# Patient Record
Sex: FEMALE | Race: WHITE | NOT HISPANIC OR LATINO | URBAN - METROPOLITAN AREA
[De-identification: names, ages, dates, MRNs, and addresses within clinical notes are randomized per-mention and may not be internally consistent; named-entity substitution may affect disease eponyms.]

---

## 2021-02-11 ENCOUNTER — APPOINTMENT (RX ONLY)
Dept: URBAN - METROPOLITAN AREA CLINIC 30 | Facility: CLINIC | Age: 86
Setting detail: DERMATOLOGY
End: 2021-02-11

## 2021-02-11 ENCOUNTER — APPOINTMENT (RX ONLY)
Dept: URBAN - METROPOLITAN AREA CLINIC 29 | Facility: CLINIC | Age: 86
Setting detail: DERMATOLOGY
End: 2021-02-11

## 2021-02-11 DIAGNOSIS — Z85.828 PERSONAL HISTORY OF OTHER MALIGNANT NEOPLASM OF SKIN: ICD-10-CM

## 2021-02-11 DIAGNOSIS — L82.1 OTHER SEBORRHEIC KERATOSIS: ICD-10-CM

## 2021-02-11 PROBLEM — D48.5 NEOPLASM OF UNCERTAIN BEHAVIOR OF SKIN: Status: ACTIVE | Noted: 2021-02-11

## 2021-02-11 PROCEDURE — ? ADDITIONAL NOTES

## 2021-02-11 PROCEDURE — 11102 TANGNTL BX SKIN SINGLE LES: CPT

## 2021-02-11 PROCEDURE — 99202 OFFICE O/P NEW SF 15 MIN: CPT | Mod: 25

## 2021-02-11 PROCEDURE — ? COUNSELING

## 2021-02-11 PROCEDURE — ? BIOPSY BY SHAVE METHOD

## 2021-02-11 ASSESSMENT — LOCATION DETAILED DESCRIPTION DERM
LOCATION DETAILED: RIGHT PROXIMAL DORSAL FOREARM
LOCATION DETAILED: LEFT PROXIMAL DORSAL FOREARM
LOCATION DETAILED: RIGHT PROXIMAL DORSAL FOREARM
LOCATION DETAILED: LEFT PROXIMAL DORSAL FOREARM

## 2021-02-11 ASSESSMENT — LOCATION SIMPLE DESCRIPTION DERM
LOCATION SIMPLE: RIGHT FOREARM
LOCATION SIMPLE: LEFT FOREARM
LOCATION SIMPLE: RIGHT FOREARM
LOCATION SIMPLE: LEFT FOREARM

## 2021-02-11 ASSESSMENT — LOCATION ZONE DERM
LOCATION ZONE: ARM
LOCATION ZONE: ARM

## 2021-02-11 NOTE — PROCEDURE: BIOPSY BY SHAVE METHOD
Hide Accession Number?: No
Hemostasis: Electrocautery
Consent: verbal consent was obtained and risks were reviewed including but not limited to scarring, infection, bleeding, scabbing, incomplete removal, nerve damage and allergy to anesthesia.
Anesthesia Type: 1% lidocaine with epinephrine
Curettage Text: The wound bed was treated with curettage after the biopsy was performed.
Dressing: no dressing applied
Type Of Destruction Used: Curettage
Depth Of Biopsy: dermis
Size Of Lesion In Cm: 0
Electrodesiccation And Curettage Text: The wound bed was treated with electrodesiccation and curettage after the biopsy was performed.
Silver Nitrate Text: The wound bed was treated with silver nitrate after the biopsy was performed.
Post-Care Instructions: I reviewed with the patient in detail post-care instructions. Patient is to keep the biopsy site dry overnight, and then apply vaseline daily until healed. Patient may use occusoft to clean the area if a crust forms
Information: Selecting Yes will display possible errors in your note based on the variables you have selected. This validation is only offered as a suggestion for you. PLEASE NOTE THAT THE VALIDATION TEXT WILL BE REMOVED WHEN YOU FINALIZE YOUR NOTE. IF YOU WANT TO FAX A PRELIMINARY NOTE YOU WILL NEED TO TOGGLE THIS TO 'NO' IF YOU DO NOT WANT IT IN YOUR FAXED NOTE.
Wound Care: Petrolatum
Anesthesia Volume In Cc: 0.3
Cryotherapy Text: The wound bed was treated with cryotherapy after the biopsy was performed.
Billing Type: Third-Party Bill
Notification Instructions: Patient will be notified of biopsy results. However, patient instructed to call the office if not contacted within 2 weeks.
Detail Level: Detailed
Electrodesiccation Text: The wound bed was treated with electrodesiccation after the biopsy was performed.
Was A Bandage Applied: Yes
Biopsy Type: H and E
Biopsy Method: Dermablade

## 2021-02-11 NOTE — PROCEDURE: ADDITIONAL NOTES
Render Risk Assessment In Note?: yes
Additional Notes: We discussed possibility that this could be a reactive or inflammatory process such as from retained suture material, however, I’m concerned about persistent or recurrent SCC. If biopsy shows cancer, then at this point Mohs surgery would be recommended.
Detail Level: Simple

## 2021-02-11 NOTE — PROCEDURE: BIOPSY BY SHAVE METHOD
Detail Level: Detailed
Depth Of Biopsy: dermis
Was A Bandage Applied: Yes
Size Of Lesion In Cm: 0
Biopsy Type: H and E
Biopsy Method: Dermablade
Anesthesia Type: 1% lidocaine with epinephrine
Anesthesia Volume In Cc: 0.3
Hemostasis: Electrocautery
Wound Care: Petrolatum
Dressing: no dressing applied
Destruction After The Procedure: No
Type Of Destruction Used: Curettage
Curettage Text: The wound bed was treated with curettage after the biopsy was performed.
Cryotherapy Text: The wound bed was treated with cryotherapy after the biopsy was performed.
Electrodesiccation Text: The wound bed was treated with electrodesiccation after the biopsy was performed.
Electrodesiccation And Curettage Text: The wound bed was treated with electrodesiccation and curettage after the biopsy was performed.
Silver Nitrate Text: The wound bed was treated with silver nitrate after the biopsy was performed.
Lab: [id number]
Lab Facility: [id number]
Consent: verbal consent was obtained and risks were reviewed including but not limited to scarring, infection, bleeding, scabbing, incomplete removal, nerve damage and allergy to anesthesia.
Post-Care Instructions: I reviewed with the patient in detail post-care instructions. Patient is to keep the biopsy site dry overnight, and then apply vaseline daily until healed. Patient may use occusoft to clean the area if a crust forms
Notification Instructions: Patient will be notified of biopsy results. However, patient instructed to call the office if not contacted within 2 weeks.
Billing Type: Third-Party Bill
Information: Selecting Yes will display possible errors in your note based on the variables you have selected. This validation is only offered as a suggestion for you. PLEASE NOTE THAT THE VALIDATION TEXT WILL BE REMOVED WHEN YOU FINALIZE YOUR NOTE. IF YOU WANT TO FAX A PRELIMINARY NOTE YOU WILL NEED TO TOGGLE THIS TO 'NO' IF YOU DO NOT WANT IT IN YOUR FAXED NOTE.

## 2021-02-11 NOTE — PROCEDURE: ADDITIONAL NOTES
Additional Notes: We discussed possibility that this could be a reactive or inflammatory process such as from retained suture material, however, I’m concerned about persistent or recurrent SCC. If biopsy shows cancer, then at this point Mohs surgery would be recommended.
Render Risk Assessment In Note?: yes
Detail Level: Simple

## 2021-02-24 ENCOUNTER — APPOINTMENT (RX ONLY)
Dept: URBAN - METROPOLITAN AREA CLINIC 30 | Facility: CLINIC | Age: 86
Setting detail: DERMATOLOGY
End: 2021-02-24

## 2021-02-24 ENCOUNTER — APPOINTMENT (RX ONLY)
Dept: URBAN - METROPOLITAN AREA CLINIC 29 | Facility: CLINIC | Age: 86
Setting detail: DERMATOLOGY
End: 2021-02-24

## 2021-02-24 PROBLEM — C44.722 SQUAMOUS CELL CARCINOMA OF SKIN OF RIGHT LOWER LIMB, INCLUDING HIP: Status: ACTIVE | Noted: 2021-02-24

## 2021-02-24 PROCEDURE — G6001 ECHO GUIDANCE RADIOTHERAPY: HCPCS

## 2021-02-24 PROCEDURE — ? PHE RELATED SUPPLIES PROVIDED/DISPENSED

## 2021-02-24 PROCEDURE — 77334 RADIATION TREATMENT AID(S): CPT

## 2021-02-24 PROCEDURE — 77280 THER RAD SIMULAJ FIELD SMPL: CPT

## 2021-02-24 PROCEDURE — 99213 OFFICE O/P EST LOW 20 MIN: CPT | Mod: 25

## 2021-02-24 PROCEDURE — 77300 RADIATION THERAPY DOSE PLAN: CPT

## 2021-02-24 PROCEDURE — 77261 THER RADIOLOGY TX PLNG SMPL: CPT

## 2021-02-24 PROCEDURE — ? SUPERFICIAL RADIATION TREATMENT

## 2021-02-24 PROCEDURE — ? TREATMENT REGIMEN

## 2021-02-24 NOTE — PROCEDURE: SUPERFICIAL RADIATION TREATMENT
Bill For Treatment Devices Only: No
Additional Prescription Justification Text: If there is any interruption in treatment exceeding 5 days please see Decay and Dose Adjustment Calculation and complete treatment under Prescription 2, 3 or 4 as appropriate.
Render Prescriptions In Note?: Yes
Port Dimensions-Y Axis In Cm: 3.5
Depth (Optional-Please Include Units): 2.07 mm
Energy (Optional-Please Include Units): 100 KV
Information: Selecting Yes will display possible errors in your note based on the variables you have selected. This validation is only offered as a suggestion for you. PLEASE NOTE THAT THE VALIDATION TEXT WILL BE REMOVED WHEN YOU FINALIZE YOUR NOTE. IF YOU WANT TO FAX A PRELIMINARY NOTE YOU WILL NEED TO TOGGLE THIS TO 'NO' IF YOU DO NOT WANT IT IN YOUR FAXED NOTE.
Daily Fractionated Dose (Optional- Include Units): 237.6 cGy
Functional Status: 0 (fully active)
Custom Shielding Preamble Text Will Not Be Included With Simple Simulations (.......... X X Y Cm): A lead shield of 0.762 mm thickness is utilized to form a molded, custom shield with a
Simple Simulation Preamble Text Will Be Included With Simple Simulations (.......... Indications): Simple simulation was performed today for the following reasons:
Total Number Of Fractions Rx 4: 15
Field Size (Applicator): 4.0 cm
Fractionation Number (Evaluation): 5
Treatment Device Design After Initial Simulation Justification (Will Render If Bill For Treatment Devices = Yes): The patient is status post radiation simulation and is evaluated as to the use of additional devices for shielding and placement for radiation therapy.
Field Number: 1
Dose Per Fractionation In Cgy (Optional): 237.6
Custom Shielding Afterword Text Will Not Be Included With Simple Simulations (X X Y Cm............): port to correlate with the lesion size, including treatment margin. The custom lead shield is adequate to accommodate the appropriate applicator and provide adequate shielding around the treatment site. Additional shielding (as noted below) is used to protect sensitive, normal tissues.
Treatment Time / Fractionation (Optional- Include Units): 0.36 min
Total Dose (Optional-Please Include Units): 1760
Detail Level: Zone
Fractionation Number: 0
Dimensions-X Axis In Cm: 1.9
Assessment: Appropriate reaction
Total Number Of Fractions: 25
Computed Treatment Time In Min (Will Render The Same As Calculated Treatment Time If Left Blank): 0.36
Treatment Margins In Cm: 0.5
Time Dose Fractionation (Optional- Include Units If Applicable): 98
Patient Positioning: Sitting
Initial Radiation Treatment Planning (Will Render If Bill Simulation = Yes): The patient had a complete consultation regarding all applicable modalities for the treatment of their skin cancer and based on a variety of factors including the type of tumor, size, and location, the relevant medical history as well as local tissue factors, the functional status of the individual, the ability to perform necessary postoperative wound instructions and the need for simultaneous treatments as well as overall wound healing status, it was determined that the patient would begin radiation therapy treatment for skin cancer.  A full simulation and treatment device design was performed including the determination and formulation of appropriate simple and complex devices including lead shield of 0.762 mm thickness to form molded customized shielding to specifically correlate with the lesion size including treatment margin.  The custom lead shield is adequate to accommodate the appropriate applicator and provide adequate shielding around the treatment site.  The specific field applicator, shields, and devices both simple and complex as well as the specific patient setup is outlined below.  The patient was given a full consent for superficial radiation to both verbally and in writing and the full determination of patient's eligibility for treatment and selection is outlined on the patient eligibility and treatment selection form.  The specific superficial radiotherapy prescription was determined and was documented on the superficial radiotherapy prescription form.  A treatment calculation was also performed and documented on the treatment calculation form.  Based on the prescription, the patient was scheduled for a series of fractional treatments.
Dimensions-Y Axis In Cm: 1.4
Shielding Size (Optional- Include Units): 3.5 cm x 3.5 cm
Fractions / Week: 3
Limb Positioning Or Elevation: elevated

## 2021-02-24 NOTE — PROCEDURE: SUPERFICIAL RADIATION TREATMENT
Detail Level: Zone
Field Number: 1
Dimensions-X Axis In Cm: 1.9
Dimensions-Y Axis In Cm: 1.4
Treatment Margins In Cm: 0.5
Render Additional Prescriptions In Note?: Yes
Joshua For Simulation Without Treatment Device Design (Simple Simulation): No
Functional Status: 0 (fully active)
Port Dimensions-X Axis In Cm: 3.5
Field Size (Applicator): 4.0 cm
Patient Positioning: Sitting
Limb Positioning Or Elevation: elevated
Custom Shielding Preamble Text Will Not Be Included With Simple Simulations (.......... X X Y Cm): A lead shield of 0.762 mm thickness is utilized to form a molded, custom shield with a
Custom Shielding Afterword Text Will Not Be Included With Simple Simulations (X X Y Cm............): port to correlate with the lesion size, including treatment margin. The custom lead shield is adequate to accommodate the appropriate applicator and provide adequate shielding around the treatment site. Additional shielding (as noted below) is used to protect sensitive, normal tissues.
Initial Radiation Treatment Planning (Will Render If Bill Simulation = Yes): The patient had a complete consultation regarding all applicable modalities for the treatment of their skin cancer and based on a variety of factors including the type of tumor, size, and location, the relevant medical history as well as local tissue factors, the functional status of the individual, the ability to perform necessary postoperative wound instructions and the need for simultaneous treatments as well as overall wound healing status, it was determined that the patient would begin radiation therapy treatment for skin cancer.  A full simulation and treatment device design was performed including the determination and formulation of appropriate simple and complex devices including lead shield of 0.762 mm thickness to form molded customized shielding to specifically correlate with the lesion size including treatment margin.  The custom lead shield is adequate to accommodate the appropriate applicator and provide adequate shielding around the treatment site.  The specific field applicator, shields, and devices both simple and complex as well as the specific patient setup is outlined below.  The patient was given a full consent for superficial radiation to both verbally and in writing and the full determination of patient's eligibility for treatment and selection is outlined on the patient eligibility and treatment selection form.  The specific superficial radiotherapy prescription was determined and was documented on the superficial radiotherapy prescription form.  A treatment calculation was also performed and documented on the treatment calculation form.  Based on the prescription, the patient was scheduled for a series of fractional treatments.
Simple Simulation Preamble Text Will Be Included With Simple Simulations (.......... Indications): Simple simulation was performed today for the following reasons:
Treatment Device Design After Initial Simulation Justification (Will Render If Bill For Treatment Devices = Yes): The patient is status post radiation simulation and is evaluated as to the use of additional devices for shielding and placement for radiation therapy.
Additional Prescription Justification Text: If there is any interruption in treatment exceeding 5 days please see Decay and Dose Adjustment Calculation and complete treatment under Prescription 2, 3 or 4 as appropriate.
Shielding Size (Optional- Include Units): 3.5 cm x 3.5 cm
Energy (Optional-Please Include Units): 100 KV
Depth (Optional-Please Include Units): 2.07 mm
Fractions / Week: 3
Time Dose Fractionation (Optional- Include Units If Applicable): 98
Daily Fractionated Dose (Optional- Include Units): 237.6 cGy
Treatment Time / Fractionation (Optional- Include Units): 0.36 min
Total Number Of Fractions: 25
Total Dose (Optional-Please Include Units): 6180
Fractions / Week Rx 2: 5
Total Number Of Fractions Rx 2: 15
Dose Per Fractionation In Cgy (Optional): 237.6
Computed Treatment Time In Min (Will Render The Same As Calculated Treatment Time If Left Blank): 0.36
Fractionation Number: 0
Assessment: Appropriate reaction
Information: Selecting Yes will display possible errors in your note based on the variables you have selected. This validation is only offered as a suggestion for you. PLEASE NOTE THAT THE VALIDATION TEXT WILL BE REMOVED WHEN YOU FINALIZE YOUR NOTE. IF YOU WANT TO FAX A PRELIMINARY NOTE YOU WILL NEED TO TOGGLE THIS TO 'NO' IF YOU DO NOT WANT IT IN YOUR FAXED NOTE.

## 2021-02-24 NOTE — PROCEDURE: TREATMENT REGIMEN
Plan: Per the request of Dr. Gao, patient was seen today for Superficial Radiation Therapy requiring simulation in preparation for treatment of specific diseased site(s). Simulation is necessary to determine correct patient and treatment portal positioning, deliver safe and effective radiation therapy. \\n\\nA high frequency ultrasound image was acquired today for three-dimensional evaluation of tumor volume and response to treatment, in addition to geometric accuracy of field placement (CPT® ). Physician evaluation of the ultrasound tumor depth will be ongoing through the course of treatment and is deemed medically necessary ensuring efficacy of treatment. Ultrasound was used to confirm treatment location and determine depth of treatment. \\n\\Lakesha appropriate custom blocking and treatment parameters were verified by the Radiation Therapist. Today’s visit is for Simulation and planning for radiation therapy.  Questions were answered and concerns were addressed.  Patient was evaluated based on listed criteria and is a suitable candidate to begin SRT.   \\n\\nUS Depth: 2.07 mm\\n
Detail Level: Zone

## 2021-02-24 NOTE — PROCEDURE: MIPS QUALITY
Additional Notes: Patient has not received flu vaccine due to personal reasons
Detail Level: Detailed
Quality 110: Preventive Care And Screening: Influenza Immunization: Influenza Immunization not Administered for Documented Reasons.

## 2021-02-24 NOTE — PROCEDURE: TREATMENT REGIMEN
Detail Level: Zone
Plan: Per the request of Dr. Mitchell, patient was seen today for Superficial Radiation Therapy requiring simulation in preparation for treatment of specific diseased site(s). Simulation is necessary to determine correct patient and treatment portal positioning, deliver safe and effective radiation therapy. \\n\\nA high frequency ultrasound image was acquired today for three-dimensional evaluation of tumor volume and response to treatment, in addition to geometric accuracy of field placement (CPT® ). Physician evaluation of the ultrasound tumor depth will be ongoing through the course of treatment and is deemed medically necessary ensuring efficacy of treatment. Ultrasound was used to confirm treatment location and determine depth of treatment. \\n\\Gurjit appropriate custom blocking and treatment parameters were verified by the Radiation Therapist. Today’s visit is for Simulation and planning for radiation therapy.  Questions were answered and concerns were addressed.  Patient was evaluated based on listed criteria and is a suitable candidate to begin SRT.   \\n\\nUS Depth: 2.07 mm\\n

## 2021-03-02 ENCOUNTER — APPOINTMENT (RX ONLY)
Dept: URBAN - METROPOLITAN AREA CLINIC 29 | Facility: CLINIC | Age: 86
Setting detail: DERMATOLOGY
End: 2021-03-02

## 2021-03-02 ENCOUNTER — APPOINTMENT (RX ONLY)
Dept: URBAN - METROPOLITAN AREA CLINIC 30 | Facility: CLINIC | Age: 86
Setting detail: DERMATOLOGY
End: 2021-03-02

## 2021-03-02 PROBLEM — C44.722 SQUAMOUS CELL CARCINOMA OF SKIN OF RIGHT LOWER LIMB, INCLUDING HIP: Status: ACTIVE | Noted: 2021-03-02

## 2021-03-02 PROCEDURE — ? SUPERFICIAL RADIATION TREATMENT

## 2021-03-02 PROCEDURE — ? TREATMENT REGIMEN

## 2021-03-02 PROCEDURE — 77401: CPT

## 2021-03-02 PROCEDURE — G6001 ECHO GUIDANCE RADIOTHERAPY: HCPCS

## 2021-03-02 PROCEDURE — ? PHE RELATED SUPPLIES PROVIDED/DISPENSED

## 2021-03-02 PROCEDURE — 77280 THER RAD SIMULAJ FIELD SMPL: CPT

## 2021-03-02 NOTE — PROCEDURE: SUPERFICIAL RADIATION TREATMENT
Fractions / Week Rx 2: 5
Time Dose Fractionation (Optional- Include Units If Applicable): 98
Simple Simulation Preamble Text Will Be Included With Simple Simulations (.......... Indications): Simple simulation was performed today for the following reasons:
Cumulative Dose In Cgy (Optional): 237.6
Treatment Margins In Cm: 0.5
Include Rx 2 When Rendering Additional Prescriptions: No
Fractionation Number: 1
Field Size (Applicator): 4.0 cm
Render Patient Eligibility And Selection In Note?: Yes
Fractions / Week: 3
Initial Radiation Treatment Planning (Will Render If Bill Simulation = Yes): The patient had a complete consultation regarding all applicable modalities for the treatment of their skin cancer and based on a variety of factors including the type of tumor, size, and location, the relevant medical history as well as local tissue factors, the functional status of the individual, the ability to perform necessary postoperative wound instructions and the need for simultaneous treatments as well as overall wound healing status, it was determined that the patient would begin radiation therapy treatment for skin cancer.  A full simulation and treatment device design was performed including the determination and formulation of appropriate simple and complex devices including lead shield of 0.762 mm thickness to form molded customized shielding to specifically correlate with the lesion size including treatment margin.  The custom lead shield is adequate to accommodate the appropriate applicator and provide adequate shielding around the treatment site.  The specific field applicator, shields, and devices both simple and complex as well as the specific patient setup is outlined below.  The patient was given a full consent for superficial radiation to both verbally and in writing and the full determination of patient's eligibility for treatment and selection is outlined on the patient eligibility and treatment selection form.  The specific superficial radiotherapy prescription was determined and was documented on the superficial radiotherapy prescription form.  A treatment calculation was also performed and documented on the treatment calculation form.  Based on the prescription, the patient was scheduled for a series of fractional treatments.
Total Dose (Optional-Please Include Units): 9966
Dimensions-Y Axis In Cm: 1.4
Shielding Size (Optional- Include Units): 3.5 cm x 3.5 cm
Total Number Of Fractions Rx 4: 15
Daily Fractionated Dose (Optional- Include Units): 237.6 cGy
Assessment: Appropriate reaction
Treatment Time In Min (Optional): 0.36
Patient Positioning: Sitting
Detail Level: Zone
Energy (Optional-Please Include Units): 100 KV
Port Dimensions-X Axis In Cm: 3.5
Custom Shielding Preamble Text Will Not Be Included With Simple Simulations (.......... X X Y Cm): A lead shield of 0.762 mm thickness is utilized to form a molded, custom shield with a
Functional Status: 0 (fully active)
Treatment Time / Fractionation (Optional- Include Units): 0.36 min
Treatment Device Design After Initial Simulation Justification (Will Render If Bill For Treatment Devices = Yes): The patient is status post radiation simulation and is evaluated as to the use of additional devices for shielding and placement for radiation therapy.
Custom Shielding Afterword Text Will Not Be Included With Simple Simulations (X X Y Cm............): port to correlate with the lesion size, including treatment margin. The custom lead shield is adequate to accommodate the appropriate applicator and provide adequate shielding around the treatment site. Additional shielding (as noted below) is used to protect sensitive, normal tissues.
Depth (Optional-Please Include Units): 2.07 mm
Total Number Of Fractions: 25
Information: Selecting Yes will display possible errors in your note based on the variables you have selected. This validation is only offered as a suggestion for you. PLEASE NOTE THAT THE VALIDATION TEXT WILL BE REMOVED WHEN YOU FINALIZE YOUR NOTE. IF YOU WANT TO FAX A PRELIMINARY NOTE YOU WILL NEED TO TOGGLE THIS TO 'NO' IF YOU DO NOT WANT IT IN YOUR FAXED NOTE.
Additional Prescription Justification Text: If there is any interruption in treatment exceeding 5 days please see Decay and Dose Adjustment Calculation and complete treatment under Prescription 2, 3 or 4 as appropriate.
Dimensions-X Axis In Cm: 1.9
Limb Positioning Or Elevation: elevated
Day Of The Week Treatment Administered: Tuesday

## 2021-03-02 NOTE — PROCEDURE: TREATMENT REGIMEN
Plan: This patient has been treated today with image guided superficial radiation therapy for non-melanoma\\nskin cancer. Written informed consent has been previously obtained from this patient for this treatment. This\\nconsent is documented in the patient’s chart. The patient gave verbal consent to continue treatment\\ntoday. The patient was treated with a specific radiation dose and setup as prescribed by the provider listed on\\nthis visit note. A Radiation Therapist performed administration of radiation under supervision of\\nprovider. The treatment parameters and cumulative dose are indicated above. Prior to administering the radiation, the patient underwent a verification therapeutic radiology simulation-aided field setting defining relevant normal and abnormal target anatomy and acquiring images with high frequency ultrasound in addition to data necessary developing optimal radiation treatment process for the patient. This process includes verification of the treatment port(s) and proper treatment positioning. All treatment ports were photographed within electronic medical record. The\\npatient’s customized lead blocking along with gross tumor volume and margin was confirmed. Considering superficial radiotherapy is clinical in setup, this requires physician and radiation therapist to clarify location interest being treated against initial images, pathology and patient anatomy. Care was taken ensuring fields treated were geometrically accurate and properly positioned using therapeutic radiology simulation-aided field setting verification per fraction. This\\nprocess is also utilized to determine if any prescription or setup changes are necessary. These steps are\\ntherefore medically necessary ensuring safe and effective administration of radiation. Ongoing therapeutic radiology simulation-aided field setting verification is ordered throughout course of therapy. A high frequency ultrasound image was acquired today for two-dimensional evaluation of the tumor volume and response to treatment, in addition to geometric accuracy of field placement. US depth Is 1.71 mm, which is 0.36 mm in difference from previous imaging. The field placement and ultrasound imaging, per fraction, is separate and distinct from the initial simulation, and is an important task in providing safe administration of superficial radiation therapy. Physician evaluation of\\nthe ultrasound tumor depth will be ongoing throughout the course of treatment and is deemed medically necessary in order to ensure the efficacy of treatment and any necessary changes. Today’s image was evaluated for determination of continuation of treatment with the current plan or with necessary changes as appropriate. According to provider review of verification therapeutic radiology simulation-aided field setting and imaging, no change is required. Additionally, the use of ultrasound visualization and targeted assessment allows the patient to be able to see their cancer(s) progress, encouraging patient to complete and maintain compliance through full course of radiotherapy. Per Dr. Gao, continued ultrasound guidance and therapeutic radiology simulation-aided field setting verification per fraction is required for field placement, measurement of tumor depth, progress and acute effect monitoring.\\n\\n
Detail Level: Zone

## 2021-03-02 NOTE — PROCEDURE: SUPERFICIAL RADIATION TREATMENT
Detail Level: Zone
Field Number: 1
Dimensions-X Axis In Cm: 1.9
Dimensions-Y Axis In Cm: 1.4
Treatment Margins In Cm: 0.5
Render Additional Prescriptions In Note?: No
Render Patient Eligibility And Selection In Note?: Yes
Functional Status: 0 (fully active)
Port Dimensions-X Axis In Cm: 3.5
Field Size (Applicator): 4.0 cm
Patient Positioning: Sitting
Limb Positioning Or Elevation: elevated
Custom Shielding Preamble Text Will Not Be Included With Simple Simulations (.......... X X Y Cm): A lead shield of 0.762 mm thickness is utilized to form a molded, custom shield with a
Custom Shielding Afterword Text Will Not Be Included With Simple Simulations (X X Y Cm............): port to correlate with the lesion size, including treatment margin. The custom lead shield is adequate to accommodate the appropriate applicator and provide adequate shielding around the treatment site. Additional shielding (as noted below) is used to protect sensitive, normal tissues.
Initial Radiation Treatment Planning (Will Render If Bill Simulation = Yes): The patient had a complete consultation regarding all applicable modalities for the treatment of their skin cancer and based on a variety of factors including the type of tumor, size, and location, the relevant medical history as well as local tissue factors, the functional status of the individual, the ability to perform necessary postoperative wound instructions and the need for simultaneous treatments as well as overall wound healing status, it was determined that the patient would begin radiation therapy treatment for skin cancer.  A full simulation and treatment device design was performed including the determination and formulation of appropriate simple and complex devices including lead shield of 0.762 mm thickness to form molded customized shielding to specifically correlate with the lesion size including treatment margin.  The custom lead shield is adequate to accommodate the appropriate applicator and provide adequate shielding around the treatment site.  The specific field applicator, shields, and devices both simple and complex as well as the specific patient setup is outlined below.  The patient was given a full consent for superficial radiation to both verbally and in writing and the full determination of patient's eligibility for treatment and selection is outlined on the patient eligibility and treatment selection form.  The specific superficial radiotherapy prescription was determined and was documented on the superficial radiotherapy prescription form.  A treatment calculation was also performed and documented on the treatment calculation form.  Based on the prescription, the patient was scheduled for a series of fractional treatments.
Simple Simulation Preamble Text Will Be Included With Simple Simulations (.......... Indications): Simple simulation was performed today for the following reasons:
Treatment Device Design After Initial Simulation Justification (Will Render If Bill For Treatment Devices = Yes): The patient is status post radiation simulation and is evaluated as to the use of additional devices for shielding and placement for radiation therapy.
Additional Prescription Justification Text: If there is any interruption in treatment exceeding 5 days please see Decay and Dose Adjustment Calculation and complete treatment under Prescription 2, 3 or 4 as appropriate.
Shielding Size (Optional- Include Units): 3.5 cm x 3.5 cm
Energy (Optional-Please Include Units): 100 KV
Depth (Optional-Please Include Units): 2.07 mm
Fractions / Week: 3
Time Dose Fractionation (Optional- Include Units If Applicable): 98
Daily Fractionated Dose (Optional- Include Units): 237.6 cGy
Treatment Time / Fractionation (Optional- Include Units): 0.36 min
Total Number Of Fractions: 25
Total Dose (Optional-Please Include Units): 6881
Fractions / Week Rx 2: 5
Total Number Of Fractions Rx 2: 15
Dose Per Fractionation In Cgy (Optional): 237.6
Computed Treatment Time In Min (Will Render The Same As Calculated Treatment Time If Left Blank): 0.36
Day Of The Week Treatment Administered: Tuesday
Assessment: Appropriate reaction
Information: Selecting Yes will display possible errors in your note based on the variables you have selected. This validation is only offered as a suggestion for you. PLEASE NOTE THAT THE VALIDATION TEXT WILL BE REMOVED WHEN YOU FINALIZE YOUR NOTE. IF YOU WANT TO FAX A PRELIMINARY NOTE YOU WILL NEED TO TOGGLE THIS TO 'NO' IF YOU DO NOT WANT IT IN YOUR FAXED NOTE.

## 2021-03-03 ENCOUNTER — APPOINTMENT (RX ONLY)
Dept: URBAN - METROPOLITAN AREA CLINIC 29 | Facility: CLINIC | Age: 86
Setting detail: DERMATOLOGY
End: 2021-03-03

## 2021-03-03 ENCOUNTER — APPOINTMENT (RX ONLY)
Dept: URBAN - METROPOLITAN AREA CLINIC 30 | Facility: CLINIC | Age: 86
Setting detail: DERMATOLOGY
End: 2021-03-03

## 2021-03-03 PROBLEM — C44.722 SQUAMOUS CELL CARCINOMA OF SKIN OF RIGHT LOWER LIMB, INCLUDING HIP: Status: ACTIVE | Noted: 2021-03-03

## 2021-03-03 PROCEDURE — ? TREATMENT REGIMEN

## 2021-03-03 PROCEDURE — 77280 THER RAD SIMULAJ FIELD SMPL: CPT

## 2021-03-03 PROCEDURE — ? PHE RELATED SUPPLIES PROVIDED/DISPENSED

## 2021-03-03 PROCEDURE — ? SUPERFICIAL RADIATION TREATMENT

## 2021-03-03 PROCEDURE — 77401: CPT

## 2021-03-03 PROCEDURE — G6001 ECHO GUIDANCE RADIOTHERAPY: HCPCS

## 2021-03-03 NOTE — PROCEDURE: SUPERFICIAL RADIATION TREATMENT
Detail Level: Zone
Field Number: 1
Dimensions-X Axis In Cm: 1.9
Dimensions-Y Axis In Cm: 1.4
Treatment Margins In Cm: 0.5
Render Additional Prescriptions In Note?: No
Render Patient Eligibility And Selection In Note?: Yes
Functional Status: 0 (fully active)
Port Dimensions-X Axis In Cm: 3.5
Field Size (Applicator): 4.0 cm
Patient Positioning: Sitting
Limb Positioning Or Elevation: elevated
Custom Shielding Preamble Text Will Not Be Included With Simple Simulations (.......... X X Y Cm): A lead shield of 0.762 mm thickness is utilized to form a molded, custom shield with a
Custom Shielding Afterword Text Will Not Be Included With Simple Simulations (X X Y Cm............): port to correlate with the lesion size, including treatment margin. The custom lead shield is adequate to accommodate the appropriate applicator and provide adequate shielding around the treatment site. Additional shielding (as noted below) is used to protect sensitive, normal tissues.
Initial Radiation Treatment Planning (Will Render If Bill Simulation = Yes): The patient had a complete consultation regarding all applicable modalities for the treatment of their skin cancer and based on a variety of factors including the type of tumor, size, and location, the relevant medical history as well as local tissue factors, the functional status of the individual, the ability to perform necessary postoperative wound instructions and the need for simultaneous treatments as well as overall wound healing status, it was determined that the patient would begin radiation therapy treatment for skin cancer.  A full simulation and treatment device design was performed including the determination and formulation of appropriate simple and complex devices including lead shield of 0.762 mm thickness to form molded customized shielding to specifically correlate with the lesion size including treatment margin.  The custom lead shield is adequate to accommodate the appropriate applicator and provide adequate shielding around the treatment site.  The specific field applicator, shields, and devices both simple and complex as well as the specific patient setup is outlined below.  The patient was given a full consent for superficial radiation to both verbally and in writing and the full determination of patient's eligibility for treatment and selection is outlined on the patient eligibility and treatment selection form.  The specific superficial radiotherapy prescription was determined and was documented on the superficial radiotherapy prescription form.  A treatment calculation was also performed and documented on the treatment calculation form.  Based on the prescription, the patient was scheduled for a series of fractional treatments.
Simple Simulation Preamble Text Will Be Included With Simple Simulations (.......... Indications): Simple simulation was performed today for the following reasons:
Treatment Device Design After Initial Simulation Justification (Will Render If Bill For Treatment Devices = Yes): The patient is status post radiation simulation and is evaluated as to the use of additional devices for shielding and placement for radiation therapy.
Additional Prescription Justification Text: If there is any interruption in treatment exceeding 5 days please see Decay and Dose Adjustment Calculation and complete treatment under Prescription 2, 3 or 4 as appropriate.
Shielding Size (Optional- Include Units): 3.5 cm x 3.5 cm
Energy (Optional-Please Include Units): 100 KV
Depth (Optional-Please Include Units): 2.07 mm
Fractions / Week: 3
Time Dose Fractionation (Optional- Include Units If Applicable): 98
Daily Fractionated Dose (Optional- Include Units): 237.6 cGy
Treatment Time / Fractionation (Optional- Include Units): 0.36 min
Total Number Of Fractions: 25
Total Dose (Optional-Please Include Units): 6850
Fractions / Week Rx 2: 5
Total Number Of Fractions Rx 2: 15
Dose Per Fractionation In Cgy (Optional): 237.6
Computed Treatment Time In Min (Will Render The Same As Calculated Treatment Time If Left Blank): 0.36
Day Of The Week Treatment Administered: Wednesday
Fractionation Number: 2
Cumulative Dose In Cgy (Optional): 475.2
Assessment: Appropriate reaction
Information: Selecting Yes will display possible errors in your note based on the variables you have selected. This validation is only offered as a suggestion for you. PLEASE NOTE THAT THE VALIDATION TEXT WILL BE REMOVED WHEN YOU FINALIZE YOUR NOTE. IF YOU WANT TO FAX A PRELIMINARY NOTE YOU WILL NEED TO TOGGLE THIS TO 'NO' IF YOU DO NOT WANT IT IN YOUR FAXED NOTE.

## 2021-03-03 NOTE — PROCEDURE: SUPERFICIAL RADIATION TREATMENT
Day Of The Week Treatment Administered: Wednesday
Dose Per Fractionation In Cgy (Optional): 237.6
Information: Selecting Yes will display possible errors in your note based on the variables you have selected. This validation is only offered as a suggestion for you. PLEASE NOTE THAT THE VALIDATION TEXT WILL BE REMOVED WHEN YOU FINALIZE YOUR NOTE. IF YOU WANT TO FAX A PRELIMINARY NOTE YOU WILL NEED TO TOGGLE THIS TO 'NO' IF YOU DO NOT WANT IT IN YOUR FAXED NOTE.
Fractionation Number (Evaluation): 5
Field Size (Applicator): 4.0 cm
Validate Note Data (See Information Below): Yes
Time Dose Fractionation (Optional- Include Units If Applicable): 98
Bill For Simulation And Treatment Device Design: No
Treatment Time / Fractionation (Optional- Include Units): 0.36 min
Treatment Device Design After Initial Simulation Justification (Will Render If Bill For Treatment Devices = Yes): The patient is status post radiation simulation and is evaluated as to the use of additional devices for shielding and placement for radiation therapy.
Total Number Of Fractions Rx 4: 15
Initial Radiation Treatment Planning (Will Render If Bill Simulation = Yes): The patient had a complete consultation regarding all applicable modalities for the treatment of their skin cancer and based on a variety of factors including the type of tumor, size, and location, the relevant medical history as well as local tissue factors, the functional status of the individual, the ability to perform necessary postoperative wound instructions and the need for simultaneous treatments as well as overall wound healing status, it was determined that the patient would begin radiation therapy treatment for skin cancer.  A full simulation and treatment device design was performed including the determination and formulation of appropriate simple and complex devices including lead shield of 0.762 mm thickness to form molded customized shielding to specifically correlate with the lesion size including treatment margin.  The custom lead shield is adequate to accommodate the appropriate applicator and provide adequate shielding around the treatment site.  The specific field applicator, shields, and devices both simple and complex as well as the specific patient setup is outlined below.  The patient was given a full consent for superficial radiation to both verbally and in writing and the full determination of patient's eligibility for treatment and selection is outlined on the patient eligibility and treatment selection form.  The specific superficial radiotherapy prescription was determined and was documented on the superficial radiotherapy prescription form.  A treatment calculation was also performed and documented on the treatment calculation form.  Based on the prescription, the patient was scheduled for a series of fractional treatments.
Field Number: 1
Patient Positioning: Sitting
Limb Positioning Or Elevation: elevated
Total Number Of Fractions: 25
Depth (Optional-Please Include Units): 2.07 mm
Port Dimensions-X Axis In Cm: 3.5
Simple Simulation Preamble Text Will Be Included With Simple Simulations (.......... Indications): Simple simulation was performed today for the following reasons:
Dimensions-X Axis In Cm: 1.9
Additional Prescription Justification Text: If there is any interruption in treatment exceeding 5 days please see Decay and Dose Adjustment Calculation and complete treatment under Prescription 2, 3 or 4 as appropriate.
Custom Shielding Preamble Text Will Not Be Included With Simple Simulations (.......... X X Y Cm): A lead shield of 0.762 mm thickness is utilized to form a molded, custom shield with a
Daily Fractionated Dose (Optional- Include Units): 237.6 cGy
Energy (Include Units): 100 KV
Cumulative Dose In Cgy (Optional): 475.2
Custom Shielding Afterword Text Will Not Be Included With Simple Simulations (X X Y Cm............): port to correlate with the lesion size, including treatment margin. The custom lead shield is adequate to accommodate the appropriate applicator and provide adequate shielding around the treatment site. Additional shielding (as noted below) is used to protect sensitive, normal tissues.
Shielding Size (Optional- Include Units): 3.5 cm x 3.5 cm
Dimensions-Y Axis In Cm: 1.4
Treatment Time In Min (Optional): 0.36
Fractionation Number: 2
Functional Status: 0 (fully active)
Total Dose (Optional-Please Include Units): 8502
Detail Level: Zone
Assessment: Appropriate reaction
Fractions / Week: 3
Treatment Margins In Cm: 0.5

## 2021-03-03 NOTE — PROCEDURE: TREATMENT REGIMEN
Plan: This patient has been treated today with image guided superficial radiation therapy for non-melanoma\\nskin cancer. Written informed consent has been previously obtained from this patient for this treatment. This\\nconsent is documented in the patient’s chart. The patient gave verbal consent to continue treatment\\ntoday. The patient was treated with a specific radiation dose and setup as prescribed by the provider listed on\\nthis visit note. A Radiation Therapist performed administration of radiation under supervision of\\nprovider. The treatment parameters and cumulative dose are indicated above. Prior to administering the radiation, the patient underwent a verification therapeutic radiology simulation-aided field setting defining relevant normal and abnormal target anatomy and acquiring images with high frequency ultrasound in addition to data necessary developing optimal radiation treatment process for the patient. This process includes verification of the treatment port(s) and proper treatment positioning. All treatment ports were photographed within electronic medical record. The\\npatient’s customized lead blocking along with gross tumor volume and margin was confirmed. Considering superficial radiotherapy is clinical in setup, this requires physician and radiation therapist to clarify location interest being treated against initial images, pathology and patient anatomy. Care was taken ensuring fields treated were geometrically accurate and properly positioned using therapeutic radiology simulation-aided field setting verification per fraction. This\\nprocess is also utilized to determine if any prescription or setup changes are necessary. These steps are\\ntherefore medically necessary ensuring safe and effective administration of radiation. Ongoing therapeutic radiology simulation-aided field setting verification is ordered throughout course of therapy. A high frequency ultrasound image was acquired today for two-dimensional evaluation of the tumor volume and response to treatment, in addition to geometric accuracy of field placement. US depth Is 1.65 mm, which is 0.06 mm in difference from previous imaging. The field placement and ultrasound imaging, per fraction, is separate and distinct from the initial simulation, and is an important task in providing safe administration of superficial radiation therapy. Physician evaluation of\\nthe ultrasound tumor depth will be ongoing throughout the course of treatment and is deemed medically necessary in order to ensure the efficacy of treatment and any necessary changes. Today’s image was evaluated for determination of continuation of treatment with the current plan or with necessary changes as appropriate. According to provider review of verification therapeutic radiology simulation-aided field setting and imaging, no change is required. Additionally, the use of ultrasound visualization and targeted assessment allows the patient to be able to see their cancer(s) progress, encouraging patient to complete and maintain compliance through full course of radiotherapy. Per Dr. Gao, continued ultrasound guidance and therapeutic radiology simulation-aided field setting verification per fraction is required for field placement, measurement of tumor depth, progress and acute effect monitoring.\\n\\n
Detail Level: Zone

## 2021-03-03 NOTE — PROCEDURE: TREATMENT REGIMEN
Plan: This patient has been treated today with image guided superficial radiation therapy for non-melanoma\\nskin cancer. Written informed consent has been previously obtained from this patient for this treatment. This\\nconsent is documented in the patient’s chart. The patient gave verbal consent to continue treatment\\ntoday. The patient was treated with a specific radiation dose and setup as prescribed by the provider listed on\\nthis visit note. A Radiation Therapist performed administration of radiation under supervision of\\nprovider. The treatment parameters and cumulative dose are indicated above. Prior to administering the radiation, the patient underwent a verification therapeutic radiology simulation-aided field setting defining relevant normal and abnormal target anatomy and acquiring images with high frequency ultrasound in addition to data necessary developing optimal radiation treatment process for the patient. This process includes verification of the treatment port(s) and proper treatment positioning. All treatment ports were photographed within electronic medical record. The\\npatient’s customized lead blocking along with gross tumor volume and margin was confirmed. Considering superficial radiotherapy is clinical in setup, this requires physician and radiation therapist to clarify location interest being treated against initial images, pathology and patient anatomy. Care was taken ensuring fields treated were geometrically accurate and properly positioned using therapeutic radiology simulation-aided field setting verification per fraction. This\\nprocess is also utilized to determine if any prescription or setup changes are necessary. These steps are\\ntherefore medically necessary ensuring safe and effective administration of radiation. Ongoing therapeutic radiology simulation-aided field setting verification is ordered throughout course of therapy. A high frequency ultrasound image was acquired today for two-dimensional evaluation of the tumor volume and response to treatment, in addition to geometric accuracy of field placement. US depth Is 1.65 mm, which is 0.06 mm in difference from previous imaging. The field placement and ultrasound imaging, per fraction, is separate and distinct from the initial simulation, and is an important task in providing safe administration of superficial radiation therapy. Physician evaluation of\\nthe ultrasound tumor depth will be ongoing throughout the course of treatment and is deemed medically necessary in order to ensure the efficacy of treatment and any necessary changes. Today’s image was evaluated for determination of continuation of treatment with the current plan or with necessary changes as appropriate. According to provider review of verification therapeutic radiology simulation-aided field setting and imaging, no change is required. Additionally, the use of ultrasound visualization and targeted assessment allows the patient to be able to see their cancer(s) progress, encouraging patient to complete and maintain compliance through full course of radiotherapy. Per Dr. Mitchell, continued ultrasound guidance and therapeutic radiology simulation-aided field setting verification per fraction is required for field placement, measurement of tumor depth, progress and acute effect monitoring.\\n\\n
Detail Level: Zone

## 2021-03-04 ENCOUNTER — APPOINTMENT (RX ONLY)
Dept: URBAN - METROPOLITAN AREA CLINIC 29 | Facility: CLINIC | Age: 86
Setting detail: DERMATOLOGY
End: 2021-03-04

## 2021-03-04 ENCOUNTER — APPOINTMENT (RX ONLY)
Dept: URBAN - METROPOLITAN AREA CLINIC 30 | Facility: CLINIC | Age: 86
Setting detail: DERMATOLOGY
End: 2021-03-04

## 2021-03-04 PROBLEM — C44.722 SQUAMOUS CELL CARCINOMA OF SKIN OF RIGHT LOWER LIMB, INCLUDING HIP: Status: ACTIVE | Noted: 2021-03-04

## 2021-03-04 PROCEDURE — ? FOLLOW UP FOR NEXT VISIT

## 2021-03-04 PROCEDURE — ? PHE RELATED SUPPLIES PROVIDED/DISPENSED

## 2021-03-04 PROCEDURE — 77280 THER RAD SIMULAJ FIELD SMPL: CPT

## 2021-03-04 PROCEDURE — G6001 ECHO GUIDANCE RADIOTHERAPY: HCPCS

## 2021-03-04 PROCEDURE — ? TREATMENT REGIMEN

## 2021-03-04 PROCEDURE — 77401: CPT

## 2021-03-04 PROCEDURE — ? SUPERFICIAL RADIATION TREATMENT

## 2021-03-04 NOTE — PROCEDURE: SUPERFICIAL RADIATION TREATMENT
Validate Note Data (See Information Below): Yes
Limb Positioning Or Elevation: elevated
Additional Prescription Justification Text: If there is any interruption in treatment exceeding 5 days please see Decay and Dose Adjustment Calculation and complete treatment under Prescription 2, 3 or 4 as appropriate.
Prescription Used: 1
Treatment Time In Min (Optional): 0.36
Fractions / Week Rx 3: 5
Daily Fractionated Dose (Optional- Include Units): 237.6 cGy
Include Rx 2 When Rendering Additional Prescriptions: No
Custom Shielding Afterword Text Will Not Be Included With Simple Simulations (X X Y Cm............): port to correlate with the lesion size, including treatment margin. The custom lead shield is adequate to accommodate the appropriate applicator and provide adequate shielding around the treatment site. Additional shielding (as noted below) is used to protect sensitive, normal tissues.
Treatment Device Design After Initial Simulation Justification (Will Render If Bill For Treatment Devices = Yes): The patient is status post radiation simulation and is evaluated as to the use of additional devices for shielding and placement for radiation therapy.
Depth (Optional-Please Include Units): 2.07 mm
Energy (Optional-Please Include Units): 100 KV
Port Dimensions-X Axis In Cm: 3.5
Total Number Of Fractions Rx 4: 15
Treatment Margins In Cm: 0.5
Treatment Time / Fractionation (Optional- Include Units): 0.36 min
Field Size (Applicator): 4.0 cm
Day Of The Week Treatment Administered: Thursday
Dose / Tx In Cgy (Optional): 237.6
Initial Radiation Treatment Planning (Will Render If Bill Simulation = Yes): The patient had a complete consultation regarding all applicable modalities for the treatment of their skin cancer and based on a variety of factors including the type of tumor, size, and location, the relevant medical history as well as local tissue factors, the functional status of the individual, the ability to perform necessary postoperative wound instructions and the need for simultaneous treatments as well as overall wound healing status, it was determined that the patient would begin radiation therapy treatment for skin cancer.  A full simulation and treatment device design was performed including the determination and formulation of appropriate simple and complex devices including lead shield of 0.762 mm thickness to form molded customized shielding to specifically correlate with the lesion size including treatment margin.  The custom lead shield is adequate to accommodate the appropriate applicator and provide adequate shielding around the treatment site.  The specific field applicator, shields, and devices both simple and complex as well as the specific patient setup is outlined below.  The patient was given a full consent for superficial radiation to both verbally and in writing and the full determination of patient's eligibility for treatment and selection is outlined on the patient eligibility and treatment selection form.  The specific superficial radiotherapy prescription was determined and was documented on the superficial radiotherapy prescription form.  A treatment calculation was also performed and documented on the treatment calculation form.  Based on the prescription, the patient was scheduled for a series of fractional treatments.
Information: Selecting Yes will display possible errors in your note based on the variables you have selected. This validation is only offered as a suggestion for you. PLEASE NOTE THAT THE VALIDATION TEXT WILL BE REMOVED WHEN YOU FINALIZE YOUR NOTE. IF YOU WANT TO FAX A PRELIMINARY NOTE YOU WILL NEED TO TOGGLE THIS TO 'NO' IF YOU DO NOT WANT IT IN YOUR FAXED NOTE.
Dimensions-Y Axis In Cm: 1.4
Detail Level: Zone
Patient Positioning: Sitting
Fractions / Week: 3
Total Number Of Fractions: 25
Simple Simulation Preamble Text Will Be Included With Simple Simulations (.......... Indications): Simple simulation was performed today for the following reasons:
Dimensions-X Axis In Cm: 1.9
Cumulative Dose In Cgy (Optional): 712.8
Shielding Size (Optional- Include Units): 3.5 cm x 3.5 cm
Time Dose Fractionation (Optional- Include Units If Applicable): 98
Total Dose (Optional-Please Include Units): 9938
Functional Status: 0 (fully active)
Custom Shielding Preamble Text Will Not Be Included With Simple Simulations (.......... X X Y Cm): A lead shield of 0.762 mm thickness is utilized to form a molded, custom shield with a
Assessment: Appropriate reaction

## 2021-03-04 NOTE — PROCEDURE: TREATMENT REGIMEN
Detail Level: Zone
Plan: This patient has been treated today with image guided superficial radiation therapy for non-melanoma\\nskin cancer. Written informed consent has been previously obtained from this patient for this treatment. This\\nconsent is documented in the patient’s chart. The patient gave verbal consent to continue treatment\\ntoday. The patient was treated with a specific radiation dose and setup as prescribed by the provider listed on\\nthis visit note. A Radiation Therapist performed administration of radiation under supervision of\\nprovider. The treatment parameters and cumulative dose are indicated above. Prior to administering the radiation, the patient underwent a verification therapeutic radiology simulation-aided field setting defining relevant normal and abnormal target anatomy and acquiring images with high frequency ultrasound in addition to data necessary developing optimal radiation treatment process for the patient. This process includes verification of the treatment port(s) and proper treatment positioning. All treatment ports were photographed within electronic medical record. The\\npatient’s customized lead blocking along with gross tumor volume and margin was confirmed. Considering superficial radiotherapy is clinical in setup, this requires physician and radiation therapist to clarify location interest being treated against initial images, pathology and patient anatomy. Care was taken ensuring fields treated were geometrically accurate and properly positioned using therapeutic radiology simulation-aided field setting verification per fraction. This\\nprocess is also utilized to determine if any prescription or setup changes are necessary. These steps are\\ntherefore medically necessary ensuring safe and effective administration of radiation. Ongoing therapeutic radiology simulation-aided field setting verification is ordered throughout course of therapy. A high frequency ultrasound image was acquired today for two-dimensional evaluation of the tumor volume and response to treatment, in addition to geometric accuracy of field placement. US depth Is 1.38 mm, which is 0.27 mm in difference from previous imaging. The field placement and ultrasound imaging, per fraction, is separate and distinct from the initial simulation, and is an important task in providing safe administration of superficial radiation therapy. Physician evaluation of\\nthe ultrasound tumor depth will be ongoing throughout the course of treatment and is deemed medically necessary in order to ensure the efficacy of treatment and any necessary changes. Today’s image was evaluated for determination of continuation of treatment with the current plan or with necessary changes as appropriate. According to provider review of verification therapeutic radiology simulation-aided field setting and imaging, no change is required. Additionally, the use of ultrasound visualization and targeted assessment allows the patient to be able to see their cancer(s) progress, encouraging patient to complete and maintain compliance through full course of radiotherapy. Per Dr. Mitchell, continued ultrasound guidance and therapeutic radiology simulation-aided field setting verification per fraction is required for field placement, measurement of tumor depth, progress and acute effect monitoring.\\n\\n

## 2021-03-04 NOTE — PROCEDURE: SUPERFICIAL RADIATION TREATMENT
Detail Level: Zone
Field Number: 1
Dimensions-X Axis In Cm: 1.9
Dimensions-Y Axis In Cm: 1.4
Treatment Margins In Cm: 0.5
Render Additional Prescriptions In Note?: No
Render Patient Eligibility And Selection In Note?: Yes
Functional Status: 0 (fully active)
Port Dimensions-X Axis In Cm: 3.5
Field Size (Applicator): 4.0 cm
Patient Positioning: Sitting
Limb Positioning Or Elevation: elevated
Custom Shielding Preamble Text Will Not Be Included With Simple Simulations (.......... X X Y Cm): A lead shield of 0.762 mm thickness is utilized to form a molded, custom shield with a
Custom Shielding Afterword Text Will Not Be Included With Simple Simulations (X X Y Cm............): port to correlate with the lesion size, including treatment margin. The custom lead shield is adequate to accommodate the appropriate applicator and provide adequate shielding around the treatment site. Additional shielding (as noted below) is used to protect sensitive, normal tissues.
Initial Radiation Treatment Planning (Will Render If Bill Simulation = Yes): The patient had a complete consultation regarding all applicable modalities for the treatment of their skin cancer and based on a variety of factors including the type of tumor, size, and location, the relevant medical history as well as local tissue factors, the functional status of the individual, the ability to perform necessary postoperative wound instructions and the need for simultaneous treatments as well as overall wound healing status, it was determined that the patient would begin radiation therapy treatment for skin cancer.  A full simulation and treatment device design was performed including the determination and formulation of appropriate simple and complex devices including lead shield of 0.762 mm thickness to form molded customized shielding to specifically correlate with the lesion size including treatment margin.  The custom lead shield is adequate to accommodate the appropriate applicator and provide adequate shielding around the treatment site.  The specific field applicator, shields, and devices both simple and complex as well as the specific patient setup is outlined below.  The patient was given a full consent for superficial radiation to both verbally and in writing and the full determination of patient's eligibility for treatment and selection is outlined on the patient eligibility and treatment selection form.  The specific superficial radiotherapy prescription was determined and was documented on the superficial radiotherapy prescription form.  A treatment calculation was also performed and documented on the treatment calculation form.  Based on the prescription, the patient was scheduled for a series of fractional treatments.
Simple Simulation Preamble Text Will Be Included With Simple Simulations (.......... Indications): Simple simulation was performed today for the following reasons:
Treatment Device Design After Initial Simulation Justification (Will Render If Bill For Treatment Devices = Yes): The patient is status post radiation simulation and is evaluated as to the use of additional devices for shielding and placement for radiation therapy.
Additional Prescription Justification Text: If there is any interruption in treatment exceeding 5 days please see Decay and Dose Adjustment Calculation and complete treatment under Prescription 2, 3 or 4 as appropriate.
Shielding Size (Optional- Include Units): 3.5 cm x 3.5 cm
Energy (Optional-Please Include Units): 100 KV
Depth (Optional-Please Include Units): 2.07 mm
Fractions / Week: 3
Time Dose Fractionation (Optional- Include Units If Applicable): 98
Daily Fractionated Dose (Optional- Include Units): 237.6 cGy
Treatment Time / Fractionation (Optional- Include Units): 0.36 min
Total Number Of Fractions: 25
Total Dose (Optional-Please Include Units): 0362
Fractions / Week Rx 2: 5
Total Number Of Fractions Rx 2: 15
Dose Per Fractionation In Cgy (Optional): 237.6
Computed Treatment Time In Min (Will Render The Same As Calculated Treatment Time If Left Blank): 0.36
Day Of The Week Treatment Administered: Thursday
Cumulative Dose In Cgy (Optional): 712.8
Assessment: Appropriate reaction
Information: Selecting Yes will display possible errors in your note based on the variables you have selected. This validation is only offered as a suggestion for you. PLEASE NOTE THAT THE VALIDATION TEXT WILL BE REMOVED WHEN YOU FINALIZE YOUR NOTE. IF YOU WANT TO FAX A PRELIMINARY NOTE YOU WILL NEED TO TOGGLE THIS TO 'NO' IF YOU DO NOT WANT IT IN YOUR FAXED NOTE.

## 2021-03-04 NOTE — PROCEDURE: TREATMENT REGIMEN
Plan: This patient has been treated today with image guided superficial radiation therapy for non-melanoma\\nskin cancer. Written informed consent has been previously obtained from this patient for this treatment. This\\nconsent is documented in the patient’s chart. The patient gave verbal consent to continue treatment\\ntoday. The patient was treated with a specific radiation dose and setup as prescribed by the provider listed on\\nthis visit note. A Radiation Therapist performed administration of radiation under supervision of\\nprovider. The treatment parameters and cumulative dose are indicated above. Prior to administering the radiation, the patient underwent a verification therapeutic radiology simulation-aided field setting defining relevant normal and abnormal target anatomy and acquiring images with high frequency ultrasound in addition to data necessary developing optimal radiation treatment process for the patient. This process includes verification of the treatment port(s) and proper treatment positioning. All treatment ports were photographed within electronic medical record. The\\npatient’s customized lead blocking along with gross tumor volume and margin was confirmed. Considering superficial radiotherapy is clinical in setup, this requires physician and radiation therapist to clarify location interest being treated against initial images, pathology and patient anatomy. Care was taken ensuring fields treated were geometrically accurate and properly positioned using therapeutic radiology simulation-aided field setting verification per fraction. This\\nprocess is also utilized to determine if any prescription or setup changes are necessary. These steps are\\ntherefore medically necessary ensuring safe and effective administration of radiation. Ongoing therapeutic radiology simulation-aided field setting verification is ordered throughout course of therapy. A high frequency ultrasound image was acquired today for two-dimensional evaluation of the tumor volume and response to treatment, in addition to geometric accuracy of field placement. US depth Is 1.38 mm, which is 0.27 mm in difference from previous imaging. The field placement and ultrasound imaging, per fraction, is separate and distinct from the initial simulation, and is an important task in providing safe administration of superficial radiation therapy. Physician evaluation of\\nthe ultrasound tumor depth will be ongoing throughout the course of treatment and is deemed medically necessary in order to ensure the efficacy of treatment and any necessary changes. Today’s image was evaluated for determination of continuation of treatment with the current plan or with necessary changes as appropriate. According to provider review of verification therapeutic radiology simulation-aided field setting and imaging, no change is required. Additionally, the use of ultrasound visualization and targeted assessment allows the patient to be able to see their cancer(s) progress, encouraging patient to complete and maintain compliance through full course of radiotherapy. Per Dr. Gao, continued ultrasound guidance and therapeutic radiology simulation-aided field setting verification per fraction is required for field placement, measurement of tumor depth, progress and acute effect monitoring.\\n\\n
Detail Level: Zone

## 2021-03-10 ENCOUNTER — APPOINTMENT (RX ONLY)
Dept: URBAN - METROPOLITAN AREA CLINIC 30 | Facility: CLINIC | Age: 86
Setting detail: DERMATOLOGY
End: 2021-03-10

## 2021-03-10 ENCOUNTER — APPOINTMENT (RX ONLY)
Dept: URBAN - METROPOLITAN AREA CLINIC 29 | Facility: CLINIC | Age: 86
Setting detail: DERMATOLOGY
End: 2021-03-10

## 2021-03-10 PROBLEM — C44.722 SQUAMOUS CELL CARCINOMA OF SKIN OF RIGHT LOWER LIMB, INCLUDING HIP: Status: ACTIVE | Noted: 2021-03-10

## 2021-03-10 PROCEDURE — ? TREATMENT REGIMEN

## 2021-03-10 PROCEDURE — ? FOLLOW UP FOR NEXT VISIT

## 2021-03-10 PROCEDURE — 77401: CPT

## 2021-03-10 PROCEDURE — 77280 THER RAD SIMULAJ FIELD SMPL: CPT

## 2021-03-10 PROCEDURE — ? SUPERFICIAL RADIATION TREATMENT

## 2021-03-10 PROCEDURE — G6001 ECHO GUIDANCE RADIOTHERAPY: HCPCS

## 2021-03-10 PROCEDURE — ? PHE RELATED SUPPLIES PROVIDED/DISPENSED

## 2021-03-10 NOTE — PROCEDURE: SUPERFICIAL RADIATION TREATMENT
Detail Level: Zone
Field Number: 1
Dimensions-X Axis In Cm: 1.9
Dimensions-Y Axis In Cm: 1.4
Treatment Margins In Cm: 0.5
Render Additional Prescriptions In Note?: No
Render Patient Eligibility And Selection In Note?: Yes
Functional Status: 0 (fully active)
Port Dimensions-X Axis In Cm: 3.5
Field Size (Applicator): 4.0 cm
Patient Positioning: Sitting
Limb Positioning Or Elevation: elevated
Custom Shielding Preamble Text Will Not Be Included With Simple Simulations (.......... X X Y Cm): A lead shield of 0.762 mm thickness is utilized to form a molded, custom shield with a
Custom Shielding Afterword Text Will Not Be Included With Simple Simulations (X X Y Cm............): port to correlate with the lesion size, including treatment margin. The custom lead shield is adequate to accommodate the appropriate applicator and provide adequate shielding around the treatment site. Additional shielding (as noted below) is used to protect sensitive, normal tissues.
Initial Radiation Treatment Planning (Will Render If Bill Simulation = Yes): The patient had a complete consultation regarding all applicable modalities for the treatment of their skin cancer and based on a variety of factors including the type of tumor, size, and location, the relevant medical history as well as local tissue factors, the functional status of the individual, the ability to perform necessary postoperative wound instructions and the need for simultaneous treatments as well as overall wound healing status, it was determined that the patient would begin radiation therapy treatment for skin cancer.  A full simulation and treatment device design was performed including the determination and formulation of appropriate simple and complex devices including lead shield of 0.762 mm thickness to form molded customized shielding to specifically correlate with the lesion size including treatment margin.  The custom lead shield is adequate to accommodate the appropriate applicator and provide adequate shielding around the treatment site.  The specific field applicator, shields, and devices both simple and complex as well as the specific patient setup is outlined below.  The patient was given a full consent for superficial radiation to both verbally and in writing and the full determination of patient's eligibility for treatment and selection is outlined on the patient eligibility and treatment selection form.  The specific superficial radiotherapy prescription was determined and was documented on the superficial radiotherapy prescription form.  A treatment calculation was also performed and documented on the treatment calculation form.  Based on the prescription, the patient was scheduled for a series of fractional treatments.
Simple Simulation Preamble Text Will Be Included With Simple Simulations (.......... Indications): Simple simulation was performed today for the following reasons:
Treatment Device Design After Initial Simulation Justification (Will Render If Bill For Treatment Devices = Yes): The patient is status post radiation simulation and is evaluated as to the use of additional devices for shielding and placement for radiation therapy.
Additional Prescription Justification Text: If there is any interruption in treatment exceeding 5 days please see Decay and Dose Adjustment Calculation and complete treatment under Prescription 2, 3 or 4 as appropriate.
Shielding Size (Optional- Include Units): 3.5 cm x 3.5 cm
Energy (Optional-Please Include Units): 100 KV
Depth (Optional-Please Include Units): 2.07 mm
Fractions / Week: 3
Time Dose Fractionation (Optional- Include Units If Applicable): 98
Daily Fractionated Dose (Optional- Include Units): 237.6 cGy
Treatment Time / Fractionation (Optional- Include Units): 0.36 min
Total Number Of Fractions: 25
Total Dose (Optional-Please Include Units): 5725
Fractions / Week Rx 2: 5
Total Number Of Fractions Rx 2: 15
Dose Per Fractionation In Cgy (Optional): 237.6
Computed Treatment Time In Min (Will Render The Same As Calculated Treatment Time If Left Blank): 0.36
Day Of The Week Treatment Administered: Wednesday
Fractionation Number: 4
Cumulative Dose In Cgy (Optional): 950.4
Assessment: Appropriate reaction
Information: Selecting Yes will display possible errors in your note based on the variables you have selected. This validation is only offered as a suggestion for you. PLEASE NOTE THAT THE VALIDATION TEXT WILL BE REMOVED WHEN YOU FINALIZE YOUR NOTE. IF YOU WANT TO FAX A PRELIMINARY NOTE YOU WILL NEED TO TOGGLE THIS TO 'NO' IF YOU DO NOT WANT IT IN YOUR FAXED NOTE.

## 2021-03-10 NOTE — PROCEDURE: TREATMENT REGIMEN
Plan: This patient has been treated today with image guided superficial radiation therapy for non-melanoma\\nskin cancer. Written informed consent has been previously obtained from this patient for this treatment. This\\nconsent is documented in the patient’s chart. The patient gave verbal consent to continue treatment\\ntoday. The patient was treated with a specific radiation dose and setup as prescribed by the provider listed on\\nthis visit note. A Radiation Therapist performed administration of radiation under supervision of\\nprovider. The treatment parameters and cumulative dose are indicated above. Prior to administering the radiation, the patient underwent a verification therapeutic radiology simulation-aided field setting defining relevant normal and abnormal target anatomy and acquiring images with high frequency ultrasound in addition to data necessary developing optimal radiation treatment process for the patient. This process includes verification of the treatment port(s) and proper treatment positioning. All treatment ports were photographed within electronic medical record. The\\npatient’s customized lead blocking along with gross tumor volume and margin was confirmed. Considering superficial radiotherapy is clinical in setup, this requires physician and radiation therapist to clarify location interest being treated against initial images, pathology and patient anatomy. Care was taken ensuring fields treated were geometrically accurate and properly positioned using therapeutic radiology simulation-aided field setting verification per fraction. This\\nprocess is also utilized to determine if any prescription or setup changes are necessary. These steps are\\ntherefore medically necessary ensuring safe and effective administration of radiation. Ongoing therapeutic radiology simulation-aided field setting verification is ordered throughout course of therapy. A high frequency ultrasound image was acquired today for two-dimensional evaluation of the tumor volume and response to treatment, in addition to geometric accuracy of field placement. US depth Is 1.24 mm, which is 0.14 mm in difference from previous imaging. The field placement and ultrasound imaging, per fraction, is separate and distinct from the initial simulation, and is an important task in providing safe administration of superficial radiation therapy. Physician evaluation of\\nthe ultrasound tumor depth will be ongoing throughout the course of treatment and is deemed medically necessary in order to ensure the efficacy of treatment and any necessary changes. Today’s image was evaluated for determination of continuation of treatment with the current plan or with necessary changes as appropriate. According to provider review of verification therapeutic radiology simulation-aided field setting and imaging, no change is required. Additionally, the use of ultrasound visualization and targeted assessment allows the patient to be able to see their cancer(s) progress, encouraging patient to complete and maintain compliance through full course of radiotherapy. Per Dr. Mitchell, continued ultrasound guidance and therapeutic radiology simulation-aided field setting verification per fraction is required for field placement, measurement of tumor depth, progress and acute effect monitoring.\\n\\n
Detail Level: Zone

## 2021-03-10 NOTE — PROCEDURE: TREATMENT REGIMEN
Plan: This patient has been treated today with image guided superficial radiation therapy for non-melanoma\\nskin cancer. Written informed consent has been previously obtained from this patient for this treatment. This\\nconsent is documented in the patient’s chart. The patient gave verbal consent to continue treatment\\ntoday. The patient was treated with a specific radiation dose and setup as prescribed by the provider listed on\\nthis visit note. A Radiation Therapist performed administration of radiation under supervision of\\nprovider. The treatment parameters and cumulative dose are indicated above. Prior to administering the radiation, the patient underwent a verification therapeutic radiology simulation-aided field setting defining relevant normal and abnormal target anatomy and acquiring images with high frequency ultrasound in addition to data necessary developing optimal radiation treatment process for the patient. This process includes verification of the treatment port(s) and proper treatment positioning. All treatment ports were photographed within electronic medical record. The\\npatient’s customized lead blocking along with gross tumor volume and margin was confirmed. Considering superficial radiotherapy is clinical in setup, this requires physician and radiation therapist to clarify location interest being treated against initial images, pathology and patient anatomy. Care was taken ensuring fields treated were geometrically accurate and properly positioned using therapeutic radiology simulation-aided field setting verification per fraction. This\\nprocess is also utilized to determine if any prescription or setup changes are necessary. These steps are\\ntherefore medically necessary ensuring safe and effective administration of radiation. Ongoing therapeutic radiology simulation-aided field setting verification is ordered throughout course of therapy. A high frequency ultrasound image was acquired today for two-dimensional evaluation of the tumor volume and response to treatment, in addition to geometric accuracy of field placement. US depth Is 1.24 mm, which is 0.14 mm in difference from previous imaging. The field placement and ultrasound imaging, per fraction, is separate and distinct from the initial simulation, and is an important task in providing safe administration of superficial radiation therapy. Physician evaluation of\\nthe ultrasound tumor depth will be ongoing throughout the course of treatment and is deemed medically necessary in order to ensure the efficacy of treatment and any necessary changes. Today’s image was evaluated for determination of continuation of treatment with the current plan or with necessary changes as appropriate. According to provider review of verification therapeutic radiology simulation-aided field setting and imaging, no change is required. Additionally, the use of ultrasound visualization and targeted assessment allows the patient to be able to see their cancer(s) progress, encouraging patient to complete and maintain compliance through full course of radiotherapy. Per Dr. Gao, continued ultrasound guidance and therapeutic radiology simulation-aided field setting verification per fraction is required for field placement, measurement of tumor depth, progress and acute effect monitoring.\\n\\n
Detail Level: Zone

## 2021-03-10 NOTE — PROCEDURE: SUPERFICIAL RADIATION TREATMENT
Fractionation Number: 4
Time Dose Fractionation (Optional- Include Units If Applicable): 98
Simple Simulation Preamble Text Will Be Included With Simple Simulations (.......... Indications): Simple simulation was performed today for the following reasons:
Limb Positioning Or Elevation: elevated
Include Rx 2 When Rendering Additional Prescriptions: No
Number Of Days Off Treatment: 1
Assessment: Appropriate reaction
Fractions / Week Rx 4: 5
Treatment Margins In Cm: 0.5
Port Dimensions-Y Axis In Cm: 3.5
Field Size (Applicator): 4.0 cm
Fractions / Week: 3
Initial Radiation Treatment Planning (Will Render If Bill Simulation = Yes): The patient had a complete consultation regarding all applicable modalities for the treatment of their skin cancer and based on a variety of factors including the type of tumor, size, and location, the relevant medical history as well as local tissue factors, the functional status of the individual, the ability to perform necessary postoperative wound instructions and the need for simultaneous treatments as well as overall wound healing status, it was determined that the patient would begin radiation therapy treatment for skin cancer.  A full simulation and treatment device design was performed including the determination and formulation of appropriate simple and complex devices including lead shield of 0.762 mm thickness to form molded customized shielding to specifically correlate with the lesion size including treatment margin.  The custom lead shield is adequate to accommodate the appropriate applicator and provide adequate shielding around the treatment site.  The specific field applicator, shields, and devices both simple and complex as well as the specific patient setup is outlined below.  The patient was given a full consent for superficial radiation to both verbally and in writing and the full determination of patient's eligibility for treatment and selection is outlined on the patient eligibility and treatment selection form.  The specific superficial radiotherapy prescription was determined and was documented on the superficial radiotherapy prescription form.  A treatment calculation was also performed and documented on the treatment calculation form.  Based on the prescription, the patient was scheduled for a series of fractional treatments.
Information: Selecting Yes will display possible errors in your note based on the variables you have selected. This validation is only offered as a suggestion for you. PLEASE NOTE THAT THE VALIDATION TEXT WILL BE REMOVED WHEN YOU FINALIZE YOUR NOTE. IF YOU WANT TO FAX A PRELIMINARY NOTE YOU WILL NEED TO TOGGLE THIS TO 'NO' IF YOU DO NOT WANT IT IN YOUR FAXED NOTE.
Total Dose (Optional-Please Include Units): 6402
Shielding Size (Optional- Include Units): 3.5 cm x 3.5 cm
Daily Fractionated Dose (Optional- Include Units): 237.6 cGy
Computed Treatment Time In Min (Will Render The Same As Calculated Treatment Time If Left Blank): 0.36
Detail Level: Zone
Energy (Optional-Please Include Units): 100 KV
Day Of The Week Treatment Administered: Wednesday
Total Number Of Fractions Rx 2: 15
Treatment Time / Fractionation (Optional- Include Units): 0.36 min
Treatment Device Design After Initial Simulation Justification (Will Render If Bill For Treatment Devices = Yes): The patient is status post radiation simulation and is evaluated as to the use of additional devices for shielding and placement for radiation therapy.
Custom Shielding Preamble Text Will Not Be Included With Simple Simulations (.......... X X Y Cm): A lead shield of 0.762 mm thickness is utilized to form a molded, custom shield with a
Validate Note Data (See Information Below): Yes
Dimensions-Y Axis In Cm: 1.4
Dose Per Fractionation In Cgy (Optional): 237.6
Cumulative Dose In Cgy (Optional): 950.4
Functional Status: 0 (fully active)
Custom Shielding Afterword Text Will Not Be Included With Simple Simulations (X X Y Cm............): port to correlate with the lesion size, including treatment margin. The custom lead shield is adequate to accommodate the appropriate applicator and provide adequate shielding around the treatment site. Additional shielding (as noted below) is used to protect sensitive, normal tissues.
Depth (Optional-Please Include Units): 2.07 mm
Total Number Of Fractions: 25
Dimensions-X Axis In Cm: 1.9
Additional Prescription Justification Text: If there is any interruption in treatment exceeding 5 days please see Decay and Dose Adjustment Calculation and complete treatment under Prescription 2, 3 or 4 as appropriate.
Patient Positioning: Sitting

## 2021-03-11 ENCOUNTER — APPOINTMENT (RX ONLY)
Dept: URBAN - METROPOLITAN AREA CLINIC 29 | Facility: CLINIC | Age: 86
Setting detail: DERMATOLOGY
End: 2021-03-11

## 2021-03-11 ENCOUNTER — APPOINTMENT (RX ONLY)
Dept: URBAN - METROPOLITAN AREA CLINIC 30 | Facility: CLINIC | Age: 86
Setting detail: DERMATOLOGY
End: 2021-03-11

## 2021-03-11 PROBLEM — C44.722 SQUAMOUS CELL CARCINOMA OF SKIN OF RIGHT LOWER LIMB, INCLUDING HIP: Status: ACTIVE | Noted: 2021-03-11

## 2021-03-11 PROCEDURE — G6001 ECHO GUIDANCE RADIOTHERAPY: HCPCS

## 2021-03-11 PROCEDURE — 77401: CPT

## 2021-03-11 PROCEDURE — 77280 THER RAD SIMULAJ FIELD SMPL: CPT

## 2021-03-11 PROCEDURE — ? PHE RELATED SUPPLIES PROVIDED/DISPENSED

## 2021-03-11 PROCEDURE — ? SUPERFICIAL RADIATION TREATMENT

## 2021-03-11 PROCEDURE — ? TREATMENT REGIMEN

## 2021-03-11 PROCEDURE — ? FOLLOW UP FOR NEXT VISIT

## 2021-03-11 NOTE — PROCEDURE: SUPERFICIAL RADIATION TREATMENT
Treatment Time / Fractionation (Optional- Include Units): 0.36 min
Field Size (Applicator): 4.0 cm
Include Rx 4 When Rendering Additional Prescriptions: No
Patient Positioning: Sitting
Port Dimensions-Y Axis In Cm: 3.5
Dose Per Fractionation In Cgy (Optional): 237.6
Bill For Dosimetry/Render Treatment Time Calculation In Note: Yes
Custom Shielding Afterword Text Will Not Be Included With Simple Simulations (X X Y Cm............): port to correlate with the lesion size, including treatment margin. The custom lead shield is adequate to accommodate the appropriate applicator and provide adequate shielding around the treatment site. Additional shielding (as noted below) is used to protect sensitive, normal tissues.
Treatment Device Design After Initial Simulation Justification (Will Render If Bill For Treatment Devices = Yes): The patient is status post radiation simulation and is evaluated as to the use of additional devices for shielding and placement for radiation therapy.
Treatment Margins In Cm: 0.5
Daily Fractionated Dose (Optional- Include Units): 237.6 cGy
Initial Radiation Treatment Planning (Will Render If Bill Simulation = Yes): The patient had a complete consultation regarding all applicable modalities for the treatment of their skin cancer and based on a variety of factors including the type of tumor, size, and location, the relevant medical history as well as local tissue factors, the functional status of the individual, the ability to perform necessary postoperative wound instructions and the need for simultaneous treatments as well as overall wound healing status, it was determined that the patient would begin radiation therapy treatment for skin cancer.  A full simulation and treatment device design was performed including the determination and formulation of appropriate simple and complex devices including lead shield of 0.762 mm thickness to form molded customized shielding to specifically correlate with the lesion size including treatment margin.  The custom lead shield is adequate to accommodate the appropriate applicator and provide adequate shielding around the treatment site.  The specific field applicator, shields, and devices both simple and complex as well as the specific patient setup is outlined below.  The patient was given a full consent for superficial radiation to both verbally and in writing and the full determination of patient's eligibility for treatment and selection is outlined on the patient eligibility and treatment selection form.  The specific superficial radiotherapy prescription was determined and was documented on the superficial radiotherapy prescription form.  A treatment calculation was also performed and documented on the treatment calculation form.  Based on the prescription, the patient was scheduled for a series of fractional treatments.
Field Number: 1
Energy (Include Units): 100 KV
Cumulative Dose In Cgy (Optional): 1188
Depth (Optional-Please Include Units): 2.07 mm
Fractions / Week Rx 4: 5
Assessment: Appropriate reaction
Total Dose (Optional-Please Include Units): 3805
Information: Selecting Yes will display possible errors in your note based on the variables you have selected. This validation is only offered as a suggestion for you. PLEASE NOTE THAT THE VALIDATION TEXT WILL BE REMOVED WHEN YOU FINALIZE YOUR NOTE. IF YOU WANT TO FAX A PRELIMINARY NOTE YOU WILL NEED TO TOGGLE THIS TO 'NO' IF YOU DO NOT WANT IT IN YOUR FAXED NOTE.
Detail Level: Zone
Simple Simulation Preamble Text Will Be Included With Simple Simulations (.......... Indications): Simple simulation was performed today for the following reasons:
Total Number Of Fractions Rx 4: 15
Dimensions-X Axis In Cm: 1.9
Computed Treatment Time In Min (Will Render The Same As Calculated Treatment Time If Left Blank): 0.36
Fractions / Week: 3
Total Number Of Fractions: 25
Additional Prescription Justification Text: If there is any interruption in treatment exceeding 5 days please see Decay and Dose Adjustment Calculation and complete treatment under Prescription 2, 3 or 4 as appropriate.
Functional Status: 0 (fully active)
Custom Shielding Preamble Text Will Not Be Included With Simple Simulations (.......... X X Y Cm): A lead shield of 0.762 mm thickness is utilized to form a molded, custom shield with a
Dimensions-Y Axis In Cm: 1.4
Time Dose Fractionation (Optional- Include Units If Applicable): 98
Day Of The Week Treatment Administered: Thursday
Shielding Size (Optional- Include Units): 3.5 cm x 3.5 cm
Limb Positioning Or Elevation: elevated

## 2021-03-11 NOTE — PROCEDURE: TREATMENT REGIMEN
Plan: This patient has been treated today with image guided superficial radiation therapy for non-melanoma\\nskin cancer. Written informed consent has been previously obtained from this patient for this treatment. This\\nconsent is documented in the patient’s chart. The patient gave verbal consent to continue treatment\\ntoday. The patient was treated with a specific radiation dose and setup as prescribed by the provider listed on\\nthis visit note. A Radiation Therapist performed administration of radiation under supervision of\\nprovider. The treatment parameters and cumulative dose are indicated above. Prior to administering the radiation, the patient underwent a verification therapeutic radiology simulation-aided field setting defining relevant normal and abnormal target anatomy and acquiring images with high frequency ultrasound in addition to data necessary developing optimal radiation treatment process for the patient. This process includes verification of the treatment port(s) and proper treatment positioning. All treatment ports were photographed within electronic medical record. The\\npatient’s customized lead blocking along with gross tumor volume and margin was confirmed. Considering superficial radiotherapy is clinical in setup, this requires physician and radiation therapist to clarify location interest being treated against initial images, pathology and patient anatomy. Care was taken ensuring fields treated were geometrically accurate and properly positioned using therapeutic radiology simulation-aided field setting verification per fraction. This\\nprocess is also utilized to determine if any prescription or setup changes are necessary. These steps are\\ntherefore medically necessary ensuring safe and effective administration of radiation. Ongoing therapeutic radiology simulation-aided field setting verification is ordered throughout course of therapy. A high frequency ultrasound image was acquired today for two-dimensional evaluation of the tumor volume and response to treatment, in addition to geometric accuracy of field placement. US depth Is 1.19 mm, which is 0.05 mm in difference from previous imaging. The field placement and ultrasound imaging, per fraction, is separate and distinct from the initial simulation, and is an important task in providing safe administration of superficial radiation therapy. Physician evaluation of\\nthe ultrasound tumor depth will be ongoing throughout the course of treatment and is deemed medically necessary in order to ensure the efficacy of treatment and any necessary changes. Today’s image was evaluated for determination of continuation of treatment with the current plan or with necessary changes as appropriate. According to provider review of verification therapeutic radiology simulation-aided field setting and imaging, no change is required. Additionally, the use of ultrasound visualization and targeted assessment allows the patient to be able to see their cancer(s) progress, encouraging patient to complete and maintain compliance through full course of radiotherapy. Per Dr. Gao, continued ultrasound guidance and therapeutic radiology simulation-aided field setting verification per fraction is required for field placement, measurement of tumor depth, progress and acute effect monitoring.\\n\\n
Detail Level: Zone

## 2021-03-11 NOTE — PROCEDURE: SUPERFICIAL RADIATION TREATMENT
Detail Level: Zone
Field Number: 1
Dimensions-X Axis In Cm: 1.9
Dimensions-Y Axis In Cm: 1.4
Treatment Margins In Cm: 0.5
Render Additional Prescriptions In Note?: No
Render Patient Eligibility And Selection In Note?: Yes
Functional Status: 0 (fully active)
Port Dimensions-X Axis In Cm: 3.5
Field Size (Applicator): 4.0 cm
Patient Positioning: Sitting
Limb Positioning Or Elevation: elevated
Custom Shielding Preamble Text Will Not Be Included With Simple Simulations (.......... X X Y Cm): A lead shield of 0.762 mm thickness is utilized to form a molded, custom shield with a
Custom Shielding Afterword Text Will Not Be Included With Simple Simulations (X X Y Cm............): port to correlate with the lesion size, including treatment margin. The custom lead shield is adequate to accommodate the appropriate applicator and provide adequate shielding around the treatment site. Additional shielding (as noted below) is used to protect sensitive, normal tissues.
Initial Radiation Treatment Planning (Will Render If Bill Simulation = Yes): The patient had a complete consultation regarding all applicable modalities for the treatment of their skin cancer and based on a variety of factors including the type of tumor, size, and location, the relevant medical history as well as local tissue factors, the functional status of the individual, the ability to perform necessary postoperative wound instructions and the need for simultaneous treatments as well as overall wound healing status, it was determined that the patient would begin radiation therapy treatment for skin cancer.  A full simulation and treatment device design was performed including the determination and formulation of appropriate simple and complex devices including lead shield of 0.762 mm thickness to form molded customized shielding to specifically correlate with the lesion size including treatment margin.  The custom lead shield is adequate to accommodate the appropriate applicator and provide adequate shielding around the treatment site.  The specific field applicator, shields, and devices both simple and complex as well as the specific patient setup is outlined below.  The patient was given a full consent for superficial radiation to both verbally and in writing and the full determination of patient's eligibility for treatment and selection is outlined on the patient eligibility and treatment selection form.  The specific superficial radiotherapy prescription was determined and was documented on the superficial radiotherapy prescription form.  A treatment calculation was also performed and documented on the treatment calculation form.  Based on the prescription, the patient was scheduled for a series of fractional treatments.
Simple Simulation Preamble Text Will Be Included With Simple Simulations (.......... Indications): Simple simulation was performed today for the following reasons:
Treatment Device Design After Initial Simulation Justification (Will Render If Bill For Treatment Devices = Yes): The patient is status post radiation simulation and is evaluated as to the use of additional devices for shielding and placement for radiation therapy.
Additional Prescription Justification Text: If there is any interruption in treatment exceeding 5 days please see Decay and Dose Adjustment Calculation and complete treatment under Prescription 2, 3 or 4 as appropriate.
Shielding Size (Optional- Include Units): 3.5 cm x 3.5 cm
Energy (Optional-Please Include Units): 100 KV
Depth (Optional-Please Include Units): 2.07 mm
Fractions / Week: 3
Time Dose Fractionation (Optional- Include Units If Applicable): 98
Daily Fractionated Dose (Optional- Include Units): 237.6 cGy
Treatment Time / Fractionation (Optional- Include Units): 0.36 min
Total Number Of Fractions: 25
Total Dose (Optional-Please Include Units): 0500
Fractions / Week Rx 2: 5
Total Number Of Fractions Rx 2: 15
Dose Per Fractionation In Cgy (Optional): 237.6
Computed Treatment Time In Min (Will Render The Same As Calculated Treatment Time If Left Blank): 0.36
Day Of The Week Treatment Administered: Thursday
Cumulative Dose In Cgy (Optional): 1188
Assessment: Appropriate reaction
Information: Selecting Yes will display possible errors in your note based on the variables you have selected. This validation is only offered as a suggestion for you. PLEASE NOTE THAT THE VALIDATION TEXT WILL BE REMOVED WHEN YOU FINALIZE YOUR NOTE. IF YOU WANT TO FAX A PRELIMINARY NOTE YOU WILL NEED TO TOGGLE THIS TO 'NO' IF YOU DO NOT WANT IT IN YOUR FAXED NOTE.

## 2021-03-11 NOTE — PROCEDURE: TREATMENT REGIMEN
Plan: This patient has been treated today with image guided superficial radiation therapy for non-melanoma\\nskin cancer. Written informed consent has been previously obtained from this patient for this treatment. This\\nconsent is documented in the patient’s chart. The patient gave verbal consent to continue treatment\\ntoday. The patient was treated with a specific radiation dose and setup as prescribed by the provider listed on\\nthis visit note. A Radiation Therapist performed administration of radiation under supervision of\\nprovider. The treatment parameters and cumulative dose are indicated above. Prior to administering the radiation, the patient underwent a verification therapeutic radiology simulation-aided field setting defining relevant normal and abnormal target anatomy and acquiring images with high frequency ultrasound in addition to data necessary developing optimal radiation treatment process for the patient. This process includes verification of the treatment port(s) and proper treatment positioning. All treatment ports were photographed within electronic medical record. The\\npatient’s customized lead blocking along with gross tumor volume and margin was confirmed. Considering superficial radiotherapy is clinical in setup, this requires physician and radiation therapist to clarify location interest being treated against initial images, pathology and patient anatomy. Care was taken ensuring fields treated were geometrically accurate and properly positioned using therapeutic radiology simulation-aided field setting verification per fraction. This\\nprocess is also utilized to determine if any prescription or setup changes are necessary. These steps are\\ntherefore medically necessary ensuring safe and effective administration of radiation. Ongoing therapeutic radiology simulation-aided field setting verification is ordered throughout course of therapy. A high frequency ultrasound image was acquired today for two-dimensional evaluation of the tumor volume and response to treatment, in addition to geometric accuracy of field placement. US depth Is 1.19 mm, which is 0.05 mm in difference from previous imaging. The field placement and ultrasound imaging, per fraction, is separate and distinct from the initial simulation, and is an important task in providing safe administration of superficial radiation therapy. Physician evaluation of\\nthe ultrasound tumor depth will be ongoing throughout the course of treatment and is deemed medically necessary in order to ensure the efficacy of treatment and any necessary changes. Today’s image was evaluated for determination of continuation of treatment with the current plan or with necessary changes as appropriate. According to provider review of verification therapeutic radiology simulation-aided field setting and imaging, no change is required. Additionally, the use of ultrasound visualization and targeted assessment allows the patient to be able to see their cancer(s) progress, encouraging patient to complete and maintain compliance through full course of radiotherapy. Per Dr. Mitchell, continued ultrasound guidance and therapeutic radiology simulation-aided field setting verification per fraction is required for field placement, measurement of tumor depth, progress and acute effect monitoring.\\n\\n
Detail Level: Zone

## 2021-03-12 ENCOUNTER — APPOINTMENT (RX ONLY)
Dept: URBAN - METROPOLITAN AREA CLINIC 29 | Facility: CLINIC | Age: 86
Setting detail: DERMATOLOGY
End: 2021-03-12

## 2021-03-12 ENCOUNTER — APPOINTMENT (RX ONLY)
Dept: URBAN - METROPOLITAN AREA CLINIC 30 | Facility: CLINIC | Age: 86
Setting detail: DERMATOLOGY
End: 2021-03-12

## 2021-03-12 PROBLEM — C44.722 SQUAMOUS CELL CARCINOMA OF SKIN OF RIGHT LOWER LIMB, INCLUDING HIP: Status: ACTIVE | Noted: 2021-03-12

## 2021-03-12 PROCEDURE — 77427 RADIATION TX MANAGEMENT X5: CPT

## 2021-03-12 PROCEDURE — ? SUPERFICIAL RADIATION TREATMENT

## 2021-03-12 PROCEDURE — 77280 THER RAD SIMULAJ FIELD SMPL: CPT

## 2021-03-12 PROCEDURE — G6001 ECHO GUIDANCE RADIOTHERAPY: HCPCS

## 2021-03-12 PROCEDURE — ? PHE RELATED SUPPLIES PROVIDED/DISPENSED

## 2021-03-12 PROCEDURE — 77401: CPT

## 2021-03-12 PROCEDURE — ? FOLLOW UP FOR NEXT VISIT

## 2021-03-12 PROCEDURE — ? TREATMENT REGIMEN

## 2021-03-12 NOTE — PROCEDURE: TREATMENT REGIMEN
Plan: This patient has been treated today with image guided superficial radiation therapy for non-melanoma\\nskin cancer. Written informed consent has been previously obtained from this patient for this treatment. This\\nconsent is documented in the patient’s chart. The patient gave verbal consent to continue treatment\\ntoday. The patient was treated with a specific radiation dose and setup as prescribed by the provider listed on\\nthis visit note. A Radiation Therapist performed administration of radiation under supervision of\\nprovider. The treatment parameters and cumulative dose are indicated above. Prior to administering the radiation, the patient underwent a verification therapeutic radiology simulation-aided field setting defining relevant normal and abnormal target anatomy and acquiring images with high frequency ultrasound in addition to data necessary developing optimal radiation treatment process for the patient. This process includes verification of the treatment port(s) and proper treatment positioning. All treatment ports were photographed within electronic medical record. The\\npatient’s customized lead blocking along with gross tumor volume and margin was confirmed. Considering superficial radiotherapy is clinical in setup, this requires physician and radiation therapist to clarify location interest being treated against initial images, pathology and patient anatomy. Care was taken ensuring fields treated were geometrically accurate and properly positioned using therapeutic radiology simulation-aided field setting verification per fraction. This\\nprocess is also utilized to determine if any prescription or setup changes are necessary. These steps are\\ntherefore medically necessary ensuring safe and effective administration of radiation. Ongoing therapeutic radiology simulation-aided field setting verification is ordered throughout course of therapy. A high frequency ultrasound image was acquired today for two-dimensional evaluation of the tumor volume and response to treatment, in addition to geometric accuracy of field placement. US depth Is 1.13 mm, which is 0.06 mm in difference from previous imaging. The field placement and ultrasound imaging, per fraction, is separate and distinct from the initial simulation, and is an important task in providing safe administration of superficial radiation therapy. Physician evaluation of\\nthe ultrasound tumor depth will be ongoing throughout the course of treatment and is deemed medically necessary in order to ensure the efficacy of treatment and any necessary changes. Today’s image was evaluated for determination of continuation of treatment with the current plan or with necessary changes as appropriate. According to provider review of verification therapeutic radiology simulation-aided field setting and imaging, no change is required. Additionally, the use of ultrasound visualization and targeted assessment allows the patient to be able to see their cancer(s) progress, encouraging patient to complete and maintain compliance through full course of radiotherapy. Per Dr. Gao, continued ultrasound guidance and therapeutic radiology simulation-aided field setting verification per fraction is required for field placement, measurement of tumor depth, progress and acute effect monitoring.\\n\\n
Detail Level: Zone

## 2021-03-12 NOTE — PROCEDURE: SUPERFICIAL RADIATION TREATMENT
Detail Level: Zone
Field Number: 1
Dimensions-X Axis In Cm: 1.9
Dimensions-Y Axis In Cm: 1.4
Treatment Margins In Cm: 0.5
Render Additional Prescriptions In Note?: No
Render Patient Eligibility And Selection In Note?: Yes
Functional Status: 0 (fully active)
Port Dimensions-X Axis In Cm: 3.5
Field Size (Applicator): 4.0 cm
Patient Positioning: Sitting
Limb Positioning Or Elevation: elevated
Custom Shielding Preamble Text Will Not Be Included With Simple Simulations (.......... X X Y Cm): A lead shield of 0.762 mm thickness is utilized to form a molded, custom shield with a
Custom Shielding Afterword Text Will Not Be Included With Simple Simulations (X X Y Cm............): port to correlate with the lesion size, including treatment margin. The custom lead shield is adequate to accommodate the appropriate applicator and provide adequate shielding around the treatment site. Additional shielding (as noted below) is used to protect sensitive, normal tissues.
Initial Radiation Treatment Planning (Will Render If Bill Simulation = Yes): The patient had a complete consultation regarding all applicable modalities for the treatment of their skin cancer and based on a variety of factors including the type of tumor, size, and location, the relevant medical history as well as local tissue factors, the functional status of the individual, the ability to perform necessary postoperative wound instructions and the need for simultaneous treatments as well as overall wound healing status, it was determined that the patient would begin radiation therapy treatment for skin cancer.  A full simulation and treatment device design was performed including the determination and formulation of appropriate simple and complex devices including lead shield of 0.762 mm thickness to form molded customized shielding to specifically correlate with the lesion size including treatment margin.  The custom lead shield is adequate to accommodate the appropriate applicator and provide adequate shielding around the treatment site.  The specific field applicator, shields, and devices both simple and complex as well as the specific patient setup is outlined below.  The patient was given a full consent for superficial radiation to both verbally and in writing and the full determination of patient's eligibility for treatment and selection is outlined on the patient eligibility and treatment selection form.  The specific superficial radiotherapy prescription was determined and was documented on the superficial radiotherapy prescription form.  A treatment calculation was also performed and documented on the treatment calculation form.  Based on the prescription, the patient was scheduled for a series of fractional treatments.
Simple Simulation Preamble Text Will Be Included With Simple Simulations (.......... Indications): Simple simulation was performed today for the following reasons:
Treatment Device Design After Initial Simulation Justification (Will Render If Bill For Treatment Devices = Yes): The patient is status post radiation simulation and is evaluated as to the use of additional devices for shielding and placement for radiation therapy.
Additional Prescription Justification Text: If there is any interruption in treatment exceeding 5 days please see Decay and Dose Adjustment Calculation and complete treatment under Prescription 2, 3 or 4 as appropriate.
Shielding Size (Optional- Include Units): 3.5 cm x 3.5 cm
Energy (Optional-Please Include Units): 100 KV
Depth (Optional-Please Include Units): 2.07 mm
Fractions / Week: 3
Time Dose Fractionation (Optional- Include Units If Applicable): 98
Daily Fractionated Dose (Optional- Include Units): 237.6 cGy
Treatment Time / Fractionation (Optional- Include Units): 0.36 min
Total Number Of Fractions: 25
Total Dose (Optional-Please Include Units): 6438
Fractions / Week Rx 2: 5
Total Number Of Fractions Rx 2: 15
Dose Per Fractionation In Cgy (Optional): 237.6
Computed Treatment Time In Min (Will Render The Same As Calculated Treatment Time If Left Blank): 0.36
Day Of The Week Treatment Administered: Friday
Fractionation Number: 6
Cumulative Dose In Cgy (Optional): 1425.6
Assessment: Appropriate reaction
Comments: RTOG 0
Information: Selecting Yes will display possible errors in your note based on the variables you have selected. This validation is only offered as a suggestion for you. PLEASE NOTE THAT THE VALIDATION TEXT WILL BE REMOVED WHEN YOU FINALIZE YOUR NOTE. IF YOU WANT TO FAX A PRELIMINARY NOTE YOU WILL NEED TO TOGGLE THIS TO 'NO' IF YOU DO NOT WANT IT IN YOUR FAXED NOTE.

## 2021-03-12 NOTE — PROCEDURE: SUPERFICIAL RADIATION TREATMENT
Bill For Radiation Treatment: Yes
Assessment: Appropriate reaction
Energy (Include Units): 100 KV
Render Text From Evaluation And Management Tab (Will Not Bill 43073): No
Field Number: 1
Shielding Size (Optional- Include Units): 3.5 cm x 3.5 cm
Port Dimensions-X Axis In Cm: 3.5
Daily Fractionated Dose (Optional- Include Units): 237.6 cGy
Day Of The Week Treatment Administered: Friday
Functional Status: 0 (fully active)
Dose Per Fractionation In Cgy (Optional): 237.6
Detail Level: Zone
Fractions / Week Rx 2: 5
Additional Prescription Justification Text: If there is any interruption in treatment exceeding 5 days please see Decay and Dose Adjustment Calculation and complete treatment under Prescription 2, 3 or 4 as appropriate.
Limb Positioning Or Elevation: elevated
Total Number Of Fractions Rx 3: 15
Custom Shielding Preamble Text Will Not Be Included With Simple Simulations (.......... X X Y Cm): A lead shield of 0.762 mm thickness is utilized to form a molded, custom shield with a
Dimensions-X Axis In Cm: 1.9
Treatment Time / Fractionation (Optional- Include Units): 0.36 min
Treatment Device Design After Initial Simulation Justification (Will Render If Bill For Treatment Devices = Yes): The patient is status post radiation simulation and is evaluated as to the use of additional devices for shielding and placement for radiation therapy.
Information: Selecting Yes will display possible errors in your note based on the variables you have selected. This validation is only offered as a suggestion for you. PLEASE NOTE THAT THE VALIDATION TEXT WILL BE REMOVED WHEN YOU FINALIZE YOUR NOTE. IF YOU WANT TO FAX A PRELIMINARY NOTE YOU WILL NEED TO TOGGLE THIS TO 'NO' IF YOU DO NOT WANT IT IN YOUR FAXED NOTE.
Fractions / Week: 3
Initial Radiation Treatment Planning (Will Render If Bill Simulation = Yes): The patient had a complete consultation regarding all applicable modalities for the treatment of their skin cancer and based on a variety of factors including the type of tumor, size, and location, the relevant medical history as well as local tissue factors, the functional status of the individual, the ability to perform necessary postoperative wound instructions and the need for simultaneous treatments as well as overall wound healing status, it was determined that the patient would begin radiation therapy treatment for skin cancer.  A full simulation and treatment device design was performed including the determination and formulation of appropriate simple and complex devices including lead shield of 0.762 mm thickness to form molded customized shielding to specifically correlate with the lesion size including treatment margin.  The custom lead shield is adequate to accommodate the appropriate applicator and provide adequate shielding around the treatment site.  The specific field applicator, shields, and devices both simple and complex as well as the specific patient setup is outlined below.  The patient was given a full consent for superficial radiation to both verbally and in writing and the full determination of patient's eligibility for treatment and selection is outlined on the patient eligibility and treatment selection form.  The specific superficial radiotherapy prescription was determined and was documented on the superficial radiotherapy prescription form.  A treatment calculation was also performed and documented on the treatment calculation form.  Based on the prescription, the patient was scheduled for a series of fractional treatments.
Treatment Margins In Cm: 0.5
Field Size (Applicator): 4.0 cm
Treatment Time In Min (Optional): 0.36
Total Number Of Fractions: 25
Depth (Optional-Please Include Units): 2.07 mm
Comments: RTOG 0
Custom Shielding Afterword Text Will Not Be Included With Simple Simulations (X X Y Cm............): port to correlate with the lesion size, including treatment margin. The custom lead shield is adequate to accommodate the appropriate applicator and provide adequate shielding around the treatment site. Additional shielding (as noted below) is used to protect sensitive, normal tissues.
Fractionation Number: 6
Dimensions-Y Axis In Cm: 1.4
Patient Positioning: Sitting
Cumulative Dose In Cgy (Optional): 1425.6
Simple Simulation Preamble Text Will Be Included With Simple Simulations (.......... Indications): Simple simulation was performed today for the following reasons:
Time Dose Fractionation (Optional- Include Units If Applicable): 98
Total Dose (Optional-Please Include Units): 0299

## 2021-03-12 NOTE — PROCEDURE: TREATMENT REGIMEN
Detail Level: Zone
Plan: This patient has been treated today with image guided superficial radiation therapy for non-melanoma\\nskin cancer. Written informed consent has been previously obtained from this patient for this treatment. This\\nconsent is documented in the patient’s chart. The patient gave verbal consent to continue treatment\\ntoday. The patient was treated with a specific radiation dose and setup as prescribed by the provider listed on\\nthis visit note. A Radiation Therapist performed administration of radiation under supervision of\\nprovider. The treatment parameters and cumulative dose are indicated above. Prior to administering the radiation, the patient underwent a verification therapeutic radiology simulation-aided field setting defining relevant normal and abnormal target anatomy and acquiring images with high frequency ultrasound in addition to data necessary developing optimal radiation treatment process for the patient. This process includes verification of the treatment port(s) and proper treatment positioning. All treatment ports were photographed within electronic medical record. The\\npatient’s customized lead blocking along with gross tumor volume and margin was confirmed. Considering superficial radiotherapy is clinical in setup, this requires physician and radiation therapist to clarify location interest being treated against initial images, pathology and patient anatomy. Care was taken ensuring fields treated were geometrically accurate and properly positioned using therapeutic radiology simulation-aided field setting verification per fraction. This\\nprocess is also utilized to determine if any prescription or setup changes are necessary. These steps are\\ntherefore medically necessary ensuring safe and effective administration of radiation. Ongoing therapeutic radiology simulation-aided field setting verification is ordered throughout course of therapy. A high frequency ultrasound image was acquired today for two-dimensional evaluation of the tumor volume and response to treatment, in addition to geometric accuracy of field placement. US depth Is 1.13 mm, which is 0.06 mm in difference from previous imaging. The field placement and ultrasound imaging, per fraction, is separate and distinct from the initial simulation, and is an important task in providing safe administration of superficial radiation therapy. Physician evaluation of\\nthe ultrasound tumor depth will be ongoing throughout the course of treatment and is deemed medically necessary in order to ensure the efficacy of treatment and any necessary changes. Today’s image was evaluated for determination of continuation of treatment with the current plan or with necessary changes as appropriate. According to provider review of verification therapeutic radiology simulation-aided field setting and imaging, no change is required. Additionally, the use of ultrasound visualization and targeted assessment allows the patient to be able to see their cancer(s) progress, encouraging patient to complete and maintain compliance through full course of radiotherapy. Per Dr. Mitchell, continued ultrasound guidance and therapeutic radiology simulation-aided field setting verification per fraction is required for field placement, measurement of tumor depth, progress and acute effect monitoring.\\n\\n

## 2021-03-17 ENCOUNTER — APPOINTMENT (RX ONLY)
Dept: URBAN - METROPOLITAN AREA CLINIC 29 | Facility: CLINIC | Age: 86
Setting detail: DERMATOLOGY
End: 2021-03-17

## 2021-03-17 ENCOUNTER — APPOINTMENT (RX ONLY)
Dept: URBAN - METROPOLITAN AREA CLINIC 30 | Facility: CLINIC | Age: 86
Setting detail: DERMATOLOGY
End: 2021-03-17

## 2021-03-17 PROBLEM — C44.722 SQUAMOUS CELL CARCINOMA OF SKIN OF RIGHT LOWER LIMB, INCLUDING HIP: Status: ACTIVE | Noted: 2021-03-17

## 2021-03-17 PROCEDURE — 77401: CPT

## 2021-03-17 PROCEDURE — ? PHE RELATED SUPPLIES PROVIDED/DISPENSED

## 2021-03-17 PROCEDURE — 77280 THER RAD SIMULAJ FIELD SMPL: CPT

## 2021-03-17 PROCEDURE — G6001 ECHO GUIDANCE RADIOTHERAPY: HCPCS

## 2021-03-17 PROCEDURE — ? FOLLOW UP FOR NEXT VISIT

## 2021-03-17 PROCEDURE — ? TREATMENT REGIMEN

## 2021-03-17 PROCEDURE — ? SUPERFICIAL RADIATION TREATMENT

## 2021-03-17 NOTE — PROCEDURE: TREATMENT REGIMEN
Detail Level: Zone
Plan: This patient has been treated today with image guided superficial radiation therapy for non-melanoma\\nskin cancer. Written informed consent has been previously obtained from this patient for this treatment. This\\nconsent is documented in the patient’s chart. The patient gave verbal consent to continue treatment\\ntoday. The patient was treated with a specific radiation dose and setup as prescribed by the provider listed on\\nthis visit note. A Radiation Therapist performed administration of radiation under supervision of\\nprovider. The treatment parameters and cumulative dose are indicated above. Prior to administering the radiation, the patient underwent a verification therapeutic radiology simulation-aided field setting defining relevant normal and abnormal target anatomy and acquiring images with high frequency ultrasound in addition to data necessary developing optimal radiation treatment process for the patient. This process includes verification of the treatment port(s) and proper treatment positioning. All treatment ports were photographed within electronic medical record. The\\npatient’s customized lead blocking along with gross tumor volume and margin was confirmed. Considering superficial radiotherapy is clinical in setup, this requires physician and radiation therapist to clarify location interest being treated against initial images, pathology and patient anatomy. Care was taken ensuring fields treated were geometrically accurate and properly positioned using therapeutic radiology simulation-aided field setting verification per fraction. This\\nprocess is also utilized to determine if any prescription or setup changes are necessary. These steps are\\ntherefore medically necessary ensuring safe and effective administration of radiation. Ongoing therapeutic radiology simulation-aided field setting verification is ordered throughout course of therapy. A high frequency ultrasound image was acquired today for two-dimensional evaluation of the tumor volume and response to treatment, in addition to geometric accuracy of field placement. US depth Is 1.08 mm, which is 0.05 mm in difference from previous imaging. The field placement and ultrasound imaging, per fraction, is separate and distinct from the initial simulation, and is an important task in providing safe administration of superficial radiation therapy. Physician evaluation of\\nthe ultrasound tumor depth will be ongoing throughout the course of treatment and is deemed medically necessary in order to ensure the efficacy of treatment and any necessary changes. Today’s image was evaluated for determination of continuation of treatment with the current plan or with necessary changes as appropriate. According to provider review of verification therapeutic radiology simulation-aided field setting and imaging, no change is required. Additionally, the use of ultrasound visualization and targeted assessment allows the patient to be able to see their cancer(s) progress, encouraging patient to complete and maintain compliance through full course of radiotherapy. Per Dr. Mitchell, continued ultrasound guidance and therapeutic radiology simulation-aided field setting verification per fraction is required for field placement, measurement of tumor depth, progress and acute effect monitoring.\\n\\n

## 2021-03-17 NOTE — PROCEDURE: SUPERFICIAL RADIATION TREATMENT
Detail Level: Zone
Field Number: 1
Dimensions-X Axis In Cm: 1.9
Dimensions-Y Axis In Cm: 1.4
Treatment Margins In Cm: 0.5
Render Additional Prescriptions In Note?: No
Render Patient Eligibility And Selection In Note?: Yes
Functional Status: 0 (fully active)
Port Dimensions-X Axis In Cm: 3.5
Field Size (Applicator): 4.0 cm
Patient Positioning: Sitting
Limb Positioning Or Elevation: elevated
Custom Shielding Preamble Text Will Not Be Included With Simple Simulations (.......... X X Y Cm): A lead shield of 0.762 mm thickness is utilized to form a molded, custom shield with a
Custom Shielding Afterword Text Will Not Be Included With Simple Simulations (X X Y Cm............): port to correlate with the lesion size, including treatment margin. The custom lead shield is adequate to accommodate the appropriate applicator and provide adequate shielding around the treatment site. Additional shielding (as noted below) is used to protect sensitive, normal tissues.
Initial Radiation Treatment Planning (Will Render If Bill Simulation = Yes): The patient had a complete consultation regarding all applicable modalities for the treatment of their skin cancer and based on a variety of factors including the type of tumor, size, and location, the relevant medical history as well as local tissue factors, the functional status of the individual, the ability to perform necessary postoperative wound instructions and the need for simultaneous treatments as well as overall wound healing status, it was determined that the patient would begin radiation therapy treatment for skin cancer.  A full simulation and treatment device design was performed including the determination and formulation of appropriate simple and complex devices including lead shield of 0.762 mm thickness to form molded customized shielding to specifically correlate with the lesion size including treatment margin.  The custom lead shield is adequate to accommodate the appropriate applicator and provide adequate shielding around the treatment site.  The specific field applicator, shields, and devices both simple and complex as well as the specific patient setup is outlined below.  The patient was given a full consent for superficial radiation to both verbally and in writing and the full determination of patient's eligibility for treatment and selection is outlined on the patient eligibility and treatment selection form.  The specific superficial radiotherapy prescription was determined and was documented on the superficial radiotherapy prescription form.  A treatment calculation was also performed and documented on the treatment calculation form.  Based on the prescription, the patient was scheduled for a series of fractional treatments.
Simple Simulation Preamble Text Will Be Included With Simple Simulations (.......... Indications): Simple simulation was performed today for the following reasons:
Treatment Device Design After Initial Simulation Justification (Will Render If Bill For Treatment Devices = Yes): The patient is status post radiation simulation and is evaluated as to the use of additional devices for shielding and placement for radiation therapy.
Additional Prescription Justification Text: If there is any interruption in treatment exceeding 5 days please see Decay and Dose Adjustment Calculation and complete treatment under Prescription 2, 3 or 4 as appropriate.
Shielding Size (Optional- Include Units): 3.5 cm x 3.5 cm
Energy (Optional-Please Include Units): 100 KV
Depth (Optional-Please Include Units): 2.07 mm
Fractions / Week: 3
Time Dose Fractionation (Optional- Include Units If Applicable): 98
Daily Fractionated Dose (Optional- Include Units): 237.6 cGy
Treatment Time / Fractionation (Optional- Include Units): 0.36 min
Total Number Of Fractions: 25
Total Dose (Optional-Please Include Units): 2692
Fractions / Week Rx 2: 5
Total Number Of Fractions Rx 2: 15
Dose Per Fractionation In Cgy (Optional): 237.6
Computed Treatment Time In Min (Will Render The Same As Calculated Treatment Time If Left Blank): 0.36
Day Of The Week Treatment Administered: Wednesday
Fractionation Number: 7
Cumulative Dose In Cgy (Optional): 1663.2
Assessment: Appropriate reaction
Comments: RTOG 0
Information: Selecting Yes will display possible errors in your note based on the variables you have selected. This validation is only offered as a suggestion for you. PLEASE NOTE THAT THE VALIDATION TEXT WILL BE REMOVED WHEN YOU FINALIZE YOUR NOTE. IF YOU WANT TO FAX A PRELIMINARY NOTE YOU WILL NEED TO TOGGLE THIS TO 'NO' IF YOU DO NOT WANT IT IN YOUR FAXED NOTE.

## 2021-03-17 NOTE — PROCEDURE: TREATMENT REGIMEN
Plan: This patient has been treated today with image guided superficial radiation therapy for non-melanoma\\nskin cancer. Written informed consent has been previously obtained from this patient for this treatment. This\\nconsent is documented in the patient’s chart. The patient gave verbal consent to continue treatment\\ntoday. The patient was treated with a specific radiation dose and setup as prescribed by the provider listed on\\nthis visit note. A Radiation Therapist performed administration of radiation under supervision of\\nprovider. The treatment parameters and cumulative dose are indicated above. Prior to administering the radiation, the patient underwent a verification therapeutic radiology simulation-aided field setting defining relevant normal and abnormal target anatomy and acquiring images with high frequency ultrasound in addition to data necessary developing optimal radiation treatment process for the patient. This process includes verification of the treatment port(s) and proper treatment positioning. All treatment ports were photographed within electronic medical record. The\\npatient’s customized lead blocking along with gross tumor volume and margin was confirmed. Considering superficial radiotherapy is clinical in setup, this requires physician and radiation therapist to clarify location interest being treated against initial images, pathology and patient anatomy. Care was taken ensuring fields treated were geometrically accurate and properly positioned using therapeutic radiology simulation-aided field setting verification per fraction. This\\nprocess is also utilized to determine if any prescription or setup changes are necessary. These steps are\\ntherefore medically necessary ensuring safe and effective administration of radiation. Ongoing therapeutic radiology simulation-aided field setting verification is ordered throughout course of therapy. A high frequency ultrasound image was acquired today for two-dimensional evaluation of the tumor volume and response to treatment, in addition to geometric accuracy of field placement. US depth Is 1.08 mm, which is 0.05 mm in difference from previous imaging. The field placement and ultrasound imaging, per fraction, is separate and distinct from the initial simulation, and is an important task in providing safe administration of superficial radiation therapy. Physician evaluation of\\nthe ultrasound tumor depth will be ongoing throughout the course of treatment and is deemed medically necessary in order to ensure the efficacy of treatment and any necessary changes. Today’s image was evaluated for determination of continuation of treatment with the current plan or with necessary changes as appropriate. According to provider review of verification therapeutic radiology simulation-aided field setting and imaging, no change is required. Additionally, the use of ultrasound visualization and targeted assessment allows the patient to be able to see their cancer(s) progress, encouraging patient to complete and maintain compliance through full course of radiotherapy. Per Dr. Gao, continued ultrasound guidance and therapeutic radiology simulation-aided field setting verification per fraction is required for field placement, measurement of tumor depth, progress and acute effect monitoring.\\n\\n
Detail Level: Zone

## 2021-03-17 NOTE — PROCEDURE: SUPERFICIAL RADIATION TREATMENT
Day Of The Week Treatment Administered: Wednesday
Patient Positioning: Sitting
Time Dose Fractionation (Optional- Include Units If Applicable): 98
Simple Simulation Preamble Text Will Be Included With Simple Simulations (.......... Indications): Simple simulation was performed today for the following reasons:
Number Of Treatment Days: 1
Anand For Simulation Without Treatment Device Design (Simple Simulation): No
Bill For Radiation Treatment: Yes
Field Size (Applicator): 4.0 cm
Cumulative Dose In Cgy (Optional): 1663.2
Total Dose (Optional-Please Include Units): 5866
Functional Status: 0 (fully active)
Computed Treatment Time In Min (Will Render The Same As Calculated Treatment Time If Left Blank): 0.36
Initial Radiation Treatment Planning (Will Render If Bill Simulation = Yes): The patient had a complete consultation regarding all applicable modalities for the treatment of their skin cancer and based on a variety of factors including the type of tumor, size, and location, the relevant medical history as well as local tissue factors, the functional status of the individual, the ability to perform necessary postoperative wound instructions and the need for simultaneous treatments as well as overall wound healing status, it was determined that the patient would begin radiation therapy treatment for skin cancer.  A full simulation and treatment device design was performed including the determination and formulation of appropriate simple and complex devices including lead shield of 0.762 mm thickness to form molded customized shielding to specifically correlate with the lesion size including treatment margin.  The custom lead shield is adequate to accommodate the appropriate applicator and provide adequate shielding around the treatment site.  The specific field applicator, shields, and devices both simple and complex as well as the specific patient setup is outlined below.  The patient was given a full consent for superficial radiation to both verbally and in writing and the full determination of patient's eligibility for treatment and selection is outlined on the patient eligibility and treatment selection form.  The specific superficial radiotherapy prescription was determined and was documented on the superficial radiotherapy prescription form.  A treatment calculation was also performed and documented on the treatment calculation form.  Based on the prescription, the patient was scheduled for a series of fractional treatments.
Fractions / Week: 3
Port Dimensions-X Axis In Cm: 3.5
Energy (Optional-Please Include Units): 100 KV
Fractions / Week Rx 4: 5
Total Number Of Fractions Rx 3: 15
Shielding Size (Optional- Include Units): 3.5 cm x 3.5 cm
Daily Fractionated Dose (Optional- Include Units): 237.6 cGy
Dimensions-Y Axis In Cm: 1.4
Detail Level: Zone
Fractionation Number: 7
Dose Per Fractionation In Cgy (Optional): 237.6
Comments: RTOG 0
Custom Shielding Preamble Text Will Not Be Included With Simple Simulations (.......... X X Y Cm): A lead shield of 0.762 mm thickness is utilized to form a molded, custom shield with a
Treatment Device Design After Initial Simulation Justification (Will Render If Bill For Treatment Devices = Yes): The patient is status post radiation simulation and is evaluated as to the use of additional devices for shielding and placement for radiation therapy.
Treatment Time / Fractionation (Optional- Include Units): 0.36 min
Dimensions-X Axis In Cm: 1.9
Information: Selecting Yes will display possible errors in your note based on the variables you have selected. This validation is only offered as a suggestion for you. PLEASE NOTE THAT THE VALIDATION TEXT WILL BE REMOVED WHEN YOU FINALIZE YOUR NOTE. IF YOU WANT TO FAX A PRELIMINARY NOTE YOU WILL NEED TO TOGGLE THIS TO 'NO' IF YOU DO NOT WANT IT IN YOUR FAXED NOTE.
Treatment Margins In Cm: 0.5
Assessment: Appropriate reaction
Total Number Of Fractions: 25
Depth (Optional-Please Include Units): 2.07 mm
Custom Shielding Afterword Text Will Not Be Included With Simple Simulations (X X Y Cm............): port to correlate with the lesion size, including treatment margin. The custom lead shield is adequate to accommodate the appropriate applicator and provide adequate shielding around the treatment site. Additional shielding (as noted below) is used to protect sensitive, normal tissues.
Limb Positioning Or Elevation: elevated
Additional Prescription Justification Text: If there is any interruption in treatment exceeding 5 days please see Decay and Dose Adjustment Calculation and complete treatment under Prescription 2, 3 or 4 as appropriate.

## 2021-03-18 ENCOUNTER — APPOINTMENT (RX ONLY)
Dept: URBAN - METROPOLITAN AREA CLINIC 30 | Facility: CLINIC | Age: 86
Setting detail: DERMATOLOGY
End: 2021-03-18

## 2021-03-18 ENCOUNTER — APPOINTMENT (RX ONLY)
Dept: URBAN - METROPOLITAN AREA CLINIC 29 | Facility: CLINIC | Age: 86
Setting detail: DERMATOLOGY
End: 2021-03-18

## 2021-03-18 PROBLEM — C44.722 SQUAMOUS CELL CARCINOMA OF SKIN OF RIGHT LOWER LIMB, INCLUDING HIP: Status: ACTIVE | Noted: 2021-03-18

## 2021-03-18 PROCEDURE — 77280 THER RAD SIMULAJ FIELD SMPL: CPT

## 2021-03-18 PROCEDURE — ? SUPERFICIAL RADIATION TREATMENT

## 2021-03-18 PROCEDURE — G6001 ECHO GUIDANCE RADIOTHERAPY: HCPCS

## 2021-03-18 PROCEDURE — 77401: CPT

## 2021-03-18 PROCEDURE — ? FOLLOW UP FOR NEXT VISIT

## 2021-03-18 PROCEDURE — ? PHE RELATED SUPPLIES PROVIDED/DISPENSED

## 2021-03-18 PROCEDURE — ? TREATMENT REGIMEN

## 2021-03-18 NOTE — PROCEDURE: SUPERFICIAL RADIATION TREATMENT
Detail Level: Zone
Field Number: 1
Dimensions-X Axis In Cm: 1.9
Dimensions-Y Axis In Cm: 1.4
Treatment Margins In Cm: 0.5
Render Additional Prescriptions In Note?: No
Render Patient Eligibility And Selection In Note?: Yes
Functional Status: 0 (fully active)
Port Dimensions-X Axis In Cm: 3.5
Field Size (Applicator): 4.0 cm
Patient Positioning: Sitting
Limb Positioning Or Elevation: elevated
Custom Shielding Preamble Text Will Not Be Included With Simple Simulations (.......... X X Y Cm): A lead shield of 0.762 mm thickness is utilized to form a molded, custom shield with a
Custom Shielding Afterword Text Will Not Be Included With Simple Simulations (X X Y Cm............): port to correlate with the lesion size, including treatment margin. The custom lead shield is adequate to accommodate the appropriate applicator and provide adequate shielding around the treatment site. Additional shielding (as noted below) is used to protect sensitive, normal tissues.
Initial Radiation Treatment Planning (Will Render If Bill Simulation = Yes): The patient had a complete consultation regarding all applicable modalities for the treatment of their skin cancer and based on a variety of factors including the type of tumor, size, and location, the relevant medical history as well as local tissue factors, the functional status of the individual, the ability to perform necessary postoperative wound instructions and the need for simultaneous treatments as well as overall wound healing status, it was determined that the patient would begin radiation therapy treatment for skin cancer.  A full simulation and treatment device design was performed including the determination and formulation of appropriate simple and complex devices including lead shield of 0.762 mm thickness to form molded customized shielding to specifically correlate with the lesion size including treatment margin.  The custom lead shield is adequate to accommodate the appropriate applicator and provide adequate shielding around the treatment site.  The specific field applicator, shields, and devices both simple and complex as well as the specific patient setup is outlined below.  The patient was given a full consent for superficial radiation to both verbally and in writing and the full determination of patient's eligibility for treatment and selection is outlined on the patient eligibility and treatment selection form.  The specific superficial radiotherapy prescription was determined and was documented on the superficial radiotherapy prescription form.  A treatment calculation was also performed and documented on the treatment calculation form.  Based on the prescription, the patient was scheduled for a series of fractional treatments.
Simple Simulation Preamble Text Will Be Included With Simple Simulations (.......... Indications): Simple simulation was performed today for the following reasons:
Treatment Device Design After Initial Simulation Justification (Will Render If Bill For Treatment Devices = Yes): The patient is status post radiation simulation and is evaluated as to the use of additional devices for shielding and placement for radiation therapy.
Additional Prescription Justification Text: If there is any interruption in treatment exceeding 5 days please see Decay and Dose Adjustment Calculation and complete treatment under Prescription 2, 3 or 4 as appropriate.
Shielding Size (Optional- Include Units): 3.5 cm x 3.5 cm
Energy (Optional-Please Include Units): 100 KV
Depth (Optional-Please Include Units): 2.07 mm
Fractions / Week: 3
Time Dose Fractionation (Optional- Include Units If Applicable): 98
Daily Fractionated Dose (Optional- Include Units): 237.6 cGy
Treatment Time / Fractionation (Optional- Include Units): 0.36 min
Total Number Of Fractions: 25
Total Dose (Optional-Please Include Units): 1014
Fractions / Week Rx 2: 5
Total Number Of Fractions Rx 2: 15
Dose Per Fractionation In Cgy (Optional): 237.6
Computed Treatment Time In Min (Will Render The Same As Calculated Treatment Time If Left Blank): 0.36
Day Of The Week Treatment Administered: Thursday
Fractionation Number: 8
Cumulative Dose In Cgy (Optional): 1900.8
Assessment: Appropriate reaction
Comments: RTOG 0
Information: Selecting Yes will display possible errors in your note based on the variables you have selected. This validation is only offered as a suggestion for you. PLEASE NOTE THAT THE VALIDATION TEXT WILL BE REMOVED WHEN YOU FINALIZE YOUR NOTE. IF YOU WANT TO FAX A PRELIMINARY NOTE YOU WILL NEED TO TOGGLE THIS TO 'NO' IF YOU DO NOT WANT IT IN YOUR FAXED NOTE.

## 2021-03-18 NOTE — PROCEDURE: TREATMENT REGIMEN
Plan: This patient has been treated today with image guided superficial radiation therapy for non-melanoma\\nskin cancer. Written informed consent has been previously obtained from this patient for this treatment. This\\nconsent is documented in the patient’s chart. The patient gave verbal consent to continue treatment\\ntoday. The patient was treated with a specific radiation dose and setup as prescribed by the provider listed on\\nthis visit note. A Radiation Therapist performed administration of radiation under supervision of\\nprovider. The treatment parameters and cumulative dose are indicated above. Prior to administering the radiation, the patient underwent a verification therapeutic radiology simulation-aided field setting defining relevant normal and abnormal target anatomy and acquiring images with high frequency ultrasound in addition to data necessary developing optimal radiation treatment process for the patient. This process includes verification of the treatment port(s) and proper treatment positioning. All treatment ports were photographed within electronic medical record. The\\npatient’s customized lead blocking along with gross tumor volume and margin was confirmed. Considering superficial radiotherapy is clinical in setup, this requires physician and radiation therapist to clarify location interest being treated against initial images, pathology and patient anatomy. Care was taken ensuring fields treated were geometrically accurate and properly positioned using therapeutic radiology simulation-aided field setting verification per fraction. This\\nprocess is also utilized to determine if any prescription or setup changes are necessary. These steps are\\ntherefore medically necessary ensuring safe and effective administration of radiation. Ongoing therapeutic radiology simulation-aided field setting verification is ordered throughout course of therapy. A high frequency ultrasound image was acquired today for two-dimensional evaluation of the tumor volume and response to treatment, in addition to geometric accuracy of field placement. US depth Is 1.04 mm, which is 0.04 mm in difference from previous imaging. The field placement and ultrasound imaging, per fraction, is separate and distinct from the initial simulation, and is an important task in providing safe administration of superficial radiation therapy. Physician evaluation of\\nthe ultrasound tumor depth will be ongoing throughout the course of treatment and is deemed medically necessary in order to ensure the efficacy of treatment and any necessary changes. Today’s image was evaluated for determination of continuation of treatment with the current plan or with necessary changes as appropriate. According to provider review of verification therapeutic radiology simulation-aided field setting and imaging, no change is required. Additionally, the use of ultrasound visualization and targeted assessment allows the patient to be able to see their cancer(s) progress, encouraging patient to complete and maintain compliance through full course of radiotherapy. Per Dr. Gao, continued ultrasound guidance and therapeutic radiology simulation-aided field setting verification per fraction is required for field placement, measurement of tumor depth, progress and acute effect monitoring.\\n\\n
Detail Level: Zone

## 2021-03-18 NOTE — PROCEDURE: TREATMENT REGIMEN
Plan: This patient has been treated today with image guided superficial radiation therapy for non-melanoma\\nskin cancer. Written informed consent has been previously obtained from this patient for this treatment. This\\nconsent is documented in the patient’s chart. The patient gave verbal consent to continue treatment\\ntoday. The patient was treated with a specific radiation dose and setup as prescribed by the provider listed on\\nthis visit note. A Radiation Therapist performed administration of radiation under supervision of\\nprovider. The treatment parameters and cumulative dose are indicated above. Prior to administering the radiation, the patient underwent a verification therapeutic radiology simulation-aided field setting defining relevant normal and abnormal target anatomy and acquiring images with high frequency ultrasound in addition to data necessary developing optimal radiation treatment process for the patient. This process includes verification of the treatment port(s) and proper treatment positioning. All treatment ports were photographed within electronic medical record. The\\npatient’s customized lead blocking along with gross tumor volume and margin was confirmed. Considering superficial radiotherapy is clinical in setup, this requires physician and radiation therapist to clarify location interest being treated against initial images, pathology and patient anatomy. Care was taken ensuring fields treated were geometrically accurate and properly positioned using therapeutic radiology simulation-aided field setting verification per fraction. This\\nprocess is also utilized to determine if any prescription or setup changes are necessary. These steps are\\ntherefore medically necessary ensuring safe and effective administration of radiation. Ongoing therapeutic radiology simulation-aided field setting verification is ordered throughout course of therapy. A high frequency ultrasound image was acquired today for two-dimensional evaluation of the tumor volume and response to treatment, in addition to geometric accuracy of field placement. US depth Is 1.04 mm, which is 0.04 mm in difference from previous imaging. The field placement and ultrasound imaging, per fraction, is separate and distinct from the initial simulation, and is an important task in providing safe administration of superficial radiation therapy. Physician evaluation of\\nthe ultrasound tumor depth will be ongoing throughout the course of treatment and is deemed medically necessary in order to ensure the efficacy of treatment and any necessary changes. Today’s image was evaluated for determination of continuation of treatment with the current plan or with necessary changes as appropriate. According to provider review of verification therapeutic radiology simulation-aided field setting and imaging, no change is required. Additionally, the use of ultrasound visualization and targeted assessment allows the patient to be able to see their cancer(s) progress, encouraging patient to complete and maintain compliance through full course of radiotherapy. Per Dr. Mitchell, continued ultrasound guidance and therapeutic radiology simulation-aided field setting verification per fraction is required for field placement, measurement of tumor depth, progress and acute effect monitoring.\\n\\n
Detail Level: Zone

## 2021-03-18 NOTE — PROCEDURE: SUPERFICIAL RADIATION TREATMENT
Validate Note Data (See Information Below): Yes
Treatment Device Design After Initial Simulation Justification (Will Render If Bill For Treatment Devices = Yes): The patient is status post radiation simulation and is evaluated as to the use of additional devices for shielding and placement for radiation therapy.
Treatment Time In Min (Optional): 0.36
Detail Level: Zone
Initial Radiation Treatment Planning (Will Render If Bill Simulation = Yes): The patient had a complete consultation regarding all applicable modalities for the treatment of their skin cancer and based on a variety of factors including the type of tumor, size, and location, the relevant medical history as well as local tissue factors, the functional status of the individual, the ability to perform necessary postoperative wound instructions and the need for simultaneous treatments as well as overall wound healing status, it was determined that the patient would begin radiation therapy treatment for skin cancer.  A full simulation and treatment device design was performed including the determination and formulation of appropriate simple and complex devices including lead shield of 0.762 mm thickness to form molded customized shielding to specifically correlate with the lesion size including treatment margin.  The custom lead shield is adequate to accommodate the appropriate applicator and provide adequate shielding around the treatment site.  The specific field applicator, shields, and devices both simple and complex as well as the specific patient setup is outlined below.  The patient was given a full consent for superficial radiation to both verbally and in writing and the full determination of patient's eligibility for treatment and selection is outlined on the patient eligibility and treatment selection form.  The specific superficial radiotherapy prescription was determined and was documented on the superficial radiotherapy prescription form.  A treatment calculation was also performed and documented on the treatment calculation form.  Based on the prescription, the patient was scheduled for a series of fractional treatments.
Fractionation Number: 8
Anand For Simulation Without Treatment Device Design (Simple Simulation): No
Dose Per Fractionation In Cgy (Optional): 237.6
Time Dose Fractionation (Optional- Include Units If Applicable): 98
Total Dose (Optional-Please Include Units): 2116
Number Of Days Off Treatment: 1
Custom Shielding Afterword Text Will Not Be Included With Simple Simulations (X X Y Cm............): port to correlate with the lesion size, including treatment margin. The custom lead shield is adequate to accommodate the appropriate applicator and provide adequate shielding around the treatment site. Additional shielding (as noted below) is used to protect sensitive, normal tissues.
Energy (Optional-Please Include Units): 100 KV
Patient Positioning: Sitting
Comments: RTOG 0
Dimensions-X Axis In Cm: 1.9
Additional Prescription Justification Text: If there is any interruption in treatment exceeding 5 days please see Decay and Dose Adjustment Calculation and complete treatment under Prescription 2, 3 or 4 as appropriate.
Daily Fractionated Dose (Optional- Include Units): 237.6 cGy
Simple Simulation Preamble Text Will Be Included With Simple Simulations (.......... Indications): Simple simulation was performed today for the following reasons:
Functional Status: 0 (fully active)
Shielding Size (Optional- Include Units): 3.5 cm x 3.5 cm
Information: Selecting Yes will display possible errors in your note based on the variables you have selected. This validation is only offered as a suggestion for you. PLEASE NOTE THAT THE VALIDATION TEXT WILL BE REMOVED WHEN YOU FINALIZE YOUR NOTE. IF YOU WANT TO FAX A PRELIMINARY NOTE YOU WILL NEED TO TOGGLE THIS TO 'NO' IF YOU DO NOT WANT IT IN YOUR FAXED NOTE.
Treatment Time / Fractionation (Optional- Include Units): 0.36 min
Field Size (Applicator): 4.0 cm
Day Of The Week Treatment Administered: Thursday
Depth (Optional-Please Include Units): 2.07 mm
Total Number Of Fractions Rx 3: 15
Dimensions-Y Axis In Cm: 1.4
Fractions / Week Rx 4: 5
Cumulative Dose In Cgy (Optional): 1900.8
Port Dimensions-X Axis In Cm: 3.5
Fractions / Week: 3
Limb Positioning Or Elevation: elevated
Total Number Of Fractions: 25
Treatment Margins In Cm: 0.5
Custom Shielding Preamble Text Will Not Be Included With Simple Simulations (.......... X X Y Cm): A lead shield of 0.762 mm thickness is utilized to form a molded, custom shield with a
Assessment: Appropriate reaction

## 2021-03-24 ENCOUNTER — APPOINTMENT (RX ONLY)
Dept: URBAN - METROPOLITAN AREA CLINIC 29 | Facility: CLINIC | Age: 86
Setting detail: DERMATOLOGY
End: 2021-03-24

## 2021-03-24 ENCOUNTER — APPOINTMENT (RX ONLY)
Dept: URBAN - METROPOLITAN AREA CLINIC 30 | Facility: CLINIC | Age: 86
Setting detail: DERMATOLOGY
End: 2021-03-24

## 2021-03-24 PROBLEM — C44.722 SQUAMOUS CELL CARCINOMA OF SKIN OF RIGHT LOWER LIMB, INCLUDING HIP: Status: ACTIVE | Noted: 2021-03-24

## 2021-03-24 PROCEDURE — ? TREATMENT REGIMEN

## 2021-03-24 PROCEDURE — ? PHE RELATED SUPPLIES PROVIDED/DISPENSED

## 2021-03-24 PROCEDURE — ? SUPERFICIAL RADIATION TREATMENT

## 2021-03-24 PROCEDURE — G6001 ECHO GUIDANCE RADIOTHERAPY: HCPCS

## 2021-03-24 PROCEDURE — ? FOLLOW UP FOR NEXT VISIT

## 2021-03-24 PROCEDURE — 77280 THER RAD SIMULAJ FIELD SMPL: CPT

## 2021-03-24 PROCEDURE — 77401: CPT

## 2021-03-24 NOTE — PROCEDURE: TREATMENT REGIMEN
Detail Level: Zone
Plan: This patient has been treated today with image guided superficial radiation therapy for non-melanoma\\nskin cancer. Written informed consent has been previously obtained from this patient for this treatment. This\\nconsent is documented in the patient’s chart. The patient gave verbal consent to continue treatment\\ntoday. The patient was treated with a specific radiation dose and setup as prescribed by the provider listed on\\nthis visit note. A Radiation Therapist performed administration of radiation under supervision of\\nprovider. The treatment parameters and cumulative dose are indicated above. Prior to administering the radiation, the patient underwent a verification therapeutic radiology simulation-aided field setting defining relevant normal and abnormal target anatomy and acquiring images with high frequency ultrasound in addition to data necessary developing optimal radiation treatment process for the patient. This process includes verification of the treatment port(s) and proper treatment positioning. All treatment ports were photographed within electronic medical record. The\\npatient’s customized lead blocking along with gross tumor volume and margin was confirmed. Considering superficial radiotherapy is clinical in setup, this requires physician and radiation therapist to clarify location interest being treated against initial images, pathology and patient anatomy. Care was taken ensuring fields treated were geometrically accurate and properly positioned using therapeutic radiology simulation-aided field setting verification per fraction. This\\nprocess is also utilized to determine if any prescription or setup changes are necessary. These steps are\\ntherefore medically necessary ensuring safe and effective administration of radiation. Ongoing therapeutic radiology simulation-aided field setting verification is ordered throughout course of therapy. A high frequency ultrasound image was acquired today for two-dimensional evaluation of the tumor volume and response to treatment, in addition to geometric accuracy of field placement. US depth Is .94 mm, which is 0.10 mm in difference from previous imaging. The field placement and ultrasound imaging, per fraction, is separate and distinct from the initial simulation, and is an important task in providing safe administration of superficial radiation therapy. Physician evaluation of\\nthe ultrasound tumor depth will be ongoing throughout the course of treatment and is deemed medically necessary in order to ensure the efficacy of treatment and any necessary changes. Today’s image was evaluated for determination of continuation of treatment with the current plan or with necessary changes as appropriate. According to provider review of verification therapeutic radiology simulation-aided field setting and imaging, no change is required. Additionally, the use of ultrasound visualization and targeted assessment allows the patient to be able to see their cancer(s) progress, encouraging patient to complete and maintain compliance through full course of radiotherapy. Per Dr. Mitchell, continued ultrasound guidance and therapeutic radiology simulation-aided field setting verification per fraction is required for field placement, measurement of tumor depth, progress and acute effect monitoring.\\n\\n

## 2021-03-24 NOTE — PROCEDURE: TREATMENT REGIMEN
Plan: This patient has been treated today with image guided superficial radiation therapy for non-melanoma\\nskin cancer. Written informed consent has been previously obtained from this patient for this treatment. This\\nconsent is documented in the patient’s chart. The patient gave verbal consent to continue treatment\\ntoday. The patient was treated with a specific radiation dose and setup as prescribed by the provider listed on\\nthis visit note. A Radiation Therapist performed administration of radiation under supervision of\\nprovider. The treatment parameters and cumulative dose are indicated above. Prior to administering the radiation, the patient underwent a verification therapeutic radiology simulation-aided field setting defining relevant normal and abnormal target anatomy and acquiring images with high frequency ultrasound in addition to data necessary developing optimal radiation treatment process for the patient. This process includes verification of the treatment port(s) and proper treatment positioning. All treatment ports were photographed within electronic medical record. The\\npatient’s customized lead blocking along with gross tumor volume and margin was confirmed. Considering superficial radiotherapy is clinical in setup, this requires physician and radiation therapist to clarify location interest being treated against initial images, pathology and patient anatomy. Care was taken ensuring fields treated were geometrically accurate and properly positioned using therapeutic radiology simulation-aided field setting verification per fraction. This\\nprocess is also utilized to determine if any prescription or setup changes are necessary. These steps are\\ntherefore medically necessary ensuring safe and effective administration of radiation. Ongoing therapeutic radiology simulation-aided field setting verification is ordered throughout course of therapy. A high frequency ultrasound image was acquired today for two-dimensional evaluation of the tumor volume and response to treatment, in addition to geometric accuracy of field placement. US depth Is .94 mm, which is 0.10 mm in difference from previous imaging. The field placement and ultrasound imaging, per fraction, is separate and distinct from the initial simulation, and is an important task in providing safe administration of superficial radiation therapy. Physician evaluation of\\nthe ultrasound tumor depth will be ongoing throughout the course of treatment and is deemed medically necessary in order to ensure the efficacy of treatment and any necessary changes. Today’s image was evaluated for determination of continuation of treatment with the current plan or with necessary changes as appropriate. According to provider review of verification therapeutic radiology simulation-aided field setting and imaging, no change is required. Additionally, the use of ultrasound visualization and targeted assessment allows the patient to be able to see their cancer(s) progress, encouraging patient to complete and maintain compliance through full course of radiotherapy. Per Dr. Gao, continued ultrasound guidance and therapeutic radiology simulation-aided field setting verification per fraction is required for field placement, measurement of tumor depth, progress and acute effect monitoring.\\n\\n
Detail Level: Zone

## 2021-03-24 NOTE — PROCEDURE: SUPERFICIAL RADIATION TREATMENT
Detail Level: Zone
Field Number: 1
Dimensions-X Axis In Cm: 1.9
Dimensions-Y Axis In Cm: 1.4
Treatment Margins In Cm: 0.5
Render Additional Prescriptions In Note?: No
Render Patient Eligibility And Selection In Note?: Yes
Functional Status: 0 (fully active)
Port Dimensions-X Axis In Cm: 3.5
Field Size (Applicator): 4.0 cm
Patient Positioning: Sitting
Limb Positioning Or Elevation: elevated
Custom Shielding Preamble Text Will Not Be Included With Simple Simulations (.......... X X Y Cm): A lead shield of 0.762 mm thickness is utilized to form a molded, custom shield with a
Custom Shielding Afterword Text Will Not Be Included With Simple Simulations (X X Y Cm............): port to correlate with the lesion size, including treatment margin. The custom lead shield is adequate to accommodate the appropriate applicator and provide adequate shielding around the treatment site. Additional shielding (as noted below) is used to protect sensitive, normal tissues.
Initial Radiation Treatment Planning (Will Render If Bill Simulation = Yes): The patient had a complete consultation regarding all applicable modalities for the treatment of their skin cancer and based on a variety of factors including the type of tumor, size, and location, the relevant medical history as well as local tissue factors, the functional status of the individual, the ability to perform necessary postoperative wound instructions and the need for simultaneous treatments as well as overall wound healing status, it was determined that the patient would begin radiation therapy treatment for skin cancer.  A full simulation and treatment device design was performed including the determination and formulation of appropriate simple and complex devices including lead shield of 0.762 mm thickness to form molded customized shielding to specifically correlate with the lesion size including treatment margin.  The custom lead shield is adequate to accommodate the appropriate applicator and provide adequate shielding around the treatment site.  The specific field applicator, shields, and devices both simple and complex as well as the specific patient setup is outlined below.  The patient was given a full consent for superficial radiation to both verbally and in writing and the full determination of patient's eligibility for treatment and selection is outlined on the patient eligibility and treatment selection form.  The specific superficial radiotherapy prescription was determined and was documented on the superficial radiotherapy prescription form.  A treatment calculation was also performed and documented on the treatment calculation form.  Based on the prescription, the patient was scheduled for a series of fractional treatments.
Simple Simulation Preamble Text Will Be Included With Simple Simulations (.......... Indications): Simple simulation was performed today for the following reasons:
Treatment Device Design After Initial Simulation Justification (Will Render If Bill For Treatment Devices = Yes): The patient is status post radiation simulation and is evaluated as to the use of additional devices for shielding and placement for radiation therapy.
Additional Prescription Justification Text: If there is any interruption in treatment exceeding 5 days please see Decay and Dose Adjustment Calculation and complete treatment under Prescription 2, 3 or 4 as appropriate.
Shielding Size (Optional- Include Units): 3.5 cm x 3.5 cm
Energy (Optional-Please Include Units): 100 KV
Depth (Optional-Please Include Units): 2.07 mm
Fractions / Week: 3
Time Dose Fractionation (Optional- Include Units If Applicable): 98
Daily Fractionated Dose (Optional- Include Units): 237.6 cGy
Treatment Time / Fractionation (Optional- Include Units): 0.36 min
Total Number Of Fractions: 25
Total Dose (Optional-Please Include Units): 7317
Fractions / Week Rx 2: 5
Total Number Of Fractions Rx 2: 15
Dose Per Fractionation In Cgy (Optional): 237.6
Computed Treatment Time In Min (Will Render The Same As Calculated Treatment Time If Left Blank): 0.36
Day Of The Week Treatment Administered: Wednesday
Fractionation Number: 9
Cumulative Dose In Cgy (Optional): 2138.4
Assessment: Appropriate reaction
Comments: RTOG 0
Information: Selecting Yes will display possible errors in your note based on the variables you have selected. This validation is only offered as a suggestion for you. PLEASE NOTE THAT THE VALIDATION TEXT WILL BE REMOVED WHEN YOU FINALIZE YOUR NOTE. IF YOU WANT TO FAX A PRELIMINARY NOTE YOU WILL NEED TO TOGGLE THIS TO 'NO' IF YOU DO NOT WANT IT IN YOUR FAXED NOTE.

## 2021-03-24 NOTE — PROCEDURE: SUPERFICIAL RADIATION TREATMENT
Field Number: 1
Render Text From Evaluation And Management Tab (Will Not Bill 16315): Yes
Total Number Of Fractions Rx 4: 15
Render Prescriptions In Note?: No
Port Dimensions-X Axis In Cm: 3.5
Functional Status: 0 (fully active)
Energy (Include Units): 100 KV
Comments: RTOG 0
Total Number Of Fractions: 25
Fractionation Number (Evaluation): 5
Shielding Size (Optional- Include Units): 3.5 cm x 3.5 cm
Daily Fractionated Dose (Optional- Include Units): 237.6 cGy
Custom Shielding Afterword Text Will Not Be Included With Simple Simulations (X X Y Cm............): port to correlate with the lesion size, including treatment margin. The custom lead shield is adequate to accommodate the appropriate applicator and provide adequate shielding around the treatment site. Additional shielding (as noted below) is used to protect sensitive, normal tissues.
Depth (Optional-Please Include Units): 2.07 mm
Assessment: Appropriate reaction
Additional Prescription Justification Text: If there is any interruption in treatment exceeding 5 days please see Decay and Dose Adjustment Calculation and complete treatment under Prescription 2, 3 or 4 as appropriate.
Limb Positioning Or Elevation: elevated
Fractionation Number: 9
Dimensions-X Axis In Cm: 1.9
Dose Per Fractionation In Cgy (Optional): 237.6
Treatment Time / Fractionation (Optional- Include Units): 0.36 min
Total Dose (Optional-Please Include Units): 9942
Custom Shielding Preamble Text Will Not Be Included With Simple Simulations (.......... X X Y Cm): A lead shield of 0.762 mm thickness is utilized to form a molded, custom shield with a
Treatment Device Design After Initial Simulation Justification (Will Render If Bill For Treatment Devices = Yes): The patient is status post radiation simulation and is evaluated as to the use of additional devices for shielding and placement for radiation therapy.
Field Size (Applicator): 4.0 cm
Fractions / Week: 3
Initial Radiation Treatment Planning (Will Render If Bill Simulation = Yes): The patient had a complete consultation regarding all applicable modalities for the treatment of their skin cancer and based on a variety of factors including the type of tumor, size, and location, the relevant medical history as well as local tissue factors, the functional status of the individual, the ability to perform necessary postoperative wound instructions and the need for simultaneous treatments as well as overall wound healing status, it was determined that the patient would begin radiation therapy treatment for skin cancer.  A full simulation and treatment device design was performed including the determination and formulation of appropriate simple and complex devices including lead shield of 0.762 mm thickness to form molded customized shielding to specifically correlate with the lesion size including treatment margin.  The custom lead shield is adequate to accommodate the appropriate applicator and provide adequate shielding around the treatment site.  The specific field applicator, shields, and devices both simple and complex as well as the specific patient setup is outlined below.  The patient was given a full consent for superficial radiation to both verbally and in writing and the full determination of patient's eligibility for treatment and selection is outlined on the patient eligibility and treatment selection form.  The specific superficial radiotherapy prescription was determined and was documented on the superficial radiotherapy prescription form.  A treatment calculation was also performed and documented on the treatment calculation form.  Based on the prescription, the patient was scheduled for a series of fractional treatments.
Dimensions-Y Axis In Cm: 1.4
Detail Level: Zone
Treatment Time In Min (Optional): 0.36
Information: Selecting Yes will display possible errors in your note based on the variables you have selected. This validation is only offered as a suggestion for you. PLEASE NOTE THAT THE VALIDATION TEXT WILL BE REMOVED WHEN YOU FINALIZE YOUR NOTE. IF YOU WANT TO FAX A PRELIMINARY NOTE YOU WILL NEED TO TOGGLE THIS TO 'NO' IF YOU DO NOT WANT IT IN YOUR FAXED NOTE.
Patient Positioning: Sitting
Cumulative Dose In Cgy (Optional): 2138.4
Day Of The Week Treatment Administered: Wednesday
Time Dose Fractionation (Optional- Include Units If Applicable): 98
Simple Simulation Preamble Text Will Be Included With Simple Simulations (.......... Indications): Simple simulation was performed today for the following reasons:
Treatment Margins In Cm: 0.5

## 2021-03-25 ENCOUNTER — APPOINTMENT (RX ONLY)
Dept: URBAN - METROPOLITAN AREA CLINIC 29 | Facility: CLINIC | Age: 86
Setting detail: DERMATOLOGY
End: 2021-03-25

## 2021-03-25 ENCOUNTER — APPOINTMENT (RX ONLY)
Dept: URBAN - METROPOLITAN AREA CLINIC 30 | Facility: CLINIC | Age: 86
Setting detail: DERMATOLOGY
End: 2021-03-25

## 2021-03-25 PROBLEM — C44.722 SQUAMOUS CELL CARCINOMA OF SKIN OF RIGHT LOWER LIMB, INCLUDING HIP: Status: ACTIVE | Noted: 2021-03-25

## 2021-03-25 PROCEDURE — G6001 ECHO GUIDANCE RADIOTHERAPY: HCPCS

## 2021-03-25 PROCEDURE — ? SUPERFICIAL RADIATION TREATMENT

## 2021-03-25 PROCEDURE — ? TREATMENT REGIMEN

## 2021-03-25 PROCEDURE — ? PHE RELATED SUPPLIES PROVIDED/DISPENSED

## 2021-03-25 PROCEDURE — ? FOLLOW UP FOR NEXT VISIT

## 2021-03-25 PROCEDURE — 77280 THER RAD SIMULAJ FIELD SMPL: CPT

## 2021-03-25 PROCEDURE — 77401: CPT

## 2021-03-25 NOTE — PROCEDURE: TREATMENT REGIMEN
Plan: This patient has been treated today with image guided superficial radiation therapy for non-melanoma\\nskin cancer. Written informed consent has been previously obtained from this patient for this treatment. This\\nconsent is documented in the patient’s chart. The patient gave verbal consent to continue treatment\\ntoday. The patient was treated with a specific radiation dose and setup as prescribed by the provider listed on\\nthis visit note. A Radiation Therapist performed administration of radiation under supervision of\\nprovider. The treatment parameters and cumulative dose are indicated above. Prior to administering the radiation, the patient underwent a verification therapeutic radiology simulation-aided field setting defining relevant normal and abnormal target anatomy and acquiring images with high frequency ultrasound in addition to data necessary developing optimal radiation treatment process for the patient. This process includes verification of the treatment port(s) and proper treatment positioning. All treatment ports were photographed within electronic medical record. The\\npatient’s customized lead blocking along with gross tumor volume and margin was confirmed. Considering superficial radiotherapy is clinical in setup, this requires physician and radiation therapist to clarify location interest being treated against initial images, pathology and patient anatomy. Care was taken ensuring fields treated were geometrically accurate and properly positioned using therapeutic radiology simulation-aided field setting verification per fraction. This\\nprocess is also utilized to determine if any prescription or setup changes are necessary. These steps are\\ntherefore medically necessary ensuring safe and effective administration of radiation. Ongoing therapeutic radiology simulation-aided field setting verification is ordered throughout course of therapy. A high frequency ultrasound image was acquired today for two-dimensional evaluation of the tumor volume and response to treatment, in addition to geometric accuracy of field placement. US depth Is .87 mm, which is 0.07 mm in difference from previous imaging. The field placement and ultrasound imaging, per fraction, is separate and distinct from the initial simulation, and is an important task in providing safe administration of superficial radiation therapy. Physician evaluation of\\nthe ultrasound tumor depth will be ongoing throughout the course of treatment and is deemed medically necessary in order to ensure the efficacy of treatment and any necessary changes. Today’s image was evaluated for determination of continuation of treatment with the current plan or with necessary changes as appropriate. According to provider review of verification therapeutic radiology simulation-aided field setting and imaging, no change is required. Additionally, the use of ultrasound visualization and targeted assessment allows the patient to be able to see their cancer(s) progress, encouraging patient to complete and maintain compliance through full course of radiotherapy. Per Dr. Gao, continued ultrasound guidance and therapeutic radiology simulation-aided field setting verification per fraction is required for field placement, measurement of tumor depth, progress and acute effect monitoring.\\n\\n
Detail Level: Zone

## 2021-03-25 NOTE — PROCEDURE: TREATMENT REGIMEN
Plan: This patient has been treated today with image guided superficial radiation therapy for non-melanoma\\nskin cancer. Written informed consent has been previously obtained from this patient for this treatment. This\\nconsent is documented in the patient’s chart. The patient gave verbal consent to continue treatment\\ntoday. The patient was treated with a specific radiation dose and setup as prescribed by the provider listed on\\nthis visit note. A Radiation Therapist performed administration of radiation under supervision of\\nprovider. The treatment parameters and cumulative dose are indicated above. Prior to administering the radiation, the patient underwent a verification therapeutic radiology simulation-aided field setting defining relevant normal and abnormal target anatomy and acquiring images with high frequency ultrasound in addition to data necessary developing optimal radiation treatment process for the patient. This process includes verification of the treatment port(s) and proper treatment positioning. All treatment ports were photographed within electronic medical record. The\\npatient’s customized lead blocking along with gross tumor volume and margin was confirmed. Considering superficial radiotherapy is clinical in setup, this requires physician and radiation therapist to clarify location interest being treated against initial images, pathology and patient anatomy. Care was taken ensuring fields treated were geometrically accurate and properly positioned using therapeutic radiology simulation-aided field setting verification per fraction. This\\nprocess is also utilized to determine if any prescription or setup changes are necessary. These steps are\\ntherefore medically necessary ensuring safe and effective administration of radiation. Ongoing therapeutic radiology simulation-aided field setting verification is ordered throughout course of therapy. A high frequency ultrasound image was acquired today for two-dimensional evaluation of the tumor volume and response to treatment, in addition to geometric accuracy of field placement. US depth Is .87 mm, which is 0.07 mm in difference from previous imaging. The field placement and ultrasound imaging, per fraction, is separate and distinct from the initial simulation, and is an important task in providing safe administration of superficial radiation therapy. Physician evaluation of\\nthe ultrasound tumor depth will be ongoing throughout the course of treatment and is deemed medically necessary in order to ensure the efficacy of treatment and any necessary changes. Today’s image was evaluated for determination of continuation of treatment with the current plan or with necessary changes as appropriate. According to provider review of verification therapeutic radiology simulation-aided field setting and imaging, no change is required. Additionally, the use of ultrasound visualization and targeted assessment allows the patient to be able to see their cancer(s) progress, encouraging patient to complete and maintain compliance through full course of radiotherapy. Per Dr. Mitchell, continued ultrasound guidance and therapeutic radiology simulation-aided field setting verification per fraction is required for field placement, measurement of tumor depth, progress and acute effect monitoring.\\n\\n
Detail Level: Zone

## 2021-03-25 NOTE — PROCEDURE: SUPERFICIAL RADIATION TREATMENT
Energy (Optional-Please Include Units): 100 KV
Custom Shielding Preamble Text Will Not Be Included With Simple Simulations (.......... X X Y Cm): A lead shield of 0.762 mm thickness is utilized to form a molded, custom shield with a
Total Number Of Fractions Rx 2: 15
Treatment Device Design After Initial Simulation Justification (Will Render If Bill For Treatment Devices = Yes): The patient is status post radiation simulation and is evaluated as to the use of additional devices for shielding and placement for radiation therapy.
Render Prescriptions In Note?: No
Daily Fractionated Dose (Optional- Include Units): 237.6 cGy
Shielding Size (Optional- Include Units): 3.5 cm x 3.5 cm
Computed Treatment Time In Min (Will Render The Same As Calculated Treatment Time If Left Blank): 0.36
Prescription Used: 1
Field Size (Applicator): 4.0 cm
Total Number Of Fractions: 25
Render Text From Evaluation And Management Tab (Will Not Bill [id number]): Yes
Custom Shielding Afterword Text Will Not Be Included With Simple Simulations (X X Y Cm............): port to correlate with the lesion size, including treatment margin. The custom lead shield is adequate to accommodate the appropriate applicator and provide adequate shielding around the treatment site. Additional shielding (as noted below) is used to protect sensitive, normal tissues.
Fractions / Week Rx 3: 5
Depth (Optional-Please Include Units): 2.07 mm
Comments: RTOG 0
Dose / Tx In Cgy (Optional): 237.6
Additional Prescription Justification Text: If there is any interruption in treatment exceeding 5 days please see Decay and Dose Adjustment Calculation and complete treatment under Prescription 2, 3 or 4 as appropriate.
Limb Positioning Or Elevation: elevated
Day Of The Week Treatment Administered: Thursday
Treatment Time / Fractionation (Optional- Include Units): 0.36 min
Port Dimensions-Y Axis In Cm: 3.5
Fractionation Number: 10
Information: Selecting Yes will display possible errors in your note based on the variables you have selected. This validation is only offered as a suggestion for you. PLEASE NOTE THAT THE VALIDATION TEXT WILL BE REMOVED WHEN YOU FINALIZE YOUR NOTE. IF YOU WANT TO FAX A PRELIMINARY NOTE YOU WILL NEED TO TOGGLE THIS TO 'NO' IF YOU DO NOT WANT IT IN YOUR FAXED NOTE.
Dimensions-Y Axis In Cm: 1.4
Detail Level: Zone
Total Dose (Optional-Please Include Units): 6991
Assessment: Appropriate reaction
Cumulative Dose In Cgy (Optional): 1445
Initial Radiation Treatment Planning (Will Render If Bill Simulation = Yes): The patient had a complete consultation regarding all applicable modalities for the treatment of their skin cancer and based on a variety of factors including the type of tumor, size, and location, the relevant medical history as well as local tissue factors, the functional status of the individual, the ability to perform necessary postoperative wound instructions and the need for simultaneous treatments as well as overall wound healing status, it was determined that the patient would begin radiation therapy treatment for skin cancer.  A full simulation and treatment device design was performed including the determination and formulation of appropriate simple and complex devices including lead shield of 0.762 mm thickness to form molded customized shielding to specifically correlate with the lesion size including treatment margin.  The custom lead shield is adequate to accommodate the appropriate applicator and provide adequate shielding around the treatment site.  The specific field applicator, shields, and devices both simple and complex as well as the specific patient setup is outlined below.  The patient was given a full consent for superficial radiation to both verbally and in writing and the full determination of patient's eligibility for treatment and selection is outlined on the patient eligibility and treatment selection form.  The specific superficial radiotherapy prescription was determined and was documented on the superficial radiotherapy prescription form.  A treatment calculation was also performed and documented on the treatment calculation form.  Based on the prescription, the patient was scheduled for a series of fractional treatments.
Fractions / Week: 3
Functional Status: 0 (fully active)
Dimensions-X Axis In Cm: 1.9
Treatment Margins In Cm: 0.5
Time Dose Fractionation (Optional- Include Units If Applicable): 98
Patient Positioning: Sitting
Simple Simulation Preamble Text Will Be Included With Simple Simulations (.......... Indications): Simple simulation was performed today for the following reasons:

## 2021-03-25 NOTE — PROCEDURE: SUPERFICIAL RADIATION TREATMENT
Detail Level: Zone
Field Number: 1
Dimensions-X Axis In Cm: 1.9
Dimensions-Y Axis In Cm: 1.4
Treatment Margins In Cm: 0.5
Render Additional Prescriptions In Note?: No
Render Patient Eligibility And Selection In Note?: Yes
Functional Status: 0 (fully active)
Port Dimensions-X Axis In Cm: 3.5
Field Size (Applicator): 4.0 cm
Patient Positioning: Sitting
Limb Positioning Or Elevation: elevated
Custom Shielding Preamble Text Will Not Be Included With Simple Simulations (.......... X X Y Cm): A lead shield of 0.762 mm thickness is utilized to form a molded, custom shield with a
Custom Shielding Afterword Text Will Not Be Included With Simple Simulations (X X Y Cm............): port to correlate with the lesion size, including treatment margin. The custom lead shield is adequate to accommodate the appropriate applicator and provide adequate shielding around the treatment site. Additional shielding (as noted below) is used to protect sensitive, normal tissues.
Initial Radiation Treatment Planning (Will Render If Bill Simulation = Yes): The patient had a complete consultation regarding all applicable modalities for the treatment of their skin cancer and based on a variety of factors including the type of tumor, size, and location, the relevant medical history as well as local tissue factors, the functional status of the individual, the ability to perform necessary postoperative wound instructions and the need for simultaneous treatments as well as overall wound healing status, it was determined that the patient would begin radiation therapy treatment for skin cancer.  A full simulation and treatment device design was performed including the determination and formulation of appropriate simple and complex devices including lead shield of 0.762 mm thickness to form molded customized shielding to specifically correlate with the lesion size including treatment margin.  The custom lead shield is adequate to accommodate the appropriate applicator and provide adequate shielding around the treatment site.  The specific field applicator, shields, and devices both simple and complex as well as the specific patient setup is outlined below.  The patient was given a full consent for superficial radiation to both verbally and in writing and the full determination of patient's eligibility for treatment and selection is outlined on the patient eligibility and treatment selection form.  The specific superficial radiotherapy prescription was determined and was documented on the superficial radiotherapy prescription form.  A treatment calculation was also performed and documented on the treatment calculation form.  Based on the prescription, the patient was scheduled for a series of fractional treatments.
Simple Simulation Preamble Text Will Be Included With Simple Simulations (.......... Indications): Simple simulation was performed today for the following reasons:
Treatment Device Design After Initial Simulation Justification (Will Render If Bill For Treatment Devices = Yes): The patient is status post radiation simulation and is evaluated as to the use of additional devices for shielding and placement for radiation therapy.
Additional Prescription Justification Text: If there is any interruption in treatment exceeding 5 days please see Decay and Dose Adjustment Calculation and complete treatment under Prescription 2, 3 or 4 as appropriate.
Shielding Size (Optional- Include Units): 3.5 cm x 3.5 cm
Energy (Optional-Please Include Units): 100 KV
Depth (Optional-Please Include Units): 2.07 mm
Fractions / Week: 3
Time Dose Fractionation (Optional- Include Units If Applicable): 98
Daily Fractionated Dose (Optional- Include Units): 237.6 cGy
Treatment Time / Fractionation (Optional- Include Units): 0.36 min
Total Number Of Fractions: 25
Total Dose (Optional-Please Include Units): 4946
Fractions / Week Rx 2: 5
Total Number Of Fractions Rx 2: 15
Dose Per Fractionation In Cgy (Optional): 237.6
Computed Treatment Time In Min (Will Render The Same As Calculated Treatment Time If Left Blank): 0.36
Day Of The Week Treatment Administered: Thursday
Fractionation Number: 10
Cumulative Dose In Cgy (Optional): 5831
Assessment: Appropriate reaction
Comments: RTOG 0
Information: Selecting Yes will display possible errors in your note based on the variables you have selected. This validation is only offered as a suggestion for you. PLEASE NOTE THAT THE VALIDATION TEXT WILL BE REMOVED WHEN YOU FINALIZE YOUR NOTE. IF YOU WANT TO FAX A PRELIMINARY NOTE YOU WILL NEED TO TOGGLE THIS TO 'NO' IF YOU DO NOT WANT IT IN YOUR FAXED NOTE.

## 2021-03-26 ENCOUNTER — APPOINTMENT (RX ONLY)
Dept: URBAN - METROPOLITAN AREA CLINIC 30 | Facility: CLINIC | Age: 86
Setting detail: DERMATOLOGY
End: 2021-03-26

## 2021-03-26 ENCOUNTER — APPOINTMENT (RX ONLY)
Dept: URBAN - METROPOLITAN AREA CLINIC 29 | Facility: CLINIC | Age: 86
Setting detail: DERMATOLOGY
End: 2021-03-26

## 2021-03-26 PROBLEM — C44.722 SQUAMOUS CELL CARCINOMA OF SKIN OF RIGHT LOWER LIMB, INCLUDING HIP: Status: ACTIVE | Noted: 2021-03-26

## 2021-03-26 PROCEDURE — 77427 RADIATION TX MANAGEMENT X5: CPT

## 2021-03-26 PROCEDURE — ? PHE RELATED SUPPLIES PROVIDED/DISPENSED

## 2021-03-26 PROCEDURE — G6001 ECHO GUIDANCE RADIOTHERAPY: HCPCS

## 2021-03-26 PROCEDURE — ? SUPERFICIAL RADIATION TREATMENT

## 2021-03-26 PROCEDURE — ? FOLLOW UP FOR NEXT VISIT

## 2021-03-26 PROCEDURE — ? TREATMENT REGIMEN

## 2021-03-26 PROCEDURE — 77401: CPT

## 2021-03-26 PROCEDURE — 77280 THER RAD SIMULAJ FIELD SMPL: CPT

## 2021-03-26 NOTE — PROCEDURE: TREATMENT REGIMEN
Plan: This patient has been treated today with image guided superficial radiation therapy for non-melanoma\\nskin cancer. Written informed consent has been previously obtained from this patient for this treatment. This\\nconsent is documented in the patient’s chart. The patient gave verbal consent to continue treatment\\ntoday. The patient was treated with a specific radiation dose and setup as prescribed by the provider listed on\\nthis visit note. A Radiation Therapist performed administration of radiation under supervision of\\nprovider. The treatment parameters and cumulative dose are indicated above. Prior to administering the radiation, the patient underwent a verification therapeutic radiology simulation-aided field setting defining relevant normal and abnormal target anatomy and acquiring images with high frequency ultrasound in addition to data necessary developing optimal radiation treatment process for the patient. This process includes verification of the treatment port(s) and proper treatment positioning. All treatment ports were photographed within electronic medical record. The\\npatient’s customized lead blocking along with gross tumor volume and margin was confirmed. Considering superficial radiotherapy is clinical in setup, this requires physician and radiation therapist to clarify location interest being treated against initial images, pathology and patient anatomy. Care was taken ensuring fields treated were geometrically accurate and properly positioned using therapeutic radiology simulation-aided field setting verification per fraction. This\\nprocess is also utilized to determine if any prescription or setup changes are necessary. These steps are\\ntherefore medically necessary ensuring safe and effective administration of radiation. Ongoing therapeutic radiology simulation-aided field setting verification is ordered throughout course of therapy. A high frequency ultrasound image was acquired today for two-dimensional evaluation of the tumor volume and response to treatment, in addition to geometric accuracy of field placement. US depth Is .85 mm, which is .02 mm in difference from previous imaging. The field placement and ultrasound imaging, per fraction, is separate and distinct from the initial simulation, and is an important task in providing safe administration of superficial radiation therapy. Physician evaluation of\\nthe ultrasound tumor depth will be ongoing throughout the course of treatment and is deemed medically necessary in order to ensure the efficacy of treatment and any necessary changes. Today’s image was evaluated for determination of continuation of treatment with the current plan or with necessary changes as appropriate. According to provider review of verification therapeutic radiology simulation-aided field setting and imaging, no change is required. Additionally, the use of ultrasound visualization and targeted assessment allows the patient to be able to see their cancer(s) progress, encouraging patient to complete and maintain compliance through full course of radiotherapy. Per Dr. Gao, continued ultrasound guidance and therapeutic radiology simulation-aided field setting verification per fraction is required for field placement, measurement of tumor depth, progress and acute effect monitoring.\\n\\n
Detail Level: Zone

## 2021-03-26 NOTE — PROCEDURE: TREATMENT REGIMEN
Plan: This patient has been treated today with image guided superficial radiation therapy for non-melanoma\\nskin cancer. Written informed consent has been previously obtained from this patient for this treatment. This\\nconsent is documented in the patient’s chart. The patient gave verbal consent to continue treatment\\ntoday. The patient was treated with a specific radiation dose and setup as prescribed by the provider listed on\\nthis visit note. A Radiation Therapist performed administration of radiation under supervision of\\nprovider. The treatment parameters and cumulative dose are indicated above. Prior to administering the radiation, the patient underwent a verification therapeutic radiology simulation-aided field setting defining relevant normal and abnormal target anatomy and acquiring images with high frequency ultrasound in addition to data necessary developing optimal radiation treatment process for the patient. This process includes verification of the treatment port(s) and proper treatment positioning. All treatment ports were photographed within electronic medical record. The\\npatient’s customized lead blocking along with gross tumor volume and margin was confirmed. Considering superficial radiotherapy is clinical in setup, this requires physician and radiation therapist to clarify location interest being treated against initial images, pathology and patient anatomy. Care was taken ensuring fields treated were geometrically accurate and properly positioned using therapeutic radiology simulation-aided field setting verification per fraction. This\\nprocess is also utilized to determine if any prescription or setup changes are necessary. These steps are\\ntherefore medically necessary ensuring safe and effective administration of radiation. Ongoing therapeutic radiology simulation-aided field setting verification is ordered throughout course of therapy. A high frequency ultrasound image was acquired today for two-dimensional evaluation of the tumor volume and response to treatment, in addition to geometric accuracy of field placement. US depth Is .85 mm, which is .02 mm in difference from previous imaging. The field placement and ultrasound imaging, per fraction, is separate and distinct from the initial simulation, and is an important task in providing safe administration of superficial radiation therapy. Physician evaluation of\\nthe ultrasound tumor depth will be ongoing throughout the course of treatment and is deemed medically necessary in order to ensure the efficacy of treatment and any necessary changes. Today’s image was evaluated for determination of continuation of treatment with the current plan or with necessary changes as appropriate. According to provider review of verification therapeutic radiology simulation-aided field setting and imaging, no change is required. Additionally, the use of ultrasound visualization and targeted assessment allows the patient to be able to see their cancer(s) progress, encouraging patient to complete and maintain compliance through full course of radiotherapy. Per Dr. Mitchell, continued ultrasound guidance and therapeutic radiology simulation-aided field setting verification per fraction is required for field placement, measurement of tumor depth, progress and acute effect monitoring.\\n\\n
Detail Level: Zone

## 2021-03-26 NOTE — PROCEDURE: SUPERFICIAL RADIATION TREATMENT
Fractionation Number (Evaluation): 5
Validate Note Data (See Information Below): Yes
Treatment Time / Fractionation (Optional- Include Units): 0.36 min
Limb Positioning Or Elevation: elevated
Energy (Optional-Please Include Units): 100 KV
Include Rx 3 When Rendering Additional Prescriptions: No
Treatment Device Design After Initial Simulation Justification (Will Render If Bill For Treatment Devices = Yes): The patient is status post radiation simulation and is evaluated as to the use of additional devices for shielding and placement for radiation therapy.
Shielding Size (Optional- Include Units): 3.5 cm x 3.5 cm
Number Of Days Off Treatment: 1
Custom Shielding Preamble Text Will Not Be Included With Simple Simulations (.......... X X Y Cm): A lead shield of 0.762 mm thickness is utilized to form a molded, custom shield with a
Functional Status: 0 (fully active)
Port Dimensions-X Axis In Cm: 3.5
Daily Fractionated Dose (Optional- Include Units): 237.6 cGy
Cumulative Dose In Cgy (Optional): 2613.6
Day Of The Week Treatment Administered: Friday
Depth (Optional-Please Include Units): 2.07 mm
Field Size (Applicator): 4.0 cm
Dimensions-X Axis In Cm: 1.9
Total Number Of Fractions Rx 3: 15
Total Number Of Fractions: 25
Dose Per Fractionation In Cgy (Optional): 237.6
Additional Prescription Justification Text: If there is any interruption in treatment exceeding 5 days please see Decay and Dose Adjustment Calculation and complete treatment under Prescription 2, 3 or 4 as appropriate.
Custom Shielding Afterword Text Will Not Be Included With Simple Simulations (X X Y Cm............): port to correlate with the lesion size, including treatment margin. The custom lead shield is adequate to accommodate the appropriate applicator and provide adequate shielding around the treatment site. Additional shielding (as noted below) is used to protect sensitive, normal tissues.
Computed Treatment Time In Min (Will Render The Same As Calculated Treatment Time If Left Blank): 0.36
Fractions / Week: 3
Assessment: Appropriate reaction
Dimensions-Y Axis In Cm: 1.4
Detail Level: Zone
Total Dose (Optional-Please Include Units): 7932
Comments: RTOG 1
Initial Radiation Treatment Planning (Will Render If Bill Simulation = Yes): The patient had a complete consultation regarding all applicable modalities for the treatment of their skin cancer and based on a variety of factors including the type of tumor, size, and location, the relevant medical history as well as local tissue factors, the functional status of the individual, the ability to perform necessary postoperative wound instructions and the need for simultaneous treatments as well as overall wound healing status, it was determined that the patient would begin radiation therapy treatment for skin cancer.  A full simulation and treatment device design was performed including the determination and formulation of appropriate simple and complex devices including lead shield of 0.762 mm thickness to form molded customized shielding to specifically correlate with the lesion size including treatment margin.  The custom lead shield is adequate to accommodate the appropriate applicator and provide adequate shielding around the treatment site.  The specific field applicator, shields, and devices both simple and complex as well as the specific patient setup is outlined below.  The patient was given a full consent for superficial radiation to both verbally and in writing and the full determination of patient's eligibility for treatment and selection is outlined on the patient eligibility and treatment selection form.  The specific superficial radiotherapy prescription was determined and was documented on the superficial radiotherapy prescription form.  A treatment calculation was also performed and documented on the treatment calculation form.  Based on the prescription, the patient was scheduled for a series of fractional treatments.
Fractionation Number: 11
Treatment Margins In Cm: 0.5
Time Dose Fractionation (Optional- Include Units If Applicable): 98
Information: Selecting Yes will display possible errors in your note based on the variables you have selected. This validation is only offered as a suggestion for you. PLEASE NOTE THAT THE VALIDATION TEXT WILL BE REMOVED WHEN YOU FINALIZE YOUR NOTE. IF YOU WANT TO FAX A PRELIMINARY NOTE YOU WILL NEED TO TOGGLE THIS TO 'NO' IF YOU DO NOT WANT IT IN YOUR FAXED NOTE.
Simple Simulation Preamble Text Will Be Included With Simple Simulations (.......... Indications): Simple simulation was performed today for the following reasons:
Patient Positioning: Sitting

## 2021-03-26 NOTE — PROCEDURE: SUPERFICIAL RADIATION TREATMENT
Detail Level: Zone
Field Number: 1
Dimensions-X Axis In Cm: 1.9
Dimensions-Y Axis In Cm: 1.4
Treatment Margins In Cm: 0.5
Render Additional Prescriptions In Note?: No
Render Patient Eligibility And Selection In Note?: Yes
Functional Status: 0 (fully active)
Port Dimensions-X Axis In Cm: 3.5
Field Size (Applicator): 4.0 cm
Patient Positioning: Sitting
Limb Positioning Or Elevation: elevated
Custom Shielding Preamble Text Will Not Be Included With Simple Simulations (.......... X X Y Cm): A lead shield of 0.762 mm thickness is utilized to form a molded, custom shield with a
Custom Shielding Afterword Text Will Not Be Included With Simple Simulations (X X Y Cm............): port to correlate with the lesion size, including treatment margin. The custom lead shield is adequate to accommodate the appropriate applicator and provide adequate shielding around the treatment site. Additional shielding (as noted below) is used to protect sensitive, normal tissues.
Initial Radiation Treatment Planning (Will Render If Bill Simulation = Yes): The patient had a complete consultation regarding all applicable modalities for the treatment of their skin cancer and based on a variety of factors including the type of tumor, size, and location, the relevant medical history as well as local tissue factors, the functional status of the individual, the ability to perform necessary postoperative wound instructions and the need for simultaneous treatments as well as overall wound healing status, it was determined that the patient would begin radiation therapy treatment for skin cancer.  A full simulation and treatment device design was performed including the determination and formulation of appropriate simple and complex devices including lead shield of 0.762 mm thickness to form molded customized shielding to specifically correlate with the lesion size including treatment margin.  The custom lead shield is adequate to accommodate the appropriate applicator and provide adequate shielding around the treatment site.  The specific field applicator, shields, and devices both simple and complex as well as the specific patient setup is outlined below.  The patient was given a full consent for superficial radiation to both verbally and in writing and the full determination of patient's eligibility for treatment and selection is outlined on the patient eligibility and treatment selection form.  The specific superficial radiotherapy prescription was determined and was documented on the superficial radiotherapy prescription form.  A treatment calculation was also performed and documented on the treatment calculation form.  Based on the prescription, the patient was scheduled for a series of fractional treatments.
Simple Simulation Preamble Text Will Be Included With Simple Simulations (.......... Indications): Simple simulation was performed today for the following reasons:
Treatment Device Design After Initial Simulation Justification (Will Render If Bill For Treatment Devices = Yes): The patient is status post radiation simulation and is evaluated as to the use of additional devices for shielding and placement for radiation therapy.
Additional Prescription Justification Text: If there is any interruption in treatment exceeding 5 days please see Decay and Dose Adjustment Calculation and complete treatment under Prescription 2, 3 or 4 as appropriate.
Shielding Size (Optional- Include Units): 3.5 cm x 3.5 cm
Energy (Optional-Please Include Units): 100 KV
Depth (Optional-Please Include Units): 2.07 mm
Fractions / Week: 3
Time Dose Fractionation (Optional- Include Units If Applicable): 98
Daily Fractionated Dose (Optional- Include Units): 237.6 cGy
Treatment Time / Fractionation (Optional- Include Units): 0.36 min
Total Number Of Fractions: 25
Total Dose (Optional-Please Include Units): 4361
Fractions / Week Rx 2: 5
Total Number Of Fractions Rx 2: 15
Dose Per Fractionation In Cgy (Optional): 237.6
Computed Treatment Time In Min (Will Render The Same As Calculated Treatment Time If Left Blank): 0.36
Day Of The Week Treatment Administered: Friday
Fractionation Number: 11
Cumulative Dose In Cgy (Optional): 2613.6
Assessment: Appropriate reaction
Comments: RTOG 1
Information: Selecting Yes will display possible errors in your note based on the variables you have selected. This validation is only offered as a suggestion for you. PLEASE NOTE THAT THE VALIDATION TEXT WILL BE REMOVED WHEN YOU FINALIZE YOUR NOTE. IF YOU WANT TO FAX A PRELIMINARY NOTE YOU WILL NEED TO TOGGLE THIS TO 'NO' IF YOU DO NOT WANT IT IN YOUR FAXED NOTE.

## 2021-03-30 ENCOUNTER — APPOINTMENT (RX ONLY)
Dept: URBAN - METROPOLITAN AREA CLINIC 29 | Facility: CLINIC | Age: 86
Setting detail: DERMATOLOGY
End: 2021-03-30

## 2021-03-30 ENCOUNTER — APPOINTMENT (RX ONLY)
Dept: URBAN - METROPOLITAN AREA CLINIC 30 | Facility: CLINIC | Age: 86
Setting detail: DERMATOLOGY
End: 2021-03-30

## 2021-03-30 PROBLEM — C44.722 SQUAMOUS CELL CARCINOMA OF SKIN OF RIGHT LOWER LIMB, INCLUDING HIP: Status: ACTIVE | Noted: 2021-03-30

## 2021-03-30 PROCEDURE — ? SUPERFICIAL RADIATION TREATMENT

## 2021-03-30 PROCEDURE — 77401: CPT

## 2021-03-30 PROCEDURE — ? FOLLOW UP FOR NEXT VISIT

## 2021-03-30 PROCEDURE — ? PHE RELATED SUPPLIES PROVIDED/DISPENSED

## 2021-03-30 PROCEDURE — ? TREATMENT REGIMEN

## 2021-03-30 PROCEDURE — G6001 ECHO GUIDANCE RADIOTHERAPY: HCPCS

## 2021-03-30 PROCEDURE — 77280 THER RAD SIMULAJ FIELD SMPL: CPT

## 2021-03-30 NOTE — PROCEDURE: TREATMENT REGIMEN
Plan: This patient has been treated today with image guided superficial radiation therapy for non-melanoma\\nskin cancer. Written informed consent has been previously obtained from this patient for this treatment. This\\nconsent is documented in the patient’s chart. The patient gave verbal consent to continue treatment\\ntoday. The patient was treated with a specific radiation dose and setup as prescribed by the provider listed on\\nthis visit note. A Radiation Therapist performed administration of radiation under supervision of\\nprovider. The treatment parameters and cumulative dose are indicated above. Prior to administering the radiation, the patient underwent a verification therapeutic radiology simulation-aided field setting defining relevant normal and abnormal target anatomy and acquiring images with high frequency ultrasound in addition to data necessary developing optimal radiation treatment process for the patient. This process includes verification of the treatment port(s) and proper treatment positioning. All treatment ports were photographed within electronic medical record. The\\npatient’s customized lead blocking along with gross tumor volume and margin was confirmed. Considering superficial radiotherapy is clinical in setup, this requires physician and radiation therapist to clarify location interest being treated against initial images, pathology and patient anatomy. Care was taken ensuring fields treated were geometrically accurate and properly positioned using therapeutic radiology simulation-aided field setting verification per fraction. This\\nprocess is also utilized to determine if any prescription or setup changes are necessary. These steps are\\ntherefore medically necessary ensuring safe and effective administration of radiation. Ongoing therapeutic radiology simulation-aided field setting verification is ordered throughout course of therapy. A high frequency ultrasound image was acquired today for two-dimensional evaluation of the tumor volume and response to treatment, in addition to geometric accuracy of field placement. US depth Is .83 mm, which is .02 mm in difference from previous imaging. The field placement and ultrasound imaging, per fraction, is separate and distinct from the initial simulation, and is an important task in providing safe administration of superficial radiation therapy. Physician evaluation of\\nthe ultrasound tumor depth will be ongoing throughout the course of treatment and is deemed medically necessary in order to ensure the efficacy of treatment and any necessary changes. Today’s image was evaluated for determination of continuation of treatment with the current plan or with necessary changes as appropriate. According to provider review of verification therapeutic radiology simulation-aided field setting and imaging, no change is required. Additionally, the use of ultrasound visualization and targeted assessment allows the patient to be able to see their cancer(s) progress, encouraging patient to complete and maintain compliance through full course of radiotherapy. Per Dr. Mitchell, continued ultrasound guidance and therapeutic radiology simulation-aided field setting verification per fraction is required for field placement, measurement of tumor depth, progress and acute effect monitoring.\\n\\n
Detail Level: Zone

## 2021-03-30 NOTE — PROCEDURE: SUPERFICIAL RADIATION TREATMENT
Bill For Treatment Devices Only: No
Total Number Of Fractions: 25
Time Dose Fractionation (Optional- Include Units If Applicable): 98
Simple Simulation Preamble Text Will Be Included With Simple Simulations (.......... Indications): Simple simulation was performed today for the following reasons:
Bill For Dosimetry/Render Treatment Time Calculation In Note: Yes
Dimensions-X Axis In Cm: 1.9
Fractions / Week Rx 2: 5
Energy (Optional-Please Include Units): 100 KV
Fractions / Week: 3
Initial Radiation Treatment Planning (Will Render If Bill Simulation = Yes): The patient had a complete consultation regarding all applicable modalities for the treatment of their skin cancer and based on a variety of factors including the type of tumor, size, and location, the relevant medical history as well as local tissue factors, the functional status of the individual, the ability to perform necessary postoperative wound instructions and the need for simultaneous treatments as well as overall wound healing status, it was determined that the patient would begin radiation therapy treatment for skin cancer.  A full simulation and treatment device design was performed including the determination and formulation of appropriate simple and complex devices including lead shield of 0.762 mm thickness to form molded customized shielding to specifically correlate with the lesion size including treatment margin.  The custom lead shield is adequate to accommodate the appropriate applicator and provide adequate shielding around the treatment site.  The specific field applicator, shields, and devices both simple and complex as well as the specific patient setup is outlined below.  The patient was given a full consent for superficial radiation to both verbally and in writing and the full determination of patient's eligibility for treatment and selection is outlined on the patient eligibility and treatment selection form.  The specific superficial radiotherapy prescription was determined and was documented on the superficial radiotherapy prescription form.  A treatment calculation was also performed and documented on the treatment calculation form.  Based on the prescription, the patient was scheduled for a series of fractional treatments.
Field Number: 1
Total Dose (Optional-Please Include Units): 2983
Treatment Time In Min (Optional): 0.36
Shielding Size (Optional- Include Units): 3.5 cm x 3.5 cm
Assessment: Appropriate reaction
Daily Fractionated Dose (Optional- Include Units): 237.6 cGy
Dimensions-Y Axis In Cm: 1.4
Fractionation Number: 12
Detail Level: Zone
Information: Selecting Yes will display possible errors in your note based on the variables you have selected. This validation is only offered as a suggestion for you. PLEASE NOTE THAT THE VALIDATION TEXT WILL BE REMOVED WHEN YOU FINALIZE YOUR NOTE. IF YOU WANT TO FAX A PRELIMINARY NOTE YOU WILL NEED TO TOGGLE THIS TO 'NO' IF YOU DO NOT WANT IT IN YOUR FAXED NOTE.
Patient Positioning: Sitting
Dose Per Fractionation In Cgy (Optional): 237.6
Total Number Of Fractions Rx 4: 15
Field Size (Applicator): 4.0 cm
Custom Shielding Preamble Text Will Not Be Included With Simple Simulations (.......... X X Y Cm): A lead shield of 0.762 mm thickness is utilized to form a molded, custom shield with a
Port Dimensions-X Axis In Cm: 3.5
Functional Status: 0 (fully active)
Treatment Device Design After Initial Simulation Justification (Will Render If Bill For Treatment Devices = Yes): The patient is status post radiation simulation and is evaluated as to the use of additional devices for shielding and placement for radiation therapy.
Treatment Margins In Cm: 0.5
Treatment Time / Fractionation (Optional- Include Units): 0.36 min
Day Of The Week Treatment Administered: Tuesday
Custom Shielding Afterword Text Will Not Be Included With Simple Simulations (X X Y Cm............): port to correlate with the lesion size, including treatment margin. The custom lead shield is adequate to accommodate the appropriate applicator and provide adequate shielding around the treatment site. Additional shielding (as noted below) is used to protect sensitive, normal tissues.
Depth (Optional-Please Include Units): 2.07 mm
Cumulative Dose In Cgy (Optional): 2851.2
Limb Positioning Or Elevation: elevated
Additional Prescription Justification Text: If there is any interruption in treatment exceeding 5 days please see Decay and Dose Adjustment Calculation and complete treatment under Prescription 2, 3 or 4 as appropriate.

## 2021-03-30 NOTE — PROCEDURE: SUPERFICIAL RADIATION TREATMENT
Detail Level: Zone
Field Number: 1
Dimensions-X Axis In Cm: 1.9
Dimensions-Y Axis In Cm: 1.4
Treatment Margins In Cm: 0.5
Render Additional Prescriptions In Note?: No
Render Patient Eligibility And Selection In Note?: Yes
Functional Status: 0 (fully active)
Port Dimensions-X Axis In Cm: 3.5
Field Size (Applicator): 4.0 cm
Patient Positioning: Sitting
Limb Positioning Or Elevation: elevated
Custom Shielding Preamble Text Will Not Be Included With Simple Simulations (.......... X X Y Cm): A lead shield of 0.762 mm thickness is utilized to form a molded, custom shield with a
Custom Shielding Afterword Text Will Not Be Included With Simple Simulations (X X Y Cm............): port to correlate with the lesion size, including treatment margin. The custom lead shield is adequate to accommodate the appropriate applicator and provide adequate shielding around the treatment site. Additional shielding (as noted below) is used to protect sensitive, normal tissues.
Initial Radiation Treatment Planning (Will Render If Bill Simulation = Yes): The patient had a complete consultation regarding all applicable modalities for the treatment of their skin cancer and based on a variety of factors including the type of tumor, size, and location, the relevant medical history as well as local tissue factors, the functional status of the individual, the ability to perform necessary postoperative wound instructions and the need for simultaneous treatments as well as overall wound healing status, it was determined that the patient would begin radiation therapy treatment for skin cancer.  A full simulation and treatment device design was performed including the determination and formulation of appropriate simple and complex devices including lead shield of 0.762 mm thickness to form molded customized shielding to specifically correlate with the lesion size including treatment margin.  The custom lead shield is adequate to accommodate the appropriate applicator and provide adequate shielding around the treatment site.  The specific field applicator, shields, and devices both simple and complex as well as the specific patient setup is outlined below.  The patient was given a full consent for superficial radiation to both verbally and in writing and the full determination of patient's eligibility for treatment and selection is outlined on the patient eligibility and treatment selection form.  The specific superficial radiotherapy prescription was determined and was documented on the superficial radiotherapy prescription form.  A treatment calculation was also performed and documented on the treatment calculation form.  Based on the prescription, the patient was scheduled for a series of fractional treatments.
Simple Simulation Preamble Text Will Be Included With Simple Simulations (.......... Indications): Simple simulation was performed today for the following reasons:
Treatment Device Design After Initial Simulation Justification (Will Render If Bill For Treatment Devices = Yes): The patient is status post radiation simulation and is evaluated as to the use of additional devices for shielding and placement for radiation therapy.
Additional Prescription Justification Text: If there is any interruption in treatment exceeding 5 days please see Decay and Dose Adjustment Calculation and complete treatment under Prescription 2, 3 or 4 as appropriate.
Shielding Size (Optional- Include Units): 3.5 cm x 3.5 cm
Energy (Optional-Please Include Units): 100 KV
Depth (Optional-Please Include Units): 2.07 mm
Fractions / Week: 3
Time Dose Fractionation (Optional- Include Units If Applicable): 98
Daily Fractionated Dose (Optional- Include Units): 237.6 cGy
Treatment Time / Fractionation (Optional- Include Units): 0.36 min
Total Number Of Fractions: 25
Total Dose (Optional-Please Include Units): 1438
Fractions / Week Rx 2: 5
Total Number Of Fractions Rx 2: 15
Dose Per Fractionation In Cgy (Optional): 237.6
Computed Treatment Time In Min (Will Render The Same As Calculated Treatment Time If Left Blank): 0.36
Day Of The Week Treatment Administered: Tuesday
Fractionation Number: 12
Cumulative Dose In Cgy (Optional): 2851.2
Assessment: Appropriate reaction
Information: Selecting Yes will display possible errors in your note based on the variables you have selected. This validation is only offered as a suggestion for you. PLEASE NOTE THAT THE VALIDATION TEXT WILL BE REMOVED WHEN YOU FINALIZE YOUR NOTE. IF YOU WANT TO FAX A PRELIMINARY NOTE YOU WILL NEED TO TOGGLE THIS TO 'NO' IF YOU DO NOT WANT IT IN YOUR FAXED NOTE.

## 2021-03-30 NOTE — PROCEDURE: TREATMENT REGIMEN
Plan: This patient has been treated today with image guided superficial radiation therapy for non-melanoma\\nskin cancer. Written informed consent has been previously obtained from this patient for this treatment. This\\nconsent is documented in the patient’s chart. The patient gave verbal consent to continue treatment\\ntoday. The patient was treated with a specific radiation dose and setup as prescribed by the provider listed on\\nthis visit note. A Radiation Therapist performed administration of radiation under supervision of\\nprovider. The treatment parameters and cumulative dose are indicated above. Prior to administering the radiation, the patient underwent a verification therapeutic radiology simulation-aided field setting defining relevant normal and abnormal target anatomy and acquiring images with high frequency ultrasound in addition to data necessary developing optimal radiation treatment process for the patient. This process includes verification of the treatment port(s) and proper treatment positioning. All treatment ports were photographed within electronic medical record. The\\npatient’s customized lead blocking along with gross tumor volume and margin was confirmed. Considering superficial radiotherapy is clinical in setup, this requires physician and radiation therapist to clarify location interest being treated against initial images, pathology and patient anatomy. Care was taken ensuring fields treated were geometrically accurate and properly positioned using therapeutic radiology simulation-aided field setting verification per fraction. This\\nprocess is also utilized to determine if any prescription or setup changes are necessary. These steps are\\ntherefore medically necessary ensuring safe and effective administration of radiation. Ongoing therapeutic radiology simulation-aided field setting verification is ordered throughout course of therapy. A high frequency ultrasound image was acquired today for two-dimensional evaluation of the tumor volume and response to treatment, in addition to geometric accuracy of field placement. US depth Is .83 mm, which is .02 mm in difference from previous imaging. The field placement and ultrasound imaging, per fraction, is separate and distinct from the initial simulation, and is an important task in providing safe administration of superficial radiation therapy. Physician evaluation of\\nthe ultrasound tumor depth will be ongoing throughout the course of treatment and is deemed medically necessary in order to ensure the efficacy of treatment and any necessary changes. Today’s image was evaluated for determination of continuation of treatment with the current plan or with necessary changes as appropriate. According to provider review of verification therapeutic radiology simulation-aided field setting and imaging, no change is required. Additionally, the use of ultrasound visualization and targeted assessment allows the patient to be able to see their cancer(s) progress, encouraging patient to complete and maintain compliance through full course of radiotherapy. Per Dr. Gao, continued ultrasound guidance and therapeutic radiology simulation-aided field setting verification per fraction is required for field placement, measurement of tumor depth, progress and acute effect monitoring.\\n\\n
Detail Level: Zone

## 2021-03-31 ENCOUNTER — APPOINTMENT (RX ONLY)
Dept: URBAN - METROPOLITAN AREA CLINIC 30 | Facility: CLINIC | Age: 86
Setting detail: DERMATOLOGY
End: 2021-03-31

## 2021-03-31 ENCOUNTER — APPOINTMENT (RX ONLY)
Dept: URBAN - METROPOLITAN AREA CLINIC 29 | Facility: CLINIC | Age: 86
Setting detail: DERMATOLOGY
End: 2021-03-31

## 2021-03-31 PROBLEM — C44.722 SQUAMOUS CELL CARCINOMA OF SKIN OF RIGHT LOWER LIMB, INCLUDING HIP: Status: ACTIVE | Noted: 2021-03-31

## 2021-03-31 PROCEDURE — ? FOLLOW UP FOR NEXT VISIT

## 2021-03-31 PROCEDURE — 77401: CPT

## 2021-03-31 PROCEDURE — 77280 THER RAD SIMULAJ FIELD SMPL: CPT

## 2021-03-31 PROCEDURE — G6001 ECHO GUIDANCE RADIOTHERAPY: HCPCS

## 2021-03-31 PROCEDURE — ? PHE RELATED SUPPLIES PROVIDED/DISPENSED

## 2021-03-31 PROCEDURE — ? TREATMENT REGIMEN

## 2021-03-31 PROCEDURE — ? SUPERFICIAL RADIATION TREATMENT

## 2021-03-31 NOTE — PROCEDURE: SUPERFICIAL RADIATION TREATMENT
Detail Level: Zone
Field Number: 1
Dimensions-X Axis In Cm: 1.9
Dimensions-Y Axis In Cm: 1.4
Treatment Margins In Cm: 0.5
Render Additional Prescriptions In Note?: No
Render Patient Eligibility And Selection In Note?: Yes
Functional Status: 0 (fully active)
Port Dimensions-X Axis In Cm: 3.5
Field Size (Applicator): 4.0 cm
Patient Positioning: Sitting
Limb Positioning Or Elevation: elevated
Custom Shielding Preamble Text Will Not Be Included With Simple Simulations (.......... X X Y Cm): A lead shield of 0.762 mm thickness is utilized to form a molded, custom shield with a
Custom Shielding Afterword Text Will Not Be Included With Simple Simulations (X X Y Cm............): port to correlate with the lesion size, including treatment margin. The custom lead shield is adequate to accommodate the appropriate applicator and provide adequate shielding around the treatment site. Additional shielding (as noted below) is used to protect sensitive, normal tissues.
Initial Radiation Treatment Planning (Will Render If Bill Simulation = Yes): The patient had a complete consultation regarding all applicable modalities for the treatment of their skin cancer and based on a variety of factors including the type of tumor, size, and location, the relevant medical history as well as local tissue factors, the functional status of the individual, the ability to perform necessary postoperative wound instructions and the need for simultaneous treatments as well as overall wound healing status, it was determined that the patient would begin radiation therapy treatment for skin cancer.  A full simulation and treatment device design was performed including the determination and formulation of appropriate simple and complex devices including lead shield of 0.762 mm thickness to form molded customized shielding to specifically correlate with the lesion size including treatment margin.  The custom lead shield is adequate to accommodate the appropriate applicator and provide adequate shielding around the treatment site.  The specific field applicator, shields, and devices both simple and complex as well as the specific patient setup is outlined below.  The patient was given a full consent for superficial radiation to both verbally and in writing and the full determination of patient's eligibility for treatment and selection is outlined on the patient eligibility and treatment selection form.  The specific superficial radiotherapy prescription was determined and was documented on the superficial radiotherapy prescription form.  A treatment calculation was also performed and documented on the treatment calculation form.  Based on the prescription, the patient was scheduled for a series of fractional treatments.
Simple Simulation Preamble Text Will Be Included With Simple Simulations (.......... Indications): Simple simulation was performed today for the following reasons:
Treatment Device Design After Initial Simulation Justification (Will Render If Bill For Treatment Devices = Yes): The patient is status post radiation simulation and is evaluated as to the use of additional devices for shielding and placement for radiation therapy.
Additional Prescription Justification Text: If there is any interruption in treatment exceeding 5 days please see Decay and Dose Adjustment Calculation and complete treatment under Prescription 2, 3 or 4 as appropriate.
Shielding Size (Optional- Include Units): 3.5 cm x 3.5 cm
Energy (Optional-Please Include Units): 100 KV
Depth (Optional-Please Include Units): 2.07 mm
Fractions / Week: 3
Time Dose Fractionation (Optional- Include Units If Applicable): 98
Daily Fractionated Dose (Optional- Include Units): 237.6 cGy
Treatment Time / Fractionation (Optional- Include Units): 0.36 min
Total Number Of Fractions: 25
Total Dose (Optional-Please Include Units): 2779
Fractions / Week Rx 2: 5
Total Number Of Fractions Rx 2: 15
Dose Per Fractionation In Cgy (Optional): 237.6
Computed Treatment Time In Min (Will Render The Same As Calculated Treatment Time If Left Blank): 0.36
Day Of The Week Treatment Administered: Wednesday
Fractionation Number: 13
Cumulative Dose In Cgy (Optional): 3088.8
Assessment: Appropriate reaction
Information: Selecting Yes will display possible errors in your note based on the variables you have selected. This validation is only offered as a suggestion for you. PLEASE NOTE THAT THE VALIDATION TEXT WILL BE REMOVED WHEN YOU FINALIZE YOUR NOTE. IF YOU WANT TO FAX A PRELIMINARY NOTE YOU WILL NEED TO TOGGLE THIS TO 'NO' IF YOU DO NOT WANT IT IN YOUR FAXED NOTE.

## 2021-03-31 NOTE — PROCEDURE: SUPERFICIAL RADIATION TREATMENT
Port Dimensions-X Axis In Cm: 3.5
Functional Status: 0 (fully active)
Energy (Optional-Please Include Units): 100 KV
Custom Shielding Preamble Text Will Not Be Included With Simple Simulations (.......... X X Y Cm): A lead shield of 0.762 mm thickness is utilized to form a molded, custom shield with a
Treatment Time / Fractionation (Optional- Include Units): 0.36 min
Treatment Device Design After Initial Simulation Justification (Will Render If Bill For Treatment Devices = Yes): The patient is status post radiation simulation and is evaluated as to the use of additional devices for shielding and placement for radiation therapy.
Dose / Tx In Cgy (Optional): 237.6
Total Number Of Fractions Rx 3: 15
Treatment Margins In Cm: 0.5
Prescription Used: 1
Fractions / Week Rx 4: 5
Shielding Size (Optional- Include Units): 3.5 cm x 3.5 cm
Dimensions-Y Axis In Cm: 1.4
Daily Fractionated Dose (Optional- Include Units): 237.6 cGy
Treatment Time In Min (Optional): 0.36
Assessment: Appropriate reaction
Cumulative Dose In Cgy (Optional): 3088.8
Custom Shielding Afterword Text Will Not Be Included With Simple Simulations (X X Y Cm............): port to correlate with the lesion size, including treatment margin. The custom lead shield is adequate to accommodate the appropriate applicator and provide adequate shielding around the treatment site. Additional shielding (as noted below) is used to protect sensitive, normal tissues.
Validate Note Data (See Information Below): Yes
Depth (Optional-Please Include Units): 2.07 mm
Total Number Of Fractions: 25
Additional Prescription Justification Text: If there is any interruption in treatment exceeding 5 days please see Decay and Dose Adjustment Calculation and complete treatment under Prescription 2, 3 or 4 as appropriate.
Limb Positioning Or Elevation: elevated
Render Additional Prescriptions In Note?: No
Field Size (Applicator): 4.0 cm
Fractions / Week: 3
Initial Radiation Treatment Planning (Will Render If Bill Simulation = Yes): The patient had a complete consultation regarding all applicable modalities for the treatment of their skin cancer and based on a variety of factors including the type of tumor, size, and location, the relevant medical history as well as local tissue factors, the functional status of the individual, the ability to perform necessary postoperative wound instructions and the need for simultaneous treatments as well as overall wound healing status, it was determined that the patient would begin radiation therapy treatment for skin cancer.  A full simulation and treatment device design was performed including the determination and formulation of appropriate simple and complex devices including lead shield of 0.762 mm thickness to form molded customized shielding to specifically correlate with the lesion size including treatment margin.  The custom lead shield is adequate to accommodate the appropriate applicator and provide adequate shielding around the treatment site.  The specific field applicator, shields, and devices both simple and complex as well as the specific patient setup is outlined below.  The patient was given a full consent for superficial radiation to both verbally and in writing and the full determination of patient's eligibility for treatment and selection is outlined on the patient eligibility and treatment selection form.  The specific superficial radiotherapy prescription was determined and was documented on the superficial radiotherapy prescription form.  A treatment calculation was also performed and documented on the treatment calculation form.  Based on the prescription, the patient was scheduled for a series of fractional treatments.
Total Dose (Optional-Please Include Units): 6853
Day Of The Week Treatment Administered: Wednesday
Fractionation Number: 13
Detail Level: Zone
Information: Selecting Yes will display possible errors in your note based on the variables you have selected. This validation is only offered as a suggestion for you. PLEASE NOTE THAT THE VALIDATION TEXT WILL BE REMOVED WHEN YOU FINALIZE YOUR NOTE. IF YOU WANT TO FAX A PRELIMINARY NOTE YOU WILL NEED TO TOGGLE THIS TO 'NO' IF YOU DO NOT WANT IT IN YOUR FAXED NOTE.
Patient Positioning: Sitting
Dimensions-X Axis In Cm: 1.9
Time Dose Fractionation (Optional- Include Units If Applicable): 98
Simple Simulation Preamble Text Will Be Included With Simple Simulations (.......... Indications): Simple simulation was performed today for the following reasons:

## 2021-03-31 NOTE — PROCEDURE: TREATMENT REGIMEN
Plan: This patient has been treated today with image guided superficial radiation therapy for non-melanoma\\nskin cancer. Written informed consent has been previously obtained from this patient for this treatment. This\\nconsent is documented in the patient’s chart. The patient gave verbal consent to continue treatment\\ntoday. The patient was treated with a specific radiation dose and setup as prescribed by the provider listed on\\nthis visit note. A Radiation Therapist performed administration of radiation under supervision of\\nprovider. The treatment parameters and cumulative dose are indicated above. Prior to administering the radiation, the patient underwent a verification therapeutic radiology simulation-aided field setting defining relevant normal and abnormal target anatomy and acquiring images with high frequency ultrasound in addition to data necessary developing optimal radiation treatment process for the patient. This process includes verification of the treatment port(s) and proper treatment positioning. All treatment ports were photographed within electronic medical record. The\\npatient’s customized lead blocking along with gross tumor volume and margin was confirmed. Considering superficial radiotherapy is clinical in setup, this requires physician and radiation therapist to clarify location interest being treated against initial images, pathology and patient anatomy. Care was taken ensuring fields treated were geometrically accurate and properly positioned using therapeutic radiology simulation-aided field setting verification per fraction. This\\nprocess is also utilized to determine if any prescription or setup changes are necessary. These steps are\\ntherefore medically necessary ensuring safe and effective administration of radiation. Ongoing therapeutic radiology simulation-aided field setting verification is ordered throughout course of therapy. A high frequency ultrasound image was acquired today for two-dimensional evaluation of the tumor volume and response to treatment, in addition to geometric accuracy of field placement. US depth Is .76 mm, which is .07 mm in difference from previous imaging. The field placement and ultrasound imaging, per fraction, is separate and distinct from the initial simulation, and is an important task in providing safe administration of superficial radiation therapy. Physician evaluation of\\nthe ultrasound tumor depth will be ongoing throughout the course of treatment and is deemed medically necessary in order to ensure the efficacy of treatment and any necessary changes. Today’s image was evaluated for determination of continuation of treatment with the current plan or with necessary changes as appropriate. According to provider review of verification therapeutic radiology simulation-aided field setting and imaging, no change is required. Additionally, the use of ultrasound visualization and targeted assessment allows the patient to be able to see their cancer(s) progress, encouraging patient to complete and maintain compliance through full course of radiotherapy. Per Dr. Gao, continued ultrasound guidance and therapeutic radiology simulation-aided field setting verification per fraction is required for field placement, measurement of tumor depth, progress and acute effect monitoring.\\n\\n
Detail Level: Zone

## 2021-03-31 NOTE — PROCEDURE: TREATMENT REGIMEN
Detail Level: Zone
Plan: This patient has been treated today with image guided superficial radiation therapy for non-melanoma\\nskin cancer. Written informed consent has been previously obtained from this patient for this treatment. This\\nconsent is documented in the patient’s chart. The patient gave verbal consent to continue treatment\\ntoday. The patient was treated with a specific radiation dose and setup as prescribed by the provider listed on\\nthis visit note. A Radiation Therapist performed administration of radiation under supervision of\\nprovider. The treatment parameters and cumulative dose are indicated above. Prior to administering the radiation, the patient underwent a verification therapeutic radiology simulation-aided field setting defining relevant normal and abnormal target anatomy and acquiring images with high frequency ultrasound in addition to data necessary developing optimal radiation treatment process for the patient. This process includes verification of the treatment port(s) and proper treatment positioning. All treatment ports were photographed within electronic medical record. The\\npatient’s customized lead blocking along with gross tumor volume and margin was confirmed. Considering superficial radiotherapy is clinical in setup, this requires physician and radiation therapist to clarify location interest being treated against initial images, pathology and patient anatomy. Care was taken ensuring fields treated were geometrically accurate and properly positioned using therapeutic radiology simulation-aided field setting verification per fraction. This\\nprocess is also utilized to determine if any prescription or setup changes are necessary. These steps are\\ntherefore medically necessary ensuring safe and effective administration of radiation. Ongoing therapeutic radiology simulation-aided field setting verification is ordered throughout course of therapy. A high frequency ultrasound image was acquired today for two-dimensional evaluation of the tumor volume and response to treatment, in addition to geometric accuracy of field placement. US depth Is .76 mm, which is .07 mm in difference from previous imaging. The field placement and ultrasound imaging, per fraction, is separate and distinct from the initial simulation, and is an important task in providing safe administration of superficial radiation therapy. Physician evaluation of\\nthe ultrasound tumor depth will be ongoing throughout the course of treatment and is deemed medically necessary in order to ensure the efficacy of treatment and any necessary changes. Today’s image was evaluated for determination of continuation of treatment with the current plan or with necessary changes as appropriate. According to provider review of verification therapeutic radiology simulation-aided field setting and imaging, no change is required. Additionally, the use of ultrasound visualization and targeted assessment allows the patient to be able to see their cancer(s) progress, encouraging patient to complete and maintain compliance through full course of radiotherapy. Per Dr. Mitchell, continued ultrasound guidance and therapeutic radiology simulation-aided field setting verification per fraction is required for field placement, measurement of tumor depth, progress and acute effect monitoring.\\n\\n

## 2021-04-01 ENCOUNTER — APPOINTMENT (RX ONLY)
Dept: URBAN - METROPOLITAN AREA CLINIC 29 | Facility: CLINIC | Age: 86
Setting detail: DERMATOLOGY
End: 2021-04-01

## 2021-04-01 ENCOUNTER — APPOINTMENT (RX ONLY)
Dept: URBAN - METROPOLITAN AREA CLINIC 30 | Facility: CLINIC | Age: 86
Setting detail: DERMATOLOGY
End: 2021-04-01

## 2021-04-01 PROBLEM — C44.722 SQUAMOUS CELL CARCINOMA OF SKIN OF RIGHT LOWER LIMB, INCLUDING HIP: Status: ACTIVE | Noted: 2021-04-01

## 2021-04-01 PROCEDURE — ? SUPERFICIAL RADIATION TREATMENT

## 2021-04-01 PROCEDURE — 77280 THER RAD SIMULAJ FIELD SMPL: CPT

## 2021-04-01 PROCEDURE — ? FOLLOW UP FOR NEXT VISIT

## 2021-04-01 PROCEDURE — ? TREATMENT REGIMEN

## 2021-04-01 PROCEDURE — G6001 ECHO GUIDANCE RADIOTHERAPY: HCPCS

## 2021-04-01 PROCEDURE — ? PHE RELATED SUPPLIES PROVIDED/DISPENSED

## 2021-04-01 PROCEDURE — 77401: CPT

## 2021-04-01 NOTE — PROCEDURE: SUPERFICIAL RADIATION TREATMENT
Port Dimensions-Y Axis In Cm: 3.5
Depth (Optional-Please Include Units): 2.07 mm
Custom Shielding Afterword Text Will Not Be Included With Simple Simulations (X X Y Cm............): port to correlate with the lesion size, including treatment margin. The custom lead shield is adequate to accommodate the appropriate applicator and provide adequate shielding around the treatment site. Additional shielding (as noted below) is used to protect sensitive, normal tissues.
Validate Note Data (See Information Below): Yes
Total Number Of Fractions: 25
Additional Prescription Justification Text: If there is any interruption in treatment exceeding 5 days please see Decay and Dose Adjustment Calculation and complete treatment under Prescription 2, 3 or 4 as appropriate.
Limb Positioning Or Elevation: elevated
Cumulative Dose In Cgy (Optional): 3326.4
Render Additional Prescriptions In Note?: No
Number Of Days Off Treatment: 1
Computed Treatment Time In Min (Will Render The Same As Calculated Treatment Time If Left Blank): 0.36
Field Size (Applicator): 4.0 cm
Functional Status: 0 (fully active)
Energy (Optional-Please Include Units): 100 KV
Custom Shielding Preamble Text Will Not Be Included With Simple Simulations (.......... X X Y Cm): A lead shield of 0.762 mm thickness is utilized to form a molded, custom shield with a
Treatment Time / Fractionation (Optional- Include Units): 0.36 min
Treatment Device Design After Initial Simulation Justification (Will Render If Bill For Treatment Devices = Yes): The patient is status post radiation simulation and is evaluated as to the use of additional devices for shielding and placement for radiation therapy.
Dose / Tx In Cgy (Optional): 237.6
Treatment Margins In Cm: 0.5
Total Number Of Fractions Rx 3: 15
Fractionation Number (Evaluation): 5
Fractions / Week: 3
Initial Radiation Treatment Planning (Will Render If Bill Simulation = Yes): The patient had a complete consultation regarding all applicable modalities for the treatment of their skin cancer and based on a variety of factors including the type of tumor, size, and location, the relevant medical history as well as local tissue factors, the functional status of the individual, the ability to perform necessary postoperative wound instructions and the need for simultaneous treatments as well as overall wound healing status, it was determined that the patient would begin radiation therapy treatment for skin cancer.  A full simulation and treatment device design was performed including the determination and formulation of appropriate simple and complex devices including lead shield of 0.762 mm thickness to form molded customized shielding to specifically correlate with the lesion size including treatment margin.  The custom lead shield is adequate to accommodate the appropriate applicator and provide adequate shielding around the treatment site.  The specific field applicator, shields, and devices both simple and complex as well as the specific patient setup is outlined below.  The patient was given a full consent for superficial radiation to both verbally and in writing and the full determination of patient's eligibility for treatment and selection is outlined on the patient eligibility and treatment selection form.  The specific superficial radiotherapy prescription was determined and was documented on the superficial radiotherapy prescription form.  A treatment calculation was also performed and documented on the treatment calculation form.  Based on the prescription, the patient was scheduled for a series of fractional treatments.
Total Dose (Optional-Please Include Units): 2118
Day Of The Week Treatment Administered: Thursday
Assessment: Appropriate reaction
Information: Selecting Yes will display possible errors in your note based on the variables you have selected. This validation is only offered as a suggestion for you. PLEASE NOTE THAT THE VALIDATION TEXT WILL BE REMOVED WHEN YOU FINALIZE YOUR NOTE. IF YOU WANT TO FAX A PRELIMINARY NOTE YOU WILL NEED TO TOGGLE THIS TO 'NO' IF YOU DO NOT WANT IT IN YOUR FAXED NOTE.
Patient Positioning: Sitting
Dimensions-X Axis In Cm: 1.9
Time Dose Fractionation (Optional- Include Units If Applicable): 98
Simple Simulation Preamble Text Will Be Included With Simple Simulations (.......... Indications): Simple simulation was performed today for the following reasons:
Shielding Size (Optional- Include Units): 3.5 cm x 3.5 cm
Dimensions-Y Axis In Cm: 1.4
Daily Fractionated Dose (Optional- Include Units): 237.6 cGy
Fractionation Number: 14
Detail Level: Zone

## 2021-04-01 NOTE — PROCEDURE: TREATMENT REGIMEN
Detail Level: Zone
Plan: This patient has been treated today with image guided superficial radiation therapy for non-melanoma\\nskin cancer. Written informed consent has been previously obtained from this patient for this treatment. This\\nconsent is documented in the patient’s chart. The patient gave verbal consent to continue treatment\\ntoday. The patient was treated with a specific radiation dose and setup as prescribed by the provider listed on\\nthis visit note. A Radiation Therapist performed administration of radiation under supervision of\\nprovider. The treatment parameters and cumulative dose are indicated above. Prior to administering the radiation, the patient underwent a verification therapeutic radiology simulation-aided field setting defining relevant normal and abnormal target anatomy and acquiring images with high frequency ultrasound in addition to data necessary developing optimal radiation treatment process for the patient. This process includes verification of the treatment port(s) and proper treatment positioning. All treatment ports were photographed within electronic medical record. The\\npatient’s customized lead blocking along with gross tumor volume and margin was confirmed. Considering superficial radiotherapy is clinical in setup, this requires physician and radiation therapist to clarify location interest being treated against initial images, pathology and patient anatomy. Care was taken ensuring fields treated were geometrically accurate and properly positioned using therapeutic radiology simulation-aided field setting verification per fraction. This\\nprocess is also utilized to determine if any prescription or setup changes are necessary. These steps are\\ntherefore medically necessary ensuring safe and effective administration of radiation. Ongoing therapeutic radiology simulation-aided field setting verification is ordered throughout course of therapy. A high frequency ultrasound image was acquired today for two-dimensional evaluation of the tumor volume and response to treatment, in addition to geometric accuracy of field placement. US depth Is .72 mm, which is .04 mm in difference from previous imaging. The field placement and ultrasound imaging, per fraction, is separate and distinct from the initial simulation, and is an important task in providing safe administration of superficial radiation therapy. Physician evaluation of\\nthe ultrasound tumor depth will be ongoing throughout the course of treatment and is deemed medically necessary in order to ensure the efficacy of treatment and any necessary changes. Today’s image was evaluated for determination of continuation of treatment with the current plan or with necessary changes as appropriate. According to provider review of verification therapeutic radiology simulation-aided field setting and imaging, no change is required. Additionally, the use of ultrasound visualization and targeted assessment allows the patient to be able to see their cancer(s) progress, encouraging patient to complete and maintain compliance through full course of radiotherapy. Per Dr. Mitchell, continued ultrasound guidance and therapeutic radiology simulation-aided field setting verification per fraction is required for field placement, measurement of tumor depth, progress and acute effect monitoring.\\n\\n

## 2021-04-01 NOTE — PROCEDURE: TREATMENT REGIMEN
Plan: This patient has been treated today with image guided superficial radiation therapy for non-melanoma\\nskin cancer. Written informed consent has been previously obtained from this patient for this treatment. This\\nconsent is documented in the patient’s chart. The patient gave verbal consent to continue treatment\\ntoday. The patient was treated with a specific radiation dose and setup as prescribed by the provider listed on\\nthis visit note. A Radiation Therapist performed administration of radiation under supervision of\\nprovider. The treatment parameters and cumulative dose are indicated above. Prior to administering the radiation, the patient underwent a verification therapeutic radiology simulation-aided field setting defining relevant normal and abnormal target anatomy and acquiring images with high frequency ultrasound in addition to data necessary developing optimal radiation treatment process for the patient. This process includes verification of the treatment port(s) and proper treatment positioning. All treatment ports were photographed within electronic medical record. The\\npatient’s customized lead blocking along with gross tumor volume and margin was confirmed. Considering superficial radiotherapy is clinical in setup, this requires physician and radiation therapist to clarify location interest being treated against initial images, pathology and patient anatomy. Care was taken ensuring fields treated were geometrically accurate and properly positioned using therapeutic radiology simulation-aided field setting verification per fraction. This\\nprocess is also utilized to determine if any prescription or setup changes are necessary. These steps are\\ntherefore medically necessary ensuring safe and effective administration of radiation. Ongoing therapeutic radiology simulation-aided field setting verification is ordered throughout course of therapy. A high frequency ultrasound image was acquired today for two-dimensional evaluation of the tumor volume and response to treatment, in addition to geometric accuracy of field placement. US depth Is .72 mm, which is .04 mm in difference from previous imaging. The field placement and ultrasound imaging, per fraction, is separate and distinct from the initial simulation, and is an important task in providing safe administration of superficial radiation therapy. Physician evaluation of\\nthe ultrasound tumor depth will be ongoing throughout the course of treatment and is deemed medically necessary in order to ensure the efficacy of treatment and any necessary changes. Today’s image was evaluated for determination of continuation of treatment with the current plan or with necessary changes as appropriate. According to provider review of verification therapeutic radiology simulation-aided field setting and imaging, no change is required. Additionally, the use of ultrasound visualization and targeted assessment allows the patient to be able to see their cancer(s) progress, encouraging patient to complete and maintain compliance through full course of radiotherapy. Per Dr. Gao, continued ultrasound guidance and therapeutic radiology simulation-aided field setting verification per fraction is required for field placement, measurement of tumor depth, progress and acute effect monitoring.\\n\\n
Detail Level: Zone

## 2021-04-01 NOTE — PROCEDURE: SUPERFICIAL RADIATION TREATMENT
Detail Level: Zone
Field Number: 1
Dimensions-X Axis In Cm: 1.9
Dimensions-Y Axis In Cm: 1.4
Treatment Margins In Cm: 0.5
Render Additional Prescriptions In Note?: No
Render Patient Eligibility And Selection In Note?: Yes
Functional Status: 0 (fully active)
Port Dimensions-X Axis In Cm: 3.5
Field Size (Applicator): 4.0 cm
Patient Positioning: Sitting
Limb Positioning Or Elevation: elevated
Custom Shielding Preamble Text Will Not Be Included With Simple Simulations (.......... X X Y Cm): A lead shield of 0.762 mm thickness is utilized to form a molded, custom shield with a
Custom Shielding Afterword Text Will Not Be Included With Simple Simulations (X X Y Cm............): port to correlate with the lesion size, including treatment margin. The custom lead shield is adequate to accommodate the appropriate applicator and provide adequate shielding around the treatment site. Additional shielding (as noted below) is used to protect sensitive, normal tissues.
Initial Radiation Treatment Planning (Will Render If Bill Simulation = Yes): The patient had a complete consultation regarding all applicable modalities for the treatment of their skin cancer and based on a variety of factors including the type of tumor, size, and location, the relevant medical history as well as local tissue factors, the functional status of the individual, the ability to perform necessary postoperative wound instructions and the need for simultaneous treatments as well as overall wound healing status, it was determined that the patient would begin radiation therapy treatment for skin cancer.  A full simulation and treatment device design was performed including the determination and formulation of appropriate simple and complex devices including lead shield of 0.762 mm thickness to form molded customized shielding to specifically correlate with the lesion size including treatment margin.  The custom lead shield is adequate to accommodate the appropriate applicator and provide adequate shielding around the treatment site.  The specific field applicator, shields, and devices both simple and complex as well as the specific patient setup is outlined below.  The patient was given a full consent for superficial radiation to both verbally and in writing and the full determination of patient's eligibility for treatment and selection is outlined on the patient eligibility and treatment selection form.  The specific superficial radiotherapy prescription was determined and was documented on the superficial radiotherapy prescription form.  A treatment calculation was also performed and documented on the treatment calculation form.  Based on the prescription, the patient was scheduled for a series of fractional treatments.
Simple Simulation Preamble Text Will Be Included With Simple Simulations (.......... Indications): Simple simulation was performed today for the following reasons:
Treatment Device Design After Initial Simulation Justification (Will Render If Bill For Treatment Devices = Yes): The patient is status post radiation simulation and is evaluated as to the use of additional devices for shielding and placement for radiation therapy.
Additional Prescription Justification Text: If there is any interruption in treatment exceeding 5 days please see Decay and Dose Adjustment Calculation and complete treatment under Prescription 2, 3 or 4 as appropriate.
Shielding Size (Optional- Include Units): 3.5 cm x 3.5 cm
Energy (Optional-Please Include Units): 100 KV
Depth (Optional-Please Include Units): 2.07 mm
Fractions / Week: 3
Time Dose Fractionation (Optional- Include Units If Applicable): 98
Daily Fractionated Dose (Optional- Include Units): 237.6 cGy
Treatment Time / Fractionation (Optional- Include Units): 0.36 min
Total Number Of Fractions: 25
Total Dose (Optional-Please Include Units): 1841
Fractions / Week Rx 2: 5
Total Number Of Fractions Rx 2: 15
Dose Per Fractionation In Cgy (Optional): 237.6
Computed Treatment Time In Min (Will Render The Same As Calculated Treatment Time If Left Blank): 0.36
Day Of The Week Treatment Administered: Thursday
Fractionation Number: 14
Cumulative Dose In Cgy (Optional): 3326.4
Assessment: Appropriate reaction
Information: Selecting Yes will display possible errors in your note based on the variables you have selected. This validation is only offered as a suggestion for you. PLEASE NOTE THAT THE VALIDATION TEXT WILL BE REMOVED WHEN YOU FINALIZE YOUR NOTE. IF YOU WANT TO FAX A PRELIMINARY NOTE YOU WILL NEED TO TOGGLE THIS TO 'NO' IF YOU DO NOT WANT IT IN YOUR FAXED NOTE.

## 2021-04-07 ENCOUNTER — APPOINTMENT (RX ONLY)
Dept: URBAN - METROPOLITAN AREA CLINIC 30 | Facility: CLINIC | Age: 86
Setting detail: DERMATOLOGY
End: 2021-04-07

## 2021-04-07 ENCOUNTER — APPOINTMENT (RX ONLY)
Dept: URBAN - METROPOLITAN AREA CLINIC 29 | Facility: CLINIC | Age: 86
Setting detail: DERMATOLOGY
End: 2021-04-07

## 2021-04-07 PROBLEM — C44.722 SQUAMOUS CELL CARCINOMA OF SKIN OF RIGHT LOWER LIMB, INCLUDING HIP: Status: ACTIVE | Noted: 2021-04-07

## 2021-04-07 PROCEDURE — ? PHE RELATED SUPPLIES PROVIDED/DISPENSED

## 2021-04-07 PROCEDURE — 77401: CPT

## 2021-04-07 PROCEDURE — ? FOLLOW UP FOR NEXT VISIT

## 2021-04-07 PROCEDURE — ? TREATMENT REGIMEN

## 2021-04-07 PROCEDURE — 77280 THER RAD SIMULAJ FIELD SMPL: CPT

## 2021-04-07 PROCEDURE — ? SUPERFICIAL RADIATION TREATMENT

## 2021-04-07 PROCEDURE — G6001 ECHO GUIDANCE RADIOTHERAPY: HCPCS

## 2021-04-07 NOTE — PROCEDURE: SUPERFICIAL RADIATION TREATMENT
Detail Level: Zone
Field Number: 1
Dimensions-X Axis In Cm: 1.9
Dimensions-Y Axis In Cm: 1.4
Treatment Margins In Cm: 0.5
Render Additional Prescriptions In Note?: No
Render Patient Eligibility And Selection In Note?: Yes
Functional Status: 0 (fully active)
Port Dimensions-X Axis In Cm: 3.5
Field Size (Applicator): 4.0 cm
Patient Positioning: Sitting
Limb Positioning Or Elevation: elevated
Custom Shielding Preamble Text Will Not Be Included With Simple Simulations (.......... X X Y Cm): A lead shield of 0.762 mm thickness is utilized to form a molded, custom shield with a
Custom Shielding Afterword Text Will Not Be Included With Simple Simulations (X X Y Cm............): port to correlate with the lesion size, including treatment margin. The custom lead shield is adequate to accommodate the appropriate applicator and provide adequate shielding around the treatment site. Additional shielding (as noted below) is used to protect sensitive, normal tissues.
Initial Radiation Treatment Planning (Will Render If Bill Simulation = Yes): The patient had a complete consultation regarding all applicable modalities for the treatment of their skin cancer and based on a variety of factors including the type of tumor, size, and location, the relevant medical history as well as local tissue factors, the functional status of the individual, the ability to perform necessary postoperative wound instructions and the need for simultaneous treatments as well as overall wound healing status, it was determined that the patient would begin radiation therapy treatment for skin cancer.  A full simulation and treatment device design was performed including the determination and formulation of appropriate simple and complex devices including lead shield of 0.762 mm thickness to form molded customized shielding to specifically correlate with the lesion size including treatment margin.  The custom lead shield is adequate to accommodate the appropriate applicator and provide adequate shielding around the treatment site.  The specific field applicator, shields, and devices both simple and complex as well as the specific patient setup is outlined below.  The patient was given a full consent for superficial radiation to both verbally and in writing and the full determination of patient's eligibility for treatment and selection is outlined on the patient eligibility and treatment selection form.  The specific superficial radiotherapy prescription was determined and was documented on the superficial radiotherapy prescription form.  A treatment calculation was also performed and documented on the treatment calculation form.  Based on the prescription, the patient was scheduled for a series of fractional treatments.
Simple Simulation Preamble Text Will Be Included With Simple Simulations (.......... Indications): Simple simulation was performed today for the following reasons:
Treatment Device Design After Initial Simulation Justification (Will Render If Bill For Treatment Devices = Yes): The patient is status post radiation simulation and is evaluated as to the use of additional devices for shielding and placement for radiation therapy.
Additional Prescription Justification Text: If there is any interruption in treatment exceeding 5 days please see Decay and Dose Adjustment Calculation and complete treatment under Prescription 2, 3 or 4 as appropriate.
Shielding Size (Optional- Include Units): 3.5 cm x 3.5 cm
Energy (Optional-Please Include Units): 100 KV
Depth (Optional-Please Include Units): 2.07 mm
Fractions / Week: 3
Time Dose Fractionation (Optional- Include Units If Applicable): 98
Daily Fractionated Dose (Optional- Include Units): 237.6 cGy
Treatment Time / Fractionation (Optional- Include Units): 0.36 min
Total Number Of Fractions: 25
Total Dose (Optional-Please Include Units): 3627
Fractions / Week Rx 2: 5
Total Number Of Fractions Rx 2: 15
Dose Per Fractionation In Cgy (Optional): 237.6
Computed Treatment Time In Min (Will Render The Same As Calculated Treatment Time If Left Blank): 0.36
Day Of The Week Treatment Administered: Wednesday
Cumulative Dose In Cgy (Optional): 3560
Assessment: Appropriate reaction
Information: Selecting Yes will display possible errors in your note based on the variables you have selected. This validation is only offered as a suggestion for you. PLEASE NOTE THAT THE VALIDATION TEXT WILL BE REMOVED WHEN YOU FINALIZE YOUR NOTE. IF YOU WANT TO FAX A PRELIMINARY NOTE YOU WILL NEED TO TOGGLE THIS TO 'NO' IF YOU DO NOT WANT IT IN YOUR FAXED NOTE.

## 2021-04-07 NOTE — PROCEDURE: TREATMENT REGIMEN
Plan: This patient has been treated today with image guided superficial radiation therapy for non-melanoma\\nskin cancer. Written informed consent has been previously obtained from this patient for this treatment. This\\nconsent is documented in the patient’s chart. The patient gave verbal consent to continue treatment\\ntoday. The patient was treated with a specific radiation dose and setup as prescribed by the provider listed on\\nthis visit note. A Radiation Therapist performed administration of radiation under supervision of\\nprovider. The treatment parameters and cumulative dose are indicated above. Prior to administering the radiation, the patient underwent a verification therapeutic radiology simulation-aided field setting defining relevant normal and abnormal target anatomy and acquiring images with high frequency ultrasound in addition to data necessary developing optimal radiation treatment process for the patient. This process includes verification of the treatment port(s) and proper treatment positioning. All treatment ports were photographed within electronic medical record. The\\npatient’s customized lead blocking along with gross tumor volume and margin was confirmed. Considering superficial radiotherapy is clinical in setup, this requires physician and radiation therapist to clarify location interest being treated against initial images, pathology and patient anatomy. Care was taken ensuring fields treated were geometrically accurate and properly positioned using therapeutic radiology simulation-aided field setting verification per fraction. This\\nprocess is also utilized to determine if any prescription or setup changes are necessary. These steps are\\ntherefore medically necessary ensuring safe and effective administration of radiation. Ongoing therapeutic radiology simulation-aided field setting verification is ordered throughout course of therapy. A high frequency ultrasound image was acquired today for two-dimensional evaluation of the tumor volume and response to treatment, in addition to geometric accuracy of field placement. US depth Is .67 mm, which is .05 mm in difference from previous imaging. The field placement and ultrasound imaging, per fraction, is separate and distinct from the initial simulation, and is an important task in providing safe administration of superficial radiation therapy. Physician evaluation of\\nthe ultrasound tumor depth will be ongoing throughout the course of treatment and is deemed medically necessary in order to ensure the efficacy of treatment and any necessary changes. Today’s image was evaluated for determination of continuation of treatment with the current plan or with necessary changes as appropriate. According to provider review of verification therapeutic radiology simulation-aided field setting and imaging, no change is required. Additionally, the use of ultrasound visualization and targeted assessment allows the patient to be able to see their cancer(s) progress, encouraging patient to complete and maintain compliance through full course of radiotherapy. Per Dr. Gao, continued ultrasound guidance and therapeutic radiology simulation-aided field setting verification per fraction is required for field placement, measurement of tumor depth, progress and acute effect monitoring.\\n\\n
Detail Level: Zone

## 2021-04-07 NOTE — PROCEDURE: SUPERFICIAL RADIATION TREATMENT
Fractionation Number (Evaluation): 5
Initial Radiation Treatment Planning (Will Render If Bill Simulation = Yes): The patient had a complete consultation regarding all applicable modalities for the treatment of their skin cancer and based on a variety of factors including the type of tumor, size, and location, the relevant medical history as well as local tissue factors, the functional status of the individual, the ability to perform necessary postoperative wound instructions and the need for simultaneous treatments as well as overall wound healing status, it was determined that the patient would begin radiation therapy treatment for skin cancer.  A full simulation and treatment device design was performed including the determination and formulation of appropriate simple and complex devices including lead shield of 0.762 mm thickness to form molded customized shielding to specifically correlate with the lesion size including treatment margin.  The custom lead shield is adequate to accommodate the appropriate applicator and provide adequate shielding around the treatment site.  The specific field applicator, shields, and devices both simple and complex as well as the specific patient setup is outlined below.  The patient was given a full consent for superficial radiation to both verbally and in writing and the full determination of patient's eligibility for treatment and selection is outlined on the patient eligibility and treatment selection form.  The specific superficial radiotherapy prescription was determined and was documented on the superficial radiotherapy prescription form.  A treatment calculation was also performed and documented on the treatment calculation form.  Based on the prescription, the patient was scheduled for a series of fractional treatments.
Field Size (Applicator): 4.0 cm
Include Rx 4 When Rendering Additional Prescriptions: No
Total Dose (Optional-Please Include Units): 3856
Total Number Of Fractions Rx 2: 15
Day Of The Week Treatment Administered: Wednesday
Assessment: Appropriate reaction
Cumulative Dose In Cgy (Optional): 356
Depth (Optional-Please Include Units): 2.07 mm
Custom Shielding Afterword Text Will Not Be Included With Simple Simulations (X X Y Cm............): port to correlate with the lesion size, including treatment margin. The custom lead shield is adequate to accommodate the appropriate applicator and provide adequate shielding around the treatment site. Additional shielding (as noted below) is used to protect sensitive, normal tissues.
Validate Note Data (See Information Below): Yes
Port Dimensions-Y Axis In Cm: 3.5
Total Number Of Fractions: 25
Additional Prescription Justification Text: If there is any interruption in treatment exceeding 5 days please see Decay and Dose Adjustment Calculation and complete treatment under Prescription 2, 3 or 4 as appropriate.
Limb Positioning Or Elevation: elevated
Dimensions-X Axis In Cm: 1.9
Time Dose Fractionation (Optional- Include Units If Applicable): 98
Simple Simulation Preamble Text Will Be Included With Simple Simulations (.......... Indications): Simple simulation was performed today for the following reasons:
Energy (Optional-Please Include Units): 100 KV
Field Number: 1
Fractions / Week: 3
Shielding Size (Optional- Include Units): 3.5 cm x 3.5 cm
Dimensions-Y Axis In Cm: 1.4
Daily Fractionated Dose (Optional- Include Units): 237.6 cGy
Treatment Time In Min (Optional): 0.36
Detail Level: Zone
Information: Selecting Yes will display possible errors in your note based on the variables you have selected. This validation is only offered as a suggestion for you. PLEASE NOTE THAT THE VALIDATION TEXT WILL BE REMOVED WHEN YOU FINALIZE YOUR NOTE. IF YOU WANT TO FAX A PRELIMINARY NOTE YOU WILL NEED TO TOGGLE THIS TO 'NO' IF YOU DO NOT WANT IT IN YOUR FAXED NOTE.
Dose Per Fractionation In Cgy (Optional): 237.6
Patient Positioning: Sitting
Functional Status: 0 (fully active)
Custom Shielding Preamble Text Will Not Be Included With Simple Simulations (.......... X X Y Cm): A lead shield of 0.762 mm thickness is utilized to form a molded, custom shield with a
Treatment Time / Fractionation (Optional- Include Units): 0.36 min
Treatment Device Design After Initial Simulation Justification (Will Render If Bill For Treatment Devices = Yes): The patient is status post radiation simulation and is evaluated as to the use of additional devices for shielding and placement for radiation therapy.
Treatment Margins In Cm: 0.5

## 2021-04-07 NOTE — PROCEDURE: TREATMENT REGIMEN
Detail Level: Zone
Plan: This patient has been treated today with image guided superficial radiation therapy for non-melanoma\\nskin cancer. Written informed consent has been previously obtained from this patient for this treatment. This\\nconsent is documented in the patient’s chart. The patient gave verbal consent to continue treatment\\ntoday. The patient was treated with a specific radiation dose and setup as prescribed by the provider listed on\\nthis visit note. A Radiation Therapist performed administration of radiation under supervision of\\nprovider. The treatment parameters and cumulative dose are indicated above. Prior to administering the radiation, the patient underwent a verification therapeutic radiology simulation-aided field setting defining relevant normal and abnormal target anatomy and acquiring images with high frequency ultrasound in addition to data necessary developing optimal radiation treatment process for the patient. This process includes verification of the treatment port(s) and proper treatment positioning. All treatment ports were photographed within electronic medical record. The\\npatient’s customized lead blocking along with gross tumor volume and margin was confirmed. Considering superficial radiotherapy is clinical in setup, this requires physician and radiation therapist to clarify location interest being treated against initial images, pathology and patient anatomy. Care was taken ensuring fields treated were geometrically accurate and properly positioned using therapeutic radiology simulation-aided field setting verification per fraction. This\\nprocess is also utilized to determine if any prescription or setup changes are necessary. These steps are\\ntherefore medically necessary ensuring safe and effective administration of radiation. Ongoing therapeutic radiology simulation-aided field setting verification is ordered throughout course of therapy. A high frequency ultrasound image was acquired today for two-dimensional evaluation of the tumor volume and response to treatment, in addition to geometric accuracy of field placement. US depth Is .67 mm, which is .05 mm in difference from previous imaging. The field placement and ultrasound imaging, per fraction, is separate and distinct from the initial simulation, and is an important task in providing safe administration of superficial radiation therapy. Physician evaluation of\\nthe ultrasound tumor depth will be ongoing throughout the course of treatment and is deemed medically necessary in order to ensure the efficacy of treatment and any necessary changes. Today’s image was evaluated for determination of continuation of treatment with the current plan or with necessary changes as appropriate. According to provider review of verification therapeutic radiology simulation-aided field setting and imaging, no change is required. Additionally, the use of ultrasound visualization and targeted assessment allows the patient to be able to see their cancer(s) progress, encouraging patient to complete and maintain compliance through full course of radiotherapy. Per Dr. Mitchell, continued ultrasound guidance and therapeutic radiology simulation-aided field setting verification per fraction is required for field placement, measurement of tumor depth, progress and acute effect monitoring.\\n\\n

## 2021-04-08 ENCOUNTER — APPOINTMENT (RX ONLY)
Dept: URBAN - METROPOLITAN AREA CLINIC 29 | Facility: CLINIC | Age: 86
Setting detail: DERMATOLOGY
End: 2021-04-08

## 2021-04-08 ENCOUNTER — APPOINTMENT (RX ONLY)
Dept: URBAN - METROPOLITAN AREA CLINIC 30 | Facility: CLINIC | Age: 86
Setting detail: DERMATOLOGY
End: 2021-04-08

## 2021-04-08 PROBLEM — C44.722 SQUAMOUS CELL CARCINOMA OF SKIN OF RIGHT LOWER LIMB, INCLUDING HIP: Status: ACTIVE | Noted: 2021-04-08

## 2021-04-08 PROCEDURE — ? SUPERFICIAL RADIATION TREATMENT

## 2021-04-08 PROCEDURE — ? FOLLOW UP FOR NEXT VISIT

## 2021-04-08 PROCEDURE — 77401: CPT

## 2021-04-08 PROCEDURE — 77280 THER RAD SIMULAJ FIELD SMPL: CPT

## 2021-04-08 PROCEDURE — G6001 ECHO GUIDANCE RADIOTHERAPY: HCPCS

## 2021-04-08 PROCEDURE — ? PHE RELATED SUPPLIES PROVIDED/DISPENSED

## 2021-04-08 PROCEDURE — ? TREATMENT REGIMEN

## 2021-04-08 NOTE — PROCEDURE: SUPERFICIAL RADIATION TREATMENT
Detail Level: Zone
Field Number: 1
Dimensions-X Axis In Cm: 1.9
Dimensions-Y Axis In Cm: 1.4
Treatment Margins In Cm: 0.5
Render Additional Prescriptions In Note?: No
Render Patient Eligibility And Selection In Note?: Yes
Functional Status: 0 (fully active)
Port Dimensions-X Axis In Cm: 3.5
Field Size (Applicator): 4.0 cm
Patient Positioning: Sitting
Limb Positioning Or Elevation: elevated
Custom Shielding Preamble Text Will Not Be Included With Simple Simulations (.......... X X Y Cm): A lead shield of 0.762 mm thickness is utilized to form a molded, custom shield with a
Custom Shielding Afterword Text Will Not Be Included With Simple Simulations (X X Y Cm............): port to correlate with the lesion size, including treatment margin. The custom lead shield is adequate to accommodate the appropriate applicator and provide adequate shielding around the treatment site. Additional shielding (as noted below) is used to protect sensitive, normal tissues.
Initial Radiation Treatment Planning (Will Render If Bill Simulation = Yes): The patient had a complete consultation regarding all applicable modalities for the treatment of their skin cancer and based on a variety of factors including the type of tumor, size, and location, the relevant medical history as well as local tissue factors, the functional status of the individual, the ability to perform necessary postoperative wound instructions and the need for simultaneous treatments as well as overall wound healing status, it was determined that the patient would begin radiation therapy treatment for skin cancer.  A full simulation and treatment device design was performed including the determination and formulation of appropriate simple and complex devices including lead shield of 0.762 mm thickness to form molded customized shielding to specifically correlate with the lesion size including treatment margin.  The custom lead shield is adequate to accommodate the appropriate applicator and provide adequate shielding around the treatment site.  The specific field applicator, shields, and devices both simple and complex as well as the specific patient setup is outlined below.  The patient was given a full consent for superficial radiation to both verbally and in writing and the full determination of patient's eligibility for treatment and selection is outlined on the patient eligibility and treatment selection form.  The specific superficial radiotherapy prescription was determined and was documented on the superficial radiotherapy prescription form.  A treatment calculation was also performed and documented on the treatment calculation form.  Based on the prescription, the patient was scheduled for a series of fractional treatments.
Simple Simulation Preamble Text Will Be Included With Simple Simulations (.......... Indications): Simple simulation was performed today for the following reasons:
Treatment Device Design After Initial Simulation Justification (Will Render If Bill For Treatment Devices = Yes): The patient is status post radiation simulation and is evaluated as to the use of additional devices for shielding and placement for radiation therapy.
Additional Prescription Justification Text: If there is any interruption in treatment exceeding 5 days please see Decay and Dose Adjustment Calculation and complete treatment under Prescription 2, 3 or 4 as appropriate.
Shielding Size (Optional- Include Units): 3.5 cm x 3.5 cm
Energy (Optional-Please Include Units): 100 KV
Depth (Optional-Please Include Units): 2.07 mm
Fractions / Week: 3
Time Dose Fractionation (Optional- Include Units If Applicable): 98
Daily Fractionated Dose (Optional- Include Units): 237.6 cGy
Treatment Time / Fractionation (Optional- Include Units): 0.36 min
Total Number Of Fractions: 25
Total Dose (Optional-Please Include Units): 5429
Fractions / Week Rx 2: 5
Total Number Of Fractions Rx 2: 15
Dose Per Fractionation In Cgy (Optional): 237.6
Computed Treatment Time In Min (Will Render The Same As Calculated Treatment Time If Left Blank): 0.36
Day Of The Week Treatment Administered: Thursday
Fractionation Number: 16
Cumulative Dose In Cgy (Optional): 3801.6
Assessment: Appropriate reaction
Information: Selecting Yes will display possible errors in your note based on the variables you have selected. This validation is only offered as a suggestion for you. PLEASE NOTE THAT THE VALIDATION TEXT WILL BE REMOVED WHEN YOU FINALIZE YOUR NOTE. IF YOU WANT TO FAX A PRELIMINARY NOTE YOU WILL NEED TO TOGGLE THIS TO 'NO' IF YOU DO NOT WANT IT IN YOUR FAXED NOTE.

## 2021-04-08 NOTE — PROCEDURE: TREATMENT REGIMEN
Plan: This patient has been treated today with image guided superficial radiation therapy for non-melanoma\\nskin cancer. Written informed consent has been previously obtained from this patient for this treatment. This\\nconsent is documented in the patient’s chart. The patient gave verbal consent to continue treatment\\ntoday. The patient was treated with a specific radiation dose and setup as prescribed by the provider listed on\\nthis visit note. A Radiation Therapist performed administration of radiation under supervision of\\nprovider. The treatment parameters and cumulative dose are indicated above. Prior to administering the radiation, the patient underwent a verification therapeutic radiology simulation-aided field setting defining relevant normal and abnormal target anatomy and acquiring images with high frequency ultrasound in addition to data necessary developing optimal radiation treatment process for the patient. This process includes verification of the treatment port(s) and proper treatment positioning. All treatment ports were photographed within electronic medical record. The\\npatient’s customized lead blocking along with gross tumor volume and margin was confirmed. Considering superficial radiotherapy is clinical in setup, this requires physician and radiation therapist to clarify location interest being treated against initial images, pathology and patient anatomy. Care was taken ensuring fields treated were geometrically accurate and properly positioned using therapeutic radiology simulation-aided field setting verification per fraction. This\\nprocess is also utilized to determine if any prescription or setup changes are necessary. These steps are\\ntherefore medically necessary ensuring safe and effective administration of radiation. Ongoing therapeutic radiology simulation-aided field setting verification is ordered throughout course of therapy. A high frequency ultrasound image was acquired today for two-dimensional evaluation of the tumor volume and response to treatment, in addition to geometric accuracy of field placement. US depth Is .64 mm, which is .03 mm in difference from previous imaging. The field placement and ultrasound imaging, per fraction, is separate and distinct from the initial simulation, and is an important task in providing safe administration of superficial radiation therapy. Physician evaluation of\\nthe ultrasound tumor depth will be ongoing throughout the course of treatment and is deemed medically necessary in order to ensure the efficacy of treatment and any necessary changes. Today’s image was evaluated for determination of continuation of treatment with the current plan or with necessary changes as appropriate. According to provider review of verification therapeutic radiology simulation-aided field setting and imaging, no change is required. Additionally, the use of ultrasound visualization and targeted assessment allows the patient to be able to see their cancer(s) progress, encouraging patient to complete and maintain compliance through full course of radiotherapy. Per Dr. Mitchell, continued ultrasound guidance and therapeutic radiology simulation-aided field setting verification per fraction is required for field placement, measurement of tumor depth, progress and acute effect monitoring.\\n\\n
Detail Level: Zone

## 2021-04-08 NOTE — PROCEDURE: TREATMENT REGIMEN
Plan: This patient has been treated today with image guided superficial radiation therapy for non-melanoma\\nskin cancer. Written informed consent has been previously obtained from this patient for this treatment. This\\nconsent is documented in the patient’s chart. The patient gave verbal consent to continue treatment\\ntoday. The patient was treated with a specific radiation dose and setup as prescribed by the provider listed on\\nthis visit note. A Radiation Therapist performed administration of radiation under supervision of\\nprovider. The treatment parameters and cumulative dose are indicated above. Prior to administering the radiation, the patient underwent a verification therapeutic radiology simulation-aided field setting defining relevant normal and abnormal target anatomy and acquiring images with high frequency ultrasound in addition to data necessary developing optimal radiation treatment process for the patient. This process includes verification of the treatment port(s) and proper treatment positioning. All treatment ports were photographed within electronic medical record. The\\npatient’s customized lead blocking along with gross tumor volume and margin was confirmed. Considering superficial radiotherapy is clinical in setup, this requires physician and radiation therapist to clarify location interest being treated against initial images, pathology and patient anatomy. Care was taken ensuring fields treated were geometrically accurate and properly positioned using therapeutic radiology simulation-aided field setting verification per fraction. This\\nprocess is also utilized to determine if any prescription or setup changes are necessary. These steps are\\ntherefore medically necessary ensuring safe and effective administration of radiation. Ongoing therapeutic radiology simulation-aided field setting verification is ordered throughout course of therapy. A high frequency ultrasound image was acquired today for two-dimensional evaluation of the tumor volume and response to treatment, in addition to geometric accuracy of field placement. US depth Is .64 mm, which is .03 mm in difference from previous imaging. The field placement and ultrasound imaging, per fraction, is separate and distinct from the initial simulation, and is an important task in providing safe administration of superficial radiation therapy. Physician evaluation of\\nthe ultrasound tumor depth will be ongoing throughout the course of treatment and is deemed medically necessary in order to ensure the efficacy of treatment and any necessary changes. Today’s image was evaluated for determination of continuation of treatment with the current plan or with necessary changes as appropriate. According to provider review of verification therapeutic radiology simulation-aided field setting and imaging, no change is required. Additionally, the use of ultrasound visualization and targeted assessment allows the patient to be able to see their cancer(s) progress, encouraging patient to complete and maintain compliance through full course of radiotherapy. Per Dr. Gao, continued ultrasound guidance and therapeutic radiology simulation-aided field setting verification per fraction is required for field placement, measurement of tumor depth, progress and acute effect monitoring.\\n\\n
Detail Level: Zone

## 2021-04-08 NOTE — PROCEDURE: SUPERFICIAL RADIATION TREATMENT
Time Dose Fractionation (Optional- Include Units If Applicable): 98
Simple Simulation Preamble Text Will Be Included With Simple Simulations (.......... Indications): Simple simulation was performed today for the following reasons:
Include Rx 2 When Rendering Additional Prescriptions: No
Energy (Optional-Please Include Units): 100 KV
Bill For Radiation Treatment: Yes
Field Number: 1
Field Size (Applicator): 4.0 cm
Fractions / Week: 3
Initial Radiation Treatment Planning (Will Render If Bill Simulation = Yes): The patient had a complete consultation regarding all applicable modalities for the treatment of their skin cancer and based on a variety of factors including the type of tumor, size, and location, the relevant medical history as well as local tissue factors, the functional status of the individual, the ability to perform necessary postoperative wound instructions and the need for simultaneous treatments as well as overall wound healing status, it was determined that the patient would begin radiation therapy treatment for skin cancer.  A full simulation and treatment device design was performed including the determination and formulation of appropriate simple and complex devices including lead shield of 0.762 mm thickness to form molded customized shielding to specifically correlate with the lesion size including treatment margin.  The custom lead shield is adequate to accommodate the appropriate applicator and provide adequate shielding around the treatment site.  The specific field applicator, shields, and devices both simple and complex as well as the specific patient setup is outlined below.  The patient was given a full consent for superficial radiation to both verbally and in writing and the full determination of patient's eligibility for treatment and selection is outlined on the patient eligibility and treatment selection form.  The specific superficial radiotherapy prescription was determined and was documented on the superficial radiotherapy prescription form.  A treatment calculation was also performed and documented on the treatment calculation form.  Based on the prescription, the patient was scheduled for a series of fractional treatments.
Total Dose (Optional-Please Include Units): 3458
Fractionation Number (Evaluation): 5
Shielding Size (Optional- Include Units): 3.5 cm x 3.5 cm
Dimensions-Y Axis In Cm: 1.4
Daily Fractionated Dose (Optional- Include Units): 237.6 cGy
Treatment Time In Min (Optional): 0.36
Fractionation Number: 16
Detail Level: Zone
Dose Per Fractionation In Cgy (Optional): 237.6
Patient Positioning: Sitting
Information: Selecting Yes will display possible errors in your note based on the variables you have selected. This validation is only offered as a suggestion for you. PLEASE NOTE THAT THE VALIDATION TEXT WILL BE REMOVED WHEN YOU FINALIZE YOUR NOTE. IF YOU WANT TO FAX A PRELIMINARY NOTE YOU WILL NEED TO TOGGLE THIS TO 'NO' IF YOU DO NOT WANT IT IN YOUR FAXED NOTE.
Total Number Of Fractions Rx 4: 15
Dimensions-X Axis In Cm: 1.9
Port Dimensions-X Axis In Cm: 3.5
Functional Status: 0 (fully active)
Custom Shielding Preamble Text Will Not Be Included With Simple Simulations (.......... X X Y Cm): A lead shield of 0.762 mm thickness is utilized to form a molded, custom shield with a
Treatment Margins In Cm: 0.5
Treatment Time / Fractionation (Optional- Include Units): 0.36 min
Treatment Device Design After Initial Simulation Justification (Will Render If Bill For Treatment Devices = Yes): The patient is status post radiation simulation and is evaluated as to the use of additional devices for shielding and placement for radiation therapy.
Day Of The Week Treatment Administered: Thursday
Assessment: Appropriate reaction
Depth (Optional-Please Include Units): 2.07 mm
Custom Shielding Afterword Text Will Not Be Included With Simple Simulations (X X Y Cm............): port to correlate with the lesion size, including treatment margin. The custom lead shield is adequate to accommodate the appropriate applicator and provide adequate shielding around the treatment site. Additional shielding (as noted below) is used to protect sensitive, normal tissues.
Additional Prescription Justification Text: If there is any interruption in treatment exceeding 5 days please see Decay and Dose Adjustment Calculation and complete treatment under Prescription 2, 3 or 4 as appropriate.
Total Number Of Fractions: 25
Limb Positioning Or Elevation: elevated
Cumulative Dose In Cgy (Optional): 3801.6

## 2021-04-13 ENCOUNTER — APPOINTMENT (RX ONLY)
Dept: URBAN - METROPOLITAN AREA CLINIC 29 | Facility: CLINIC | Age: 86
Setting detail: DERMATOLOGY
End: 2021-04-13

## 2021-04-13 ENCOUNTER — APPOINTMENT (RX ONLY)
Dept: URBAN - METROPOLITAN AREA CLINIC 30 | Facility: CLINIC | Age: 86
Setting detail: DERMATOLOGY
End: 2021-04-13

## 2021-04-13 PROBLEM — C44.722 SQUAMOUS CELL CARCINOMA OF SKIN OF RIGHT LOWER LIMB, INCLUDING HIP: Status: ACTIVE | Noted: 2021-04-13

## 2021-04-13 PROCEDURE — G6001 ECHO GUIDANCE RADIOTHERAPY: HCPCS

## 2021-04-13 PROCEDURE — ? FOLLOW UP FOR NEXT VISIT

## 2021-04-13 PROCEDURE — ? PHE RELATED SUPPLIES PROVIDED/DISPENSED

## 2021-04-13 PROCEDURE — 77401: CPT

## 2021-04-13 PROCEDURE — ? TREATMENT REGIMEN

## 2021-04-13 PROCEDURE — 77280 THER RAD SIMULAJ FIELD SMPL: CPT

## 2021-04-13 PROCEDURE — ? SUPERFICIAL RADIATION TREATMENT

## 2021-04-13 NOTE — PROCEDURE: TREATMENT REGIMEN
Plan: This patient has been treated today with image guided superficial radiation therapy for non-melanoma\\nskin cancer. Written informed consent has been previously obtained from this patient for this treatment. This\\nconsent is documented in the patient’s chart. The patient gave verbal consent to continue treatment\\ntoday. The patient was treated with a specific radiation dose and setup as prescribed by the provider listed on\\nthis visit note. A Radiation Therapist performed administration of radiation under supervision of\\nprovider. The treatment parameters and cumulative dose are indicated above. Prior to administering the radiation, the patient underwent a verification therapeutic radiology simulation-aided field setting defining relevant normal and abnormal target anatomy and acquiring images with high frequency ultrasound in addition to data necessary developing optimal radiation treatment process for the patient. This process includes verification of the treatment port(s) and proper treatment positioning. All treatment ports were photographed within electronic medical record. The\\npatient’s customized lead blocking along with gross tumor volume and margin was confirmed. Considering superficial radiotherapy is clinical in setup, this requires physician and radiation therapist to clarify location interest being treated against initial images, pathology and patient anatomy. Care was taken ensuring fields treated were geometrically accurate and properly positioned using therapeutic radiology simulation-aided field setting verification per fraction. This\\nprocess is also utilized to determine if any prescription or setup changes are necessary. These steps are\\ntherefore medically necessary ensuring safe and effective administration of radiation. Ongoing therapeutic radiology simulation-aided field setting verification is ordered throughout course of therapy. A high frequency ultrasound image was acquired today for two-dimensional evaluation of the tumor volume and response to treatment, in addition to geometric accuracy of field placement. US depth Is .62 mm, which is .02 mm in difference from previous imaging. The field placement and ultrasound imaging, per fraction, is separate and distinct from the initial simulation, and is an important task in providing safe administration of superficial radiation therapy. Physician evaluation of\\nthe ultrasound tumor depth will be ongoing throughout the course of treatment and is deemed medically necessary in order to ensure the efficacy of treatment and any necessary changes. Today’s image was evaluated for determination of continuation of treatment with the current plan or with necessary changes as appropriate. According to provider review of verification therapeutic radiology simulation-aided field setting and imaging, no change is required. Additionally, the use of ultrasound visualization and targeted assessment allows the patient to be able to see their cancer(s) progress, encouraging patient to complete and maintain compliance through full course of radiotherapy. Per Dr. Gao, continued ultrasound guidance and therapeutic radiology simulation-aided field setting verification per fraction is required for field placement, measurement of tumor depth, progress and acute effect monitoring.\\n\\n
Detail Level: Zone

## 2021-04-13 NOTE — PROCEDURE: SUPERFICIAL RADIATION TREATMENT
Treatment Time / Fractionation (Optional- Include Units): 0.36 min
Treatment Device Design After Initial Simulation Justification (Will Render If Bill For Treatment Devices = Yes): The patient is status post radiation simulation and is evaluated as to the use of additional devices for shielding and placement for radiation therapy.
Bill For Dosimetry/Render Decay And Dose Adjustment Calculation In Note: No
Prescription Used: 1
Fractions / Week Rx 4: 5
Total Number Of Fractions Rx 2: 15
Shielding Size (Optional- Include Units): 3.5 cm x 3.5 cm
Treatment Time In Min (Optional): 0.36
Dimensions-Y Axis In Cm: 1.4
Daily Fractionated Dose (Optional- Include Units): 237.6 cGy
Detail Level: Zone
Fractionation Number: 17
Validate Note Data (See Information Below): Yes
Depth (Optional-Please Include Units): 2.07 mm
Custom Shielding Afterword Text Will Not Be Included With Simple Simulations (X X Y Cm............): port to correlate with the lesion size, including treatment margin. The custom lead shield is adequate to accommodate the appropriate applicator and provide adequate shielding around the treatment site. Additional shielding (as noted below) is used to protect sensitive, normal tissues.
Port Dimensions-Y Axis In Cm: 3.5
Cumulative Dose In Cgy (Optional): 4039.2
Total Number Of Fractions: 25
Additional Prescription Justification Text: If there is any interruption in treatment exceeding 5 days please see Decay and Dose Adjustment Calculation and complete treatment under Prescription 2, 3 or 4 as appropriate.
Limb Positioning Or Elevation: elevated
Energy (Optional-Please Include Units): 100 KV
Custom Shielding Preamble Text Will Not Be Included With Simple Simulations (.......... X X Y Cm): A lead shield of 0.762 mm thickness is utilized to form a molded, custom shield with a
Field Size (Applicator): 4.0 cm
Dose / Tx In Cgy (Optional): 237.6
Treatment Margins In Cm: 0.5
Functional Status: 0 (fully active)
Fractions / Week: 3
Initial Radiation Treatment Planning (Will Render If Bill Simulation = Yes): The patient had a complete consultation regarding all applicable modalities for the treatment of their skin cancer and based on a variety of factors including the type of tumor, size, and location, the relevant medical history as well as local tissue factors, the functional status of the individual, the ability to perform necessary postoperative wound instructions and the need for simultaneous treatments as well as overall wound healing status, it was determined that the patient would begin radiation therapy treatment for skin cancer.  A full simulation and treatment device design was performed including the determination and formulation of appropriate simple and complex devices including lead shield of 0.762 mm thickness to form molded customized shielding to specifically correlate with the lesion size including treatment margin.  The custom lead shield is adequate to accommodate the appropriate applicator and provide adequate shielding around the treatment site.  The specific field applicator, shields, and devices both simple and complex as well as the specific patient setup is outlined below.  The patient was given a full consent for superficial radiation to both verbally and in writing and the full determination of patient's eligibility for treatment and selection is outlined on the patient eligibility and treatment selection form.  The specific superficial radiotherapy prescription was determined and was documented on the superficial radiotherapy prescription form.  A treatment calculation was also performed and documented on the treatment calculation form.  Based on the prescription, the patient was scheduled for a series of fractional treatments.
Total Dose (Optional-Please Include Units): 5072
Day Of The Week Treatment Administered: Tuesday
Assessment: Appropriate reaction
Patient Positioning: Sitting
Information: Selecting Yes will display possible errors in your note based on the variables you have selected. This validation is only offered as a suggestion for you. PLEASE NOTE THAT THE VALIDATION TEXT WILL BE REMOVED WHEN YOU FINALIZE YOUR NOTE. IF YOU WANT TO FAX A PRELIMINARY NOTE YOU WILL NEED TO TOGGLE THIS TO 'NO' IF YOU DO NOT WANT IT IN YOUR FAXED NOTE.
Simple Simulation Preamble Text Will Be Included With Simple Simulations (.......... Indications): Simple simulation was performed today for the following reasons:
Dimensions-X Axis In Cm: 1.9
Time Dose Fractionation (Optional- Include Units If Applicable): 98

## 2021-04-13 NOTE — PROCEDURE: SUPERFICIAL RADIATION TREATMENT
Detail Level: Zone
Field Number: 1
Dimensions-X Axis In Cm: 1.9
Dimensions-Y Axis In Cm: 1.4
Treatment Margins In Cm: 0.5
Render Additional Prescriptions In Note?: No
Render Patient Eligibility And Selection In Note?: Yes
Functional Status: 0 (fully active)
Port Dimensions-X Axis In Cm: 3.5
Field Size (Applicator): 4.0 cm
Patient Positioning: Sitting
Limb Positioning Or Elevation: elevated
Custom Shielding Preamble Text Will Not Be Included With Simple Simulations (.......... X X Y Cm): A lead shield of 0.762 mm thickness is utilized to form a molded, custom shield with a
Custom Shielding Afterword Text Will Not Be Included With Simple Simulations (X X Y Cm............): port to correlate with the lesion size, including treatment margin. The custom lead shield is adequate to accommodate the appropriate applicator and provide adequate shielding around the treatment site. Additional shielding (as noted below) is used to protect sensitive, normal tissues.
Initial Radiation Treatment Planning (Will Render If Bill Simulation = Yes): The patient had a complete consultation regarding all applicable modalities for the treatment of their skin cancer and based on a variety of factors including the type of tumor, size, and location, the relevant medical history as well as local tissue factors, the functional status of the individual, the ability to perform necessary postoperative wound instructions and the need for simultaneous treatments as well as overall wound healing status, it was determined that the patient would begin radiation therapy treatment for skin cancer.  A full simulation and treatment device design was performed including the determination and formulation of appropriate simple and complex devices including lead shield of 0.762 mm thickness to form molded customized shielding to specifically correlate with the lesion size including treatment margin.  The custom lead shield is adequate to accommodate the appropriate applicator and provide adequate shielding around the treatment site.  The specific field applicator, shields, and devices both simple and complex as well as the specific patient setup is outlined below.  The patient was given a full consent for superficial radiation to both verbally and in writing and the full determination of patient's eligibility for treatment and selection is outlined on the patient eligibility and treatment selection form.  The specific superficial radiotherapy prescription was determined and was documented on the superficial radiotherapy prescription form.  A treatment calculation was also performed and documented on the treatment calculation form.  Based on the prescription, the patient was scheduled for a series of fractional treatments.
Simple Simulation Preamble Text Will Be Included With Simple Simulations (.......... Indications): Simple simulation was performed today for the following reasons:
Treatment Device Design After Initial Simulation Justification (Will Render If Bill For Treatment Devices = Yes): The patient is status post radiation simulation and is evaluated as to the use of additional devices for shielding and placement for radiation therapy.
Additional Prescription Justification Text: If there is any interruption in treatment exceeding 5 days please see Decay and Dose Adjustment Calculation and complete treatment under Prescription 2, 3 or 4 as appropriate.
Shielding Size (Optional- Include Units): 3.5 cm x 3.5 cm
Energy (Optional-Please Include Units): 100 KV
Depth (Optional-Please Include Units): 2.07 mm
Fractions / Week: 3
Time Dose Fractionation (Optional- Include Units If Applicable): 98
Daily Fractionated Dose (Optional- Include Units): 237.6 cGy
Treatment Time / Fractionation (Optional- Include Units): 0.36 min
Total Number Of Fractions: 25
Total Dose (Optional-Please Include Units): 0165
Fractions / Week Rx 2: 5
Total Number Of Fractions Rx 2: 15
Dose Per Fractionation In Cgy (Optional): 237.6
Computed Treatment Time In Min (Will Render The Same As Calculated Treatment Time If Left Blank): 0.36
Day Of The Week Treatment Administered: Tuesday
Fractionation Number: 17
Cumulative Dose In Cgy (Optional): 4039.2
Assessment: Appropriate reaction
Information: Selecting Yes will display possible errors in your note based on the variables you have selected. This validation is only offered as a suggestion for you. PLEASE NOTE THAT THE VALIDATION TEXT WILL BE REMOVED WHEN YOU FINALIZE YOUR NOTE. IF YOU WANT TO FAX A PRELIMINARY NOTE YOU WILL NEED TO TOGGLE THIS TO 'NO' IF YOU DO NOT WANT IT IN YOUR FAXED NOTE.

## 2021-04-13 NOTE — PROCEDURE: TREATMENT REGIMEN
Detail Level: Zone
Plan: This patient has been treated today with image guided superficial radiation therapy for non-melanoma\\nskin cancer. Written informed consent has been previously obtained from this patient for this treatment. This\\nconsent is documented in the patient’s chart. The patient gave verbal consent to continue treatment\\ntoday. The patient was treated with a specific radiation dose and setup as prescribed by the provider listed on\\nthis visit note. A Radiation Therapist performed administration of radiation under supervision of\\nprovider. The treatment parameters and cumulative dose are indicated above. Prior to administering the radiation, the patient underwent a verification therapeutic radiology simulation-aided field setting defining relevant normal and abnormal target anatomy and acquiring images with high frequency ultrasound in addition to data necessary developing optimal radiation treatment process for the patient. This process includes verification of the treatment port(s) and proper treatment positioning. All treatment ports were photographed within electronic medical record. The\\npatient’s customized lead blocking along with gross tumor volume and margin was confirmed. Considering superficial radiotherapy is clinical in setup, this requires physician and radiation therapist to clarify location interest being treated against initial images, pathology and patient anatomy. Care was taken ensuring fields treated were geometrically accurate and properly positioned using therapeutic radiology simulation-aided field setting verification per fraction. This\\nprocess is also utilized to determine if any prescription or setup changes are necessary. These steps are\\ntherefore medically necessary ensuring safe and effective administration of radiation. Ongoing therapeutic radiology simulation-aided field setting verification is ordered throughout course of therapy. A high frequency ultrasound image was acquired today for two-dimensional evaluation of the tumor volume and response to treatment, in addition to geometric accuracy of field placement. US depth Is .62 mm, which is .02 mm in difference from previous imaging. The field placement and ultrasound imaging, per fraction, is separate and distinct from the initial simulation, and is an important task in providing safe administration of superficial radiation therapy. Physician evaluation of\\nthe ultrasound tumor depth will be ongoing throughout the course of treatment and is deemed medically necessary in order to ensure the efficacy of treatment and any necessary changes. Today’s image was evaluated for determination of continuation of treatment with the current plan or with necessary changes as appropriate. According to provider review of verification therapeutic radiology simulation-aided field setting and imaging, no change is required. Additionally, the use of ultrasound visualization and targeted assessment allows the patient to be able to see their cancer(s) progress, encouraging patient to complete and maintain compliance through full course of radiotherapy. Per Dr. Mitchell, continued ultrasound guidance and therapeutic radiology simulation-aided field setting verification per fraction is required for field placement, measurement of tumor depth, progress and acute effect monitoring.\\n\\n

## 2021-04-14 ENCOUNTER — APPOINTMENT (RX ONLY)
Dept: URBAN - METROPOLITAN AREA CLINIC 30 | Facility: CLINIC | Age: 86
Setting detail: DERMATOLOGY
End: 2021-04-14

## 2021-04-14 ENCOUNTER — APPOINTMENT (RX ONLY)
Dept: URBAN - METROPOLITAN AREA CLINIC 29 | Facility: CLINIC | Age: 86
Setting detail: DERMATOLOGY
End: 2021-04-14

## 2021-04-14 PROBLEM — C44.722 SQUAMOUS CELL CARCINOMA OF SKIN OF RIGHT LOWER LIMB, INCLUDING HIP: Status: ACTIVE | Noted: 2021-04-14

## 2021-04-14 PROCEDURE — ? TREATMENT REGIMEN

## 2021-04-14 PROCEDURE — 77280 THER RAD SIMULAJ FIELD SMPL: CPT

## 2021-04-14 PROCEDURE — ? PHE RELATED SUPPLIES PROVIDED/DISPENSED

## 2021-04-14 PROCEDURE — ? SUPERFICIAL RADIATION TREATMENT

## 2021-04-14 PROCEDURE — G6001 ECHO GUIDANCE RADIOTHERAPY: HCPCS

## 2021-04-14 PROCEDURE — ? FOLLOW UP FOR NEXT VISIT

## 2021-04-14 PROCEDURE — 77401: CPT

## 2021-04-14 NOTE — PROCEDURE: TREATMENT REGIMEN
Detail Level: Zone
Plan: This patient has been treated today with image guided superficial radiation therapy for non-melanoma\\nskin cancer. Written informed consent has been previously obtained from this patient for this treatment. This\\nconsent is documented in the patient’s chart. The patient gave verbal consent to continue treatment\\ntoday. The patient was treated with a specific radiation dose and setup as prescribed by the provider listed on\\nthis visit note. A Radiation Therapist performed administration of radiation under supervision of\\nprovider. The treatment parameters and cumulative dose are indicated above. Prior to administering the radiation, the patient underwent a verification therapeutic radiology simulation-aided field setting defining relevant normal and abnormal target anatomy and acquiring images with high frequency ultrasound in addition to data necessary developing optimal radiation treatment process for the patient. This process includes verification of the treatment port(s) and proper treatment positioning. All treatment ports were photographed within electronic medical record. The\\npatient’s customized lead blocking along with gross tumor volume and margin was confirmed. Considering superficial radiotherapy is clinical in setup, this requires physician and radiation therapist to clarify location interest being treated against initial images, pathology and patient anatomy. Care was taken ensuring fields treated were geometrically accurate and properly positioned using therapeutic radiology simulation-aided field setting verification per fraction. This\\nprocess is also utilized to determine if any prescription or setup changes are necessary. These steps are\\ntherefore medically necessary ensuring safe and effective administration of radiation. Ongoing therapeutic radiology simulation-aided field setting verification is ordered throughout course of therapy. A high frequency ultrasound image was acquired today for two-dimensional evaluation of the tumor volume and response to treatment, in addition to geometric accuracy of field placement. US depth Is .61 mm, which is .01 mm in difference from previous imaging. The field placement and ultrasound imaging, per fraction, is separate and distinct from the initial simulation, and is an important task in providing safe administration of superficial radiation therapy. Physician evaluation of\\nthe ultrasound tumor depth will be ongoing throughout the course of treatment and is deemed medically necessary in order to ensure the efficacy of treatment and any necessary changes. Today’s image was evaluated for determination of continuation of treatment with the current plan or with necessary changes as appropriate. According to provider review of verification therapeutic radiology simulation-aided field setting and imaging, no change is required. Additionally, the use of ultrasound visualization and targeted assessment allows the patient to be able to see their cancer(s) progress, encouraging patient to complete and maintain compliance through full course of radiotherapy. Per Dr. Mitchell, continued ultrasound guidance and therapeutic radiology simulation-aided field setting verification per fraction is required for field placement, measurement of tumor depth, progress and acute effect monitoring.\\n\\n

## 2021-04-14 NOTE — PROCEDURE: SUPERFICIAL RADIATION TREATMENT
Detail Level: Zone
Field Number: 1
Dimensions-X Axis In Cm: 1.9
Dimensions-Y Axis In Cm: 1.4
Treatment Margins In Cm: 0.5
Render Additional Prescriptions In Note?: No
Render Patient Eligibility And Selection In Note?: Yes
Functional Status: 0 (fully active)
Port Dimensions-X Axis In Cm: 3.5
Field Size (Applicator): 4.0 cm
Patient Positioning: Sitting
Limb Positioning Or Elevation: elevated
Custom Shielding Preamble Text Will Not Be Included With Simple Simulations (.......... X X Y Cm): A lead shield of 0.762 mm thickness is utilized to form a molded, custom shield with a
Custom Shielding Afterword Text Will Not Be Included With Simple Simulations (X X Y Cm............): port to correlate with the lesion size, including treatment margin. The custom lead shield is adequate to accommodate the appropriate applicator and provide adequate shielding around the treatment site. Additional shielding (as noted below) is used to protect sensitive, normal tissues.
Initial Radiation Treatment Planning (Will Render If Bill Simulation = Yes): The patient had a complete consultation regarding all applicable modalities for the treatment of their skin cancer and based on a variety of factors including the type of tumor, size, and location, the relevant medical history as well as local tissue factors, the functional status of the individual, the ability to perform necessary postoperative wound instructions and the need for simultaneous treatments as well as overall wound healing status, it was determined that the patient would begin radiation therapy treatment for skin cancer.  A full simulation and treatment device design was performed including the determination and formulation of appropriate simple and complex devices including lead shield of 0.762 mm thickness to form molded customized shielding to specifically correlate with the lesion size including treatment margin.  The custom lead shield is adequate to accommodate the appropriate applicator and provide adequate shielding around the treatment site.  The specific field applicator, shields, and devices both simple and complex as well as the specific patient setup is outlined below.  The patient was given a full consent for superficial radiation to both verbally and in writing and the full determination of patient's eligibility for treatment and selection is outlined on the patient eligibility and treatment selection form.  The specific superficial radiotherapy prescription was determined and was documented on the superficial radiotherapy prescription form.  A treatment calculation was also performed and documented on the treatment calculation form.  Based on the prescription, the patient was scheduled for a series of fractional treatments.
Simple Simulation Preamble Text Will Be Included With Simple Simulations (.......... Indications): Simple simulation was performed today for the following reasons:
Treatment Device Design After Initial Simulation Justification (Will Render If Bill For Treatment Devices = Yes): The patient is status post radiation simulation and is evaluated as to the use of additional devices for shielding and placement for radiation therapy.
Additional Prescription Justification Text: If there is any interruption in treatment exceeding 5 days please see Decay and Dose Adjustment Calculation and complete treatment under Prescription 2, 3 or 4 as appropriate.
Shielding Size (Optional- Include Units): 3.5 cm x 3.5 cm
Energy (Optional-Please Include Units): 100 KV
Depth (Optional-Please Include Units): 2.07 mm
Fractions / Week: 3
Time Dose Fractionation (Optional- Include Units If Applicable): 98
Daily Fractionated Dose (Optional- Include Units): 237.6 cGy
Treatment Time / Fractionation (Optional- Include Units): 0.36 min
Total Number Of Fractions: 25
Total Dose (Optional-Please Include Units): 2721
Fractions / Week Rx 2: 5
Total Number Of Fractions Rx 2: 15
Dose Per Fractionation In Cgy (Optional): 237.6
Computed Treatment Time In Min (Will Render The Same As Calculated Treatment Time If Left Blank): 0.36
Day Of The Week Treatment Administered: Wednesday
Fractionation Number: 18
Cumulative Dose In Cgy (Optional): 4276.8
Assessment: Appropriate reaction
Information: Selecting Yes will display possible errors in your note based on the variables you have selected. This validation is only offered as a suggestion for you. PLEASE NOTE THAT THE VALIDATION TEXT WILL BE REMOVED WHEN YOU FINALIZE YOUR NOTE. IF YOU WANT TO FAX A PRELIMINARY NOTE YOU WILL NEED TO TOGGLE THIS TO 'NO' IF YOU DO NOT WANT IT IN YOUR FAXED NOTE.

## 2021-04-14 NOTE — PROCEDURE: TREATMENT REGIMEN
Plan: This patient has been treated today with image guided superficial radiation therapy for non-melanoma\\nskin cancer. Written informed consent has been previously obtained from this patient for this treatment. This\\nconsent is documented in the patient’s chart. The patient gave verbal consent to continue treatment\\ntoday. The patient was treated with a specific radiation dose and setup as prescribed by the provider listed on\\nthis visit note. A Radiation Therapist performed administration of radiation under supervision of\\nprovider. The treatment parameters and cumulative dose are indicated above. Prior to administering the radiation, the patient underwent a verification therapeutic radiology simulation-aided field setting defining relevant normal and abnormal target anatomy and acquiring images with high frequency ultrasound in addition to data necessary developing optimal radiation treatment process for the patient. This process includes verification of the treatment port(s) and proper treatment positioning. All treatment ports were photographed within electronic medical record. The\\npatient’s customized lead blocking along with gross tumor volume and margin was confirmed. Considering superficial radiotherapy is clinical in setup, this requires physician and radiation therapist to clarify location interest being treated against initial images, pathology and patient anatomy. Care was taken ensuring fields treated were geometrically accurate and properly positioned using therapeutic radiology simulation-aided field setting verification per fraction. This\\nprocess is also utilized to determine if any prescription or setup changes are necessary. These steps are\\ntherefore medically necessary ensuring safe and effective administration of radiation. Ongoing therapeutic radiology simulation-aided field setting verification is ordered throughout course of therapy. A high frequency ultrasound image was acquired today for two-dimensional evaluation of the tumor volume and response to treatment, in addition to geometric accuracy of field placement. US depth Is .61 mm, which is .01 mm in difference from previous imaging. The field placement and ultrasound imaging, per fraction, is separate and distinct from the initial simulation, and is an important task in providing safe administration of superficial radiation therapy. Physician evaluation of\\nthe ultrasound tumor depth will be ongoing throughout the course of treatment and is deemed medically necessary in order to ensure the efficacy of treatment and any necessary changes. Today’s image was evaluated for determination of continuation of treatment with the current plan or with necessary changes as appropriate. According to provider review of verification therapeutic radiology simulation-aided field setting and imaging, no change is required. Additionally, the use of ultrasound visualization and targeted assessment allows the patient to be able to see their cancer(s) progress, encouraging patient to complete and maintain compliance through full course of radiotherapy. Per Dr. Gao, continued ultrasound guidance and therapeutic radiology simulation-aided field setting verification per fraction is required for field placement, measurement of tumor depth, progress and acute effect monitoring.\\n\\n
Detail Level: Zone

## 2021-04-14 NOTE — PROCEDURE: SUPERFICIAL RADIATION TREATMENT
Limb Positioning Or Elevation: elevated
Total Number Of Fractions: 25
Additional Prescription Justification Text: If there is any interruption in treatment exceeding 5 days please see Decay and Dose Adjustment Calculation and complete treatment under Prescription 2, 3 or 4 as appropriate.
Render Prescriptions In Note?: No
Computed Treatment Time In Min (Will Render The Same As Calculated Treatment Time If Left Blank): 0.36
Fractions / Week Rx 2: 5
Energy (Optional-Please Include Units): 100 KV
Field Size (Applicator): 4.0 cm
Dose / Tx In Cgy (Optional): 237.6
Custom Shielding Preamble Text Will Not Be Included With Simple Simulations (.......... X X Y Cm): A lead shield of 0.762 mm thickness is utilized to form a molded, custom shield with a
Port Dimensions-X Axis In Cm: 3.5
Treatment Device Design After Initial Simulation Justification (Will Render If Bill For Treatment Devices = Yes): The patient is status post radiation simulation and is evaluated as to the use of additional devices for shielding and placement for radiation therapy.
Total Number Of Fractions Rx 3: 15
Treatment Margins In Cm: 0.5
Treatment Time / Fractionation (Optional- Include Units): 0.36 min
Prescription Used: 1
Fractions / Week: 3
Initial Radiation Treatment Planning (Will Render If Bill Simulation = Yes): The patient had a complete consultation regarding all applicable modalities for the treatment of their skin cancer and based on a variety of factors including the type of tumor, size, and location, the relevant medical history as well as local tissue factors, the functional status of the individual, the ability to perform necessary postoperative wound instructions and the need for simultaneous treatments as well as overall wound healing status, it was determined that the patient would begin radiation therapy treatment for skin cancer.  A full simulation and treatment device design was performed including the determination and formulation of appropriate simple and complex devices including lead shield of 0.762 mm thickness to form molded customized shielding to specifically correlate with the lesion size including treatment margin.  The custom lead shield is adequate to accommodate the appropriate applicator and provide adequate shielding around the treatment site.  The specific field applicator, shields, and devices both simple and complex as well as the specific patient setup is outlined below.  The patient was given a full consent for superficial radiation to both verbally and in writing and the full determination of patient's eligibility for treatment and selection is outlined on the patient eligibility and treatment selection form.  The specific superficial radiotherapy prescription was determined and was documented on the superficial radiotherapy prescription form.  A treatment calculation was also performed and documented on the treatment calculation form.  Based on the prescription, the patient was scheduled for a series of fractional treatments.
Functional Status: 0 (fully active)
Total Dose (Optional-Please Include Units): 0605
Day Of The Week Treatment Administered: Wednesday
Depth (Optional-Please Include Units): 2.07 mm
Custom Shielding Afterword Text Will Not Be Included With Simple Simulations (X X Y Cm............): port to correlate with the lesion size, including treatment margin. The custom lead shield is adequate to accommodate the appropriate applicator and provide adequate shielding around the treatment site. Additional shielding (as noted below) is used to protect sensitive, normal tissues.
Validate Note Data (See Information Below): Yes
Assessment: Appropriate reaction
Cumulative Dose In Cgy (Optional): 4276.8
Time Dose Fractionation (Optional- Include Units If Applicable): 98
Simple Simulation Preamble Text Will Be Included With Simple Simulations (.......... Indications): Simple simulation was performed today for the following reasons:
Dimensions-X Axis In Cm: 1.9
Shielding Size (Optional- Include Units): 3.5 cm x 3.5 cm
Daily Fractionated Dose (Optional- Include Units): 237.6 cGy
Dimensions-Y Axis In Cm: 1.4
Detail Level: Zone
Fractionation Number: 18
Information: Selecting Yes will display possible errors in your note based on the variables you have selected. This validation is only offered as a suggestion for you. PLEASE NOTE THAT THE VALIDATION TEXT WILL BE REMOVED WHEN YOU FINALIZE YOUR NOTE. IF YOU WANT TO FAX A PRELIMINARY NOTE YOU WILL NEED TO TOGGLE THIS TO 'NO' IF YOU DO NOT WANT IT IN YOUR FAXED NOTE.
Patient Positioning: Sitting

## 2021-04-15 ENCOUNTER — APPOINTMENT (RX ONLY)
Dept: URBAN - METROPOLITAN AREA CLINIC 30 | Facility: CLINIC | Age: 86
Setting detail: DERMATOLOGY
End: 2021-04-15

## 2021-04-15 ENCOUNTER — APPOINTMENT (RX ONLY)
Dept: URBAN - METROPOLITAN AREA CLINIC 29 | Facility: CLINIC | Age: 86
Setting detail: DERMATOLOGY
End: 2021-04-15

## 2021-04-15 PROBLEM — C44.722 SQUAMOUS CELL CARCINOMA OF SKIN OF RIGHT LOWER LIMB, INCLUDING HIP: Status: ACTIVE | Noted: 2021-04-15

## 2021-04-15 PROCEDURE — ? TREATMENT REGIMEN

## 2021-04-15 PROCEDURE — 77280 THER RAD SIMULAJ FIELD SMPL: CPT

## 2021-04-15 PROCEDURE — ? SUPERFICIAL RADIATION TREATMENT

## 2021-04-15 PROCEDURE — ? PHE RELATED SUPPLIES PROVIDED/DISPENSED

## 2021-04-15 PROCEDURE — ? FOLLOW UP FOR NEXT VISIT

## 2021-04-15 PROCEDURE — G6001 ECHO GUIDANCE RADIOTHERAPY: HCPCS

## 2021-04-15 PROCEDURE — 77401: CPT

## 2021-04-15 NOTE — PROCEDURE: SUPERFICIAL RADIATION TREATMENT
Field Size (Applicator): 4.0 cm
Total Dose (Optional-Please Include Units): 1604
Day Of The Week Treatment Administered: Thursday
Number Of Treatment Days: 1
Fractions / Week Rx 3: 5
Include Rx 3 When Rendering Additional Prescriptions: No
Validate Note Data (See Information Below): Yes
Custom Shielding Afterword Text Will Not Be Included With Simple Simulations (X X Y Cm............): port to correlate with the lesion size, including treatment margin. The custom lead shield is adequate to accommodate the appropriate applicator and provide adequate shielding around the treatment site. Additional shielding (as noted below) is used to protect sensitive, normal tissues.
Depth (Optional-Please Include Units): 2.07 mm
Assessment: Appropriate reaction
Cumulative Dose In Cgy (Optional): 4514.4
Port Dimensions-Y Axis In Cm: 3.5
Limb Positioning Or Elevation: elevated
Total Number Of Fractions: 25
Additional Prescription Justification Text: If there is any interruption in treatment exceeding 5 days please see Decay and Dose Adjustment Calculation and complete treatment under Prescription 2, 3 or 4 as appropriate.
Time Dose Fractionation (Optional- Include Units If Applicable): 98
Simple Simulation Preamble Text Will Be Included With Simple Simulations (.......... Indications): Simple simulation was performed today for the following reasons:
Dimensions-X Axis In Cm: 1.9
Energy (Optional-Please Include Units): 100 KV
Fractions / Week: 3
Initial Radiation Treatment Planning (Will Render If Bill Simulation = Yes): The patient had a complete consultation regarding all applicable modalities for the treatment of their skin cancer and based on a variety of factors including the type of tumor, size, and location, the relevant medical history as well as local tissue factors, the functional status of the individual, the ability to perform necessary postoperative wound instructions and the need for simultaneous treatments as well as overall wound healing status, it was determined that the patient would begin radiation therapy treatment for skin cancer.  A full simulation and treatment device design was performed including the determination and formulation of appropriate simple and complex devices including lead shield of 0.762 mm thickness to form molded customized shielding to specifically correlate with the lesion size including treatment margin.  The custom lead shield is adequate to accommodate the appropriate applicator and provide adequate shielding around the treatment site.  The specific field applicator, shields, and devices both simple and complex as well as the specific patient setup is outlined below.  The patient was given a full consent for superficial radiation to both verbally and in writing and the full determination of patient's eligibility for treatment and selection is outlined on the patient eligibility and treatment selection form.  The specific superficial radiotherapy prescription was determined and was documented on the superficial radiotherapy prescription form.  A treatment calculation was also performed and documented on the treatment calculation form.  Based on the prescription, the patient was scheduled for a series of fractional treatments.
Functional Status: 0 (fully active)
Total Number Of Fractions Rx 2: 15
Treatment Time In Min (Optional): 0.36
Shielding Size (Optional- Include Units): 3.5 cm x 3.5 cm
Daily Fractionated Dose (Optional- Include Units): 237.6 cGy
Dimensions-Y Axis In Cm: 1.4
Detail Level: Zone
Fractionation Number: 19
Information: Selecting Yes will display possible errors in your note based on the variables you have selected. This validation is only offered as a suggestion for you. PLEASE NOTE THAT THE VALIDATION TEXT WILL BE REMOVED WHEN YOU FINALIZE YOUR NOTE. IF YOU WANT TO FAX A PRELIMINARY NOTE YOU WILL NEED TO TOGGLE THIS TO 'NO' IF YOU DO NOT WANT IT IN YOUR FAXED NOTE.
Patient Positioning: Sitting
Dose Per Fractionation In Cgy (Optional): 237.6
Custom Shielding Preamble Text Will Not Be Included With Simple Simulations (.......... X X Y Cm): A lead shield of 0.762 mm thickness is utilized to form a molded, custom shield with a
Treatment Device Design After Initial Simulation Justification (Will Render If Bill For Treatment Devices = Yes): The patient is status post radiation simulation and is evaluated as to the use of additional devices for shielding and placement for radiation therapy.
Treatment Margins In Cm: 0.5
Treatment Time / Fractionation (Optional- Include Units): 0.36 min

## 2021-04-15 NOTE — PROCEDURE: SUPERFICIAL RADIATION TREATMENT
Detail Level: Zone
Field Number: 1
Dimensions-X Axis In Cm: 1.9
Dimensions-Y Axis In Cm: 1.4
Treatment Margins In Cm: 0.5
Render Additional Prescriptions In Note?: No
Render Patient Eligibility And Selection In Note?: Yes
Functional Status: 0 (fully active)
Port Dimensions-X Axis In Cm: 3.5
Field Size (Applicator): 4.0 cm
Patient Positioning: Sitting
Limb Positioning Or Elevation: elevated
Custom Shielding Preamble Text Will Not Be Included With Simple Simulations (.......... X X Y Cm): A lead shield of 0.762 mm thickness is utilized to form a molded, custom shield with a
Custom Shielding Afterword Text Will Not Be Included With Simple Simulations (X X Y Cm............): port to correlate with the lesion size, including treatment margin. The custom lead shield is adequate to accommodate the appropriate applicator and provide adequate shielding around the treatment site. Additional shielding (as noted below) is used to protect sensitive, normal tissues.
Initial Radiation Treatment Planning (Will Render If Bill Simulation = Yes): The patient had a complete consultation regarding all applicable modalities for the treatment of their skin cancer and based on a variety of factors including the type of tumor, size, and location, the relevant medical history as well as local tissue factors, the functional status of the individual, the ability to perform necessary postoperative wound instructions and the need for simultaneous treatments as well as overall wound healing status, it was determined that the patient would begin radiation therapy treatment for skin cancer.  A full simulation and treatment device design was performed including the determination and formulation of appropriate simple and complex devices including lead shield of 0.762 mm thickness to form molded customized shielding to specifically correlate with the lesion size including treatment margin.  The custom lead shield is adequate to accommodate the appropriate applicator and provide adequate shielding around the treatment site.  The specific field applicator, shields, and devices both simple and complex as well as the specific patient setup is outlined below.  The patient was given a full consent for superficial radiation to both verbally and in writing and the full determination of patient's eligibility for treatment and selection is outlined on the patient eligibility and treatment selection form.  The specific superficial radiotherapy prescription was determined and was documented on the superficial radiotherapy prescription form.  A treatment calculation was also performed and documented on the treatment calculation form.  Based on the prescription, the patient was scheduled for a series of fractional treatments.
Simple Simulation Preamble Text Will Be Included With Simple Simulations (.......... Indications): Simple simulation was performed today for the following reasons:
Treatment Device Design After Initial Simulation Justification (Will Render If Bill For Treatment Devices = Yes): The patient is status post radiation simulation and is evaluated as to the use of additional devices for shielding and placement for radiation therapy.
Additional Prescription Justification Text: If there is any interruption in treatment exceeding 5 days please see Decay and Dose Adjustment Calculation and complete treatment under Prescription 2, 3 or 4 as appropriate.
Shielding Size (Optional- Include Units): 3.5 cm x 3.5 cm
Energy (Optional-Please Include Units): 100 KV
Depth (Optional-Please Include Units): 2.07 mm
Fractions / Week: 3
Time Dose Fractionation (Optional- Include Units If Applicable): 98
Daily Fractionated Dose (Optional- Include Units): 237.6 cGy
Treatment Time / Fractionation (Optional- Include Units): 0.36 min
Total Number Of Fractions: 25
Total Dose (Optional-Please Include Units): 5344
Fractions / Week Rx 2: 5
Total Number Of Fractions Rx 2: 15
Dose Per Fractionation In Cgy (Optional): 237.6
Computed Treatment Time In Min (Will Render The Same As Calculated Treatment Time If Left Blank): 0.36
Day Of The Week Treatment Administered: Thursday
Fractionation Number: 19
Cumulative Dose In Cgy (Optional): 4514.4
Assessment: Appropriate reaction
Information: Selecting Yes will display possible errors in your note based on the variables you have selected. This validation is only offered as a suggestion for you. PLEASE NOTE THAT THE VALIDATION TEXT WILL BE REMOVED WHEN YOU FINALIZE YOUR NOTE. IF YOU WANT TO FAX A PRELIMINARY NOTE YOU WILL NEED TO TOGGLE THIS TO 'NO' IF YOU DO NOT WANT IT IN YOUR FAXED NOTE.

## 2021-04-15 NOTE — PROCEDURE: TREATMENT REGIMEN
Plan: This patient has been treated today with image guided superficial radiation therapy for non-melanoma\\nskin cancer. Written informed consent has been previously obtained from this patient for this treatment. This\\nconsent is documented in the patient’s chart. The patient gave verbal consent to continue treatment\\ntoday. The patient was treated with a specific radiation dose and setup as prescribed by the provider listed on\\nthis visit note. A Radiation Therapist performed administration of radiation under supervision of\\nprovider. The treatment parameters and cumulative dose are indicated above. Prior to administering the radiation, the patient underwent a verification therapeutic radiology simulation-aided field setting defining relevant normal and abnormal target anatomy and acquiring images with high frequency ultrasound in addition to data necessary developing optimal radiation treatment process for the patient. This process includes verification of the treatment port(s) and proper treatment positioning. All treatment ports were photographed within electronic medical record. The\\npatient’s customized lead blocking along with gross tumor volume and margin was confirmed. Considering superficial radiotherapy is clinical in setup, this requires physician and radiation therapist to clarify location interest being treated against initial images, pathology and patient anatomy. Care was taken ensuring fields treated were geometrically accurate and properly positioned using therapeutic radiology simulation-aided field setting verification per fraction. This\\nprocess is also utilized to determine if any prescription or setup changes are necessary. These steps are\\ntherefore medically necessary ensuring safe and effective administration of radiation. Ongoing therapeutic radiology simulation-aided field setting verification is ordered throughout course of therapy. A high frequency ultrasound image was acquired today for two-dimensional evaluation of the tumor volume and response to treatment, in addition to geometric accuracy of field placement. US depth Is .58 mm, which is .03 mm in difference from previous imaging. The field placement and ultrasound imaging, per fraction, is separate and distinct from the initial simulation, and is an important task in providing safe administration of superficial radiation therapy. Physician evaluation of\\nthe ultrasound tumor depth will be ongoing throughout the course of treatment and is deemed medically necessary in order to ensure the efficacy of treatment and any necessary changes. Today’s image was evaluated for determination of continuation of treatment with the current plan or with necessary changes as appropriate. According to provider review of verification therapeutic radiology simulation-aided field setting and imaging, no change is required. Additionally, the use of ultrasound visualization and targeted assessment allows the patient to be able to see their cancer(s) progress, encouraging patient to complete and maintain compliance through full course of radiotherapy. Per Dr. Gao, continued ultrasound guidance and therapeutic radiology simulation-aided field setting verification per fraction is required for field placement, measurement of tumor depth, progress and acute effect monitoring.\\n\\n
Detail Level: Zone

## 2021-04-15 NOTE — PROCEDURE: TREATMENT REGIMEN
Detail Level: Zone
Plan: This patient has been treated today with image guided superficial radiation therapy for non-melanoma\\nskin cancer. Written informed consent has been previously obtained from this patient for this treatment. This\\nconsent is documented in the patient’s chart. The patient gave verbal consent to continue treatment\\ntoday. The patient was treated with a specific radiation dose and setup as prescribed by the provider listed on\\nthis visit note. A Radiation Therapist performed administration of radiation under supervision of\\nprovider. The treatment parameters and cumulative dose are indicated above. Prior to administering the radiation, the patient underwent a verification therapeutic radiology simulation-aided field setting defining relevant normal and abnormal target anatomy and acquiring images with high frequency ultrasound in addition to data necessary developing optimal radiation treatment process for the patient. This process includes verification of the treatment port(s) and proper treatment positioning. All treatment ports were photographed within electronic medical record. The\\npatient’s customized lead blocking along with gross tumor volume and margin was confirmed. Considering superficial radiotherapy is clinical in setup, this requires physician and radiation therapist to clarify location interest being treated against initial images, pathology and patient anatomy. Care was taken ensuring fields treated were geometrically accurate and properly positioned using therapeutic radiology simulation-aided field setting verification per fraction. This\\nprocess is also utilized to determine if any prescription or setup changes are necessary. These steps are\\ntherefore medically necessary ensuring safe and effective administration of radiation. Ongoing therapeutic radiology simulation-aided field setting verification is ordered throughout course of therapy. A high frequency ultrasound image was acquired today for two-dimensional evaluation of the tumor volume and response to treatment, in addition to geometric accuracy of field placement. US depth Is .58 mm, which is .03 mm in difference from previous imaging. The field placement and ultrasound imaging, per fraction, is separate and distinct from the initial simulation, and is an important task in providing safe administration of superficial radiation therapy. Physician evaluation of\\nthe ultrasound tumor depth will be ongoing throughout the course of treatment and is deemed medically necessary in order to ensure the efficacy of treatment and any necessary changes. Today’s image was evaluated for determination of continuation of treatment with the current plan or with necessary changes as appropriate. According to provider review of verification therapeutic radiology simulation-aided field setting and imaging, no change is required. Additionally, the use of ultrasound visualization and targeted assessment allows the patient to be able to see their cancer(s) progress, encouraging patient to complete and maintain compliance through full course of radiotherapy. Per Dr. Mitchell, continued ultrasound guidance and therapeutic radiology simulation-aided field setting verification per fraction is required for field placement, measurement of tumor depth, progress and acute effect monitoring.\\n\\n

## 2021-04-17 ENCOUNTER — APPOINTMENT (RX ONLY)
Dept: URBAN - METROPOLITAN AREA CLINIC 30 | Facility: CLINIC | Age: 86
Setting detail: DERMATOLOGY
End: 2021-04-17

## 2021-04-17 ENCOUNTER — APPOINTMENT (RX ONLY)
Dept: URBAN - METROPOLITAN AREA CLINIC 29 | Facility: CLINIC | Age: 86
Setting detail: DERMATOLOGY
End: 2021-04-17

## 2021-04-17 PROCEDURE — 77336 RADIATION PHYSICS CONSULT: CPT

## 2021-04-21 ENCOUNTER — APPOINTMENT (RX ONLY)
Dept: URBAN - METROPOLITAN AREA CLINIC 29 | Facility: CLINIC | Age: 86
Setting detail: DERMATOLOGY
End: 2021-04-21

## 2021-04-21 ENCOUNTER — APPOINTMENT (RX ONLY)
Dept: URBAN - METROPOLITAN AREA CLINIC 30 | Facility: CLINIC | Age: 86
Setting detail: DERMATOLOGY
End: 2021-04-21

## 2021-04-21 PROBLEM — C44.722 SQUAMOUS CELL CARCINOMA OF SKIN OF RIGHT LOWER LIMB, INCLUDING HIP: Status: ACTIVE | Noted: 2021-04-21

## 2021-04-21 PROCEDURE — ? FOLLOW UP FOR NEXT VISIT

## 2021-04-21 PROCEDURE — 77401: CPT

## 2021-04-21 PROCEDURE — ? TREATMENT REGIMEN

## 2021-04-21 PROCEDURE — ? PHE RELATED SUPPLIES PROVIDED/DISPENSED

## 2021-04-21 PROCEDURE — ? SUPERFICIAL RADIATION TREATMENT

## 2021-04-21 PROCEDURE — 77427 RADIATION TX MANAGEMENT X5: CPT

## 2021-04-21 PROCEDURE — G6001 ECHO GUIDANCE RADIOTHERAPY: HCPCS

## 2021-04-21 PROCEDURE — 77280 THER RAD SIMULAJ FIELD SMPL: CPT

## 2021-04-21 NOTE — PROCEDURE: SUPERFICIAL RADIATION TREATMENT
Detail Level: Zone
Field Number: 1
Dimensions-X Axis In Cm: 1.9
Dimensions-Y Axis In Cm: 1.4
Treatment Margins In Cm: 0.5
Render Additional Prescriptions In Note?: No
Render Patient Eligibility And Selection In Note?: Yes
Functional Status: 0 (fully active)
Port Dimensions-X Axis In Cm: 3.5
Field Size (Applicator): 4.0 cm
Patient Positioning: Sitting
Limb Positioning Or Elevation: elevated
Custom Shielding Preamble Text Will Not Be Included With Simple Simulations (.......... X X Y Cm): A lead shield of 0.762 mm thickness is utilized to form a molded, custom shield with a
Custom Shielding Afterword Text Will Not Be Included With Simple Simulations (X X Y Cm............): port to correlate with the lesion size, including treatment margin. The custom lead shield is adequate to accommodate the appropriate applicator and provide adequate shielding around the treatment site. Additional shielding (as noted below) is used to protect sensitive, normal tissues.
Initial Radiation Treatment Planning (Will Render If Bill Simulation = Yes): The patient had a complete consultation regarding all applicable modalities for the treatment of their skin cancer and based on a variety of factors including the type of tumor, size, and location, the relevant medical history as well as local tissue factors, the functional status of the individual, the ability to perform necessary postoperative wound instructions and the need for simultaneous treatments as well as overall wound healing status, it was determined that the patient would begin radiation therapy treatment for skin cancer.  A full simulation and treatment device design was performed including the determination and formulation of appropriate simple and complex devices including lead shield of 0.762 mm thickness to form molded customized shielding to specifically correlate with the lesion size including treatment margin.  The custom lead shield is adequate to accommodate the appropriate applicator and provide adequate shielding around the treatment site.  The specific field applicator, shields, and devices both simple and complex as well as the specific patient setup is outlined below.  The patient was given a full consent for superficial radiation to both verbally and in writing and the full determination of patient's eligibility for treatment and selection is outlined on the patient eligibility and treatment selection form.  The specific superficial radiotherapy prescription was determined and was documented on the superficial radiotherapy prescription form.  A treatment calculation was also performed and documented on the treatment calculation form.  Based on the prescription, the patient was scheduled for a series of fractional treatments.
Simple Simulation Preamble Text Will Be Included With Simple Simulations (.......... Indications): Simple simulation was performed today for the following reasons:
Treatment Device Design After Initial Simulation Justification (Will Render If Bill For Treatment Devices = Yes): The patient is status post radiation simulation and is evaluated as to the use of additional devices for shielding and placement for radiation therapy.
Additional Prescription Justification Text: If there is any interruption in treatment exceeding 5 days please see Decay and Dose Adjustment Calculation and complete treatment under Prescription 2, 3 or 4 as appropriate.
Shielding Size (Optional- Include Units): 3.5 cm x 3.5 cm
Energy (Optional-Please Include Units): 100 KV
Depth (Optional-Please Include Units): 2.07 mm
Fractions / Week: 3
Time Dose Fractionation (Optional- Include Units If Applicable): 98
Daily Fractionated Dose (Optional- Include Units): 237.6 cGy
Treatment Time / Fractionation (Optional- Include Units): 0.36 min
Total Number Of Fractions: 25
Total Dose (Optional-Please Include Units): 9377
Fractions / Week Rx 2: 5
Total Number Of Fractions Rx 2: 15
Dose Per Fractionation In Cgy (Optional): 237.6
Computed Treatment Time In Min (Will Render The Same As Calculated Treatment Time If Left Blank): 0.36
Day Of The Week Treatment Administered: Wednesday
Fractionation Number: 20
Cumulative Dose In Cgy (Optional): 4957
Assessment: Appropriate reaction
Comments: RTOG 3
Information: Selecting Yes will display possible errors in your note based on the variables you have selected. This validation is only offered as a suggestion for you. PLEASE NOTE THAT THE VALIDATION TEXT WILL BE REMOVED WHEN YOU FINALIZE YOUR NOTE. IF YOU WANT TO FAX A PRELIMINARY NOTE YOU WILL NEED TO TOGGLE THIS TO 'NO' IF YOU DO NOT WANT IT IN YOUR FAXED NOTE.

## 2021-04-21 NOTE — PROCEDURE: TREATMENT REGIMEN
Detail Level: Zone
Plan: This patient has been treated today with image guided superficial radiation therapy for non-melanoma\\nskin cancer. Written informed consent has been previously obtained from this patient for this treatment. This\\nconsent is documented in the patient’s chart. The patient gave verbal consent to continue treatment\\ntoday. The patient was treated with a specific radiation dose and setup as prescribed by the provider listed on\\nthis visit note. A Radiation Therapist performed administration of radiation under supervision of\\nprovider. The treatment parameters and cumulative dose are indicated above. Prior to administering the radiation, the patient underwent a verification therapeutic radiology simulation-aided field setting defining relevant normal and abnormal target anatomy and acquiring images with high frequency ultrasound in addition to data necessary developing optimal radiation treatment process for the patient. This process includes verification of the treatment port(s) and proper treatment positioning. All treatment ports were photographed within electronic medical record. The\\npatient’s customized lead blocking along with gross tumor volume and margin was confirmed. Considering superficial radiotherapy is clinical in setup, this requires physician and radiation therapist to clarify location interest being treated against initial images, pathology and patient anatomy. Care was taken ensuring fields treated were geometrically accurate and properly positioned using therapeutic radiology simulation-aided field setting verification per fraction. This\\nprocess is also utilized to determine if any prescription or setup changes are necessary. These steps are\\ntherefore medically necessary ensuring safe and effective administration of radiation. Ongoing therapeutic radiology simulation-aided field setting verification is ordered throughout course of therapy. A high frequency ultrasound image was acquired today for two-dimensional evaluation of the tumor volume and response to treatment, in addition to geometric accuracy of field placement. US depth Is .55 mm, which is .03 mm in difference from previous imaging. The field placement and ultrasound imaging, per fraction, is separate and distinct from the initial simulation, and is an important task in providing safe administration of superficial radiation therapy. Physician evaluation of\\nthe ultrasound tumor depth will be ongoing throughout the course of treatment and is deemed medically necessary in order to ensure the efficacy of treatment and any necessary changes. Today’s image was evaluated for determination of continuation of treatment with the current plan or with necessary changes as appropriate. According to provider review of verification therapeutic radiology simulation-aided field setting and imaging, no change is required. Additionally, the use of ultrasound visualization and targeted assessment allows the patient to be able to see their cancer(s) progress, encouraging patient to complete and maintain compliance through full course of radiotherapy. Per Dr. Mitchell, continued ultrasound guidance and therapeutic radiology simulation-aided field setting verification per fraction is required for field placement, measurement of tumor depth, progress and acute effect monitoring.\\n\\n

## 2021-04-21 NOTE — PROCEDURE: TREATMENT REGIMEN
Plan: This patient has been treated today with image guided superficial radiation therapy for non-melanoma\\nskin cancer. Written informed consent has been previously obtained from this patient for this treatment. This\\nconsent is documented in the patient’s chart. The patient gave verbal consent to continue treatment\\ntoday. The patient was treated with a specific radiation dose and setup as prescribed by the provider listed on\\nthis visit note. A Radiation Therapist performed administration of radiation under supervision of\\nprovider. The treatment parameters and cumulative dose are indicated above. Prior to administering the radiation, the patient underwent a verification therapeutic radiology simulation-aided field setting defining relevant normal and abnormal target anatomy and acquiring images with high frequency ultrasound in addition to data necessary developing optimal radiation treatment process for the patient. This process includes verification of the treatment port(s) and proper treatment positioning. All treatment ports were photographed within electronic medical record. The\\npatient’s customized lead blocking along with gross tumor volume and margin was confirmed. Considering superficial radiotherapy is clinical in setup, this requires physician and radiation therapist to clarify location interest being treated against initial images, pathology and patient anatomy. Care was taken ensuring fields treated were geometrically accurate and properly positioned using therapeutic radiology simulation-aided field setting verification per fraction. This\\nprocess is also utilized to determine if any prescription or setup changes are necessary. These steps are\\ntherefore medically necessary ensuring safe and effective administration of radiation. Ongoing therapeutic radiology simulation-aided field setting verification is ordered throughout course of therapy. A high frequency ultrasound image was acquired today for two-dimensional evaluation of the tumor volume and response to treatment, in addition to geometric accuracy of field placement. US depth Is .55 mm, which is .03 mm in difference from previous imaging. The field placement and ultrasound imaging, per fraction, is separate and distinct from the initial simulation, and is an important task in providing safe administration of superficial radiation therapy. Physician evaluation of\\nthe ultrasound tumor depth will be ongoing throughout the course of treatment and is deemed medically necessary in order to ensure the efficacy of treatment and any necessary changes. Today’s image was evaluated for determination of continuation of treatment with the current plan or with necessary changes as appropriate. According to provider review of verification therapeutic radiology simulation-aided field setting and imaging, no change is required. Additionally, the use of ultrasound visualization and targeted assessment allows the patient to be able to see their cancer(s) progress, encouraging patient to complete and maintain compliance through full course of radiotherapy. Per Dr. Gao, continued ultrasound guidance and therapeutic radiology simulation-aided field setting verification per fraction is required for field placement, measurement of tumor depth, progress and acute effect monitoring.\\n\\n
Detail Level: Zone

## 2021-04-21 NOTE — PROCEDURE: SUPERFICIAL RADIATION TREATMENT
Total Dose (Optional-Please Include Units): 2279
Field Size (Applicator): 4.0 cm
Assessment: Appropriate reaction
Day Of The Week Treatment Administered: Wednesday
Include Rx 3 When Rendering Additional Prescriptions: No
Custom Shielding Afterword Text Will Not Be Included With Simple Simulations (X X Y Cm............): port to correlate with the lesion size, including treatment margin. The custom lead shield is adequate to accommodate the appropriate applicator and provide adequate shielding around the treatment site. Additional shielding (as noted below) is used to protect sensitive, normal tissues.
Depth (Optional-Please Include Units): 2.07 mm
Cumulative Dose In Cgy (Optional): 3736
Port Dimensions-Y Axis In Cm: 3.5
Limb Positioning Or Elevation: elevated
Total Number Of Fractions: 25
Additional Prescription Justification Text: If there is any interruption in treatment exceeding 5 days please see Decay and Dose Adjustment Calculation and complete treatment under Prescription 2, 3 or 4 as appropriate.
Time Dose Fractionation (Optional- Include Units If Applicable): 98
Simple Simulation Preamble Text Will Be Included With Simple Simulations (.......... Indications): Simple simulation was performed today for the following reasons:
Bill For Dosimetry/Render Treatment Time Calculation In Note: Yes
Dimensions-X Axis In Cm: 1.9
Number Of Days Off Treatment: 1
Energy (Optional-Please Include Units): 100 KV
Fractionation Number (Evaluation): 5
Fractions / Week: 3
Initial Radiation Treatment Planning (Will Render If Bill Simulation = Yes): The patient had a complete consultation regarding all applicable modalities for the treatment of their skin cancer and based on a variety of factors including the type of tumor, size, and location, the relevant medical history as well as local tissue factors, the functional status of the individual, the ability to perform necessary postoperative wound instructions and the need for simultaneous treatments as well as overall wound healing status, it was determined that the patient would begin radiation therapy treatment for skin cancer.  A full simulation and treatment device design was performed including the determination and formulation of appropriate simple and complex devices including lead shield of 0.762 mm thickness to form molded customized shielding to specifically correlate with the lesion size including treatment margin.  The custom lead shield is adequate to accommodate the appropriate applicator and provide adequate shielding around the treatment site.  The specific field applicator, shields, and devices both simple and complex as well as the specific patient setup is outlined below.  The patient was given a full consent for superficial radiation to both verbally and in writing and the full determination of patient's eligibility for treatment and selection is outlined on the patient eligibility and treatment selection form.  The specific superficial radiotherapy prescription was determined and was documented on the superficial radiotherapy prescription form.  A treatment calculation was also performed and documented on the treatment calculation form.  Based on the prescription, the patient was scheduled for a series of fractional treatments.
Functional Status: 0 (fully active)
Total Number Of Fractions Rx 2: 15
Treatment Time In Min (Optional): 0.36
Shielding Size (Optional- Include Units): 3.5 cm x 3.5 cm
Information: Selecting Yes will display possible errors in your note based on the variables you have selected. This validation is only offered as a suggestion for you. PLEASE NOTE THAT THE VALIDATION TEXT WILL BE REMOVED WHEN YOU FINALIZE YOUR NOTE. IF YOU WANT TO FAX A PRELIMINARY NOTE YOU WILL NEED TO TOGGLE THIS TO 'NO' IF YOU DO NOT WANT IT IN YOUR FAXED NOTE.
Daily Fractionated Dose (Optional- Include Units): 237.6 cGy
Dimensions-Y Axis In Cm: 1.4
Detail Level: Zone
Fractionation Number: 20
Patient Positioning: Sitting
Dose Per Fractionation In Cgy (Optional): 237.6
Comments: RTOG 3
Custom Shielding Preamble Text Will Not Be Included With Simple Simulations (.......... X X Y Cm): A lead shield of 0.762 mm thickness is utilized to form a molded, custom shield with a
Treatment Device Design After Initial Simulation Justification (Will Render If Bill For Treatment Devices = Yes): The patient is status post radiation simulation and is evaluated as to the use of additional devices for shielding and placement for radiation therapy.
Treatment Margins In Cm: 0.5
Treatment Time / Fractionation (Optional- Include Units): 0.36 min

## 2021-04-22 ENCOUNTER — APPOINTMENT (RX ONLY)
Dept: URBAN - METROPOLITAN AREA CLINIC 29 | Facility: CLINIC | Age: 86
Setting detail: DERMATOLOGY
End: 2021-04-22

## 2021-04-22 ENCOUNTER — APPOINTMENT (RX ONLY)
Dept: URBAN - METROPOLITAN AREA CLINIC 30 | Facility: CLINIC | Age: 86
Setting detail: DERMATOLOGY
End: 2021-04-22

## 2021-04-22 PROBLEM — C44.722 SQUAMOUS CELL CARCINOMA OF SKIN OF RIGHT LOWER LIMB, INCLUDING HIP: Status: ACTIVE | Noted: 2021-04-22

## 2021-04-22 PROCEDURE — ? PHE RELATED SUPPLIES PROVIDED/DISPENSED

## 2021-04-22 PROCEDURE — ? TREATMENT REGIMEN

## 2021-04-22 PROCEDURE — G6001 ECHO GUIDANCE RADIOTHERAPY: HCPCS

## 2021-04-22 PROCEDURE — 77280 THER RAD SIMULAJ FIELD SMPL: CPT

## 2021-04-22 PROCEDURE — ? FOLLOW UP FOR NEXT VISIT

## 2021-04-22 PROCEDURE — 77401: CPT

## 2021-04-22 PROCEDURE — ? SUPERFICIAL RADIATION TREATMENT

## 2021-04-22 NOTE — PROCEDURE: SUPERFICIAL RADIATION TREATMENT
Detail Level: Zone
Field Number: 1
Dimensions-X Axis In Cm: 1.9
Dimensions-Y Axis In Cm: 1.4
Treatment Margins In Cm: 0.5
Render Additional Prescriptions In Note?: No
Render Patient Eligibility And Selection In Note?: Yes
Functional Status: 0 (fully active)
Port Dimensions-X Axis In Cm: 3.5
Field Size (Applicator): 4.0 cm
Patient Positioning: Sitting
Limb Positioning Or Elevation: elevated
Custom Shielding Preamble Text Will Not Be Included With Simple Simulations (.......... X X Y Cm): A lead shield of 0.762 mm thickness is utilized to form a molded, custom shield with a
Custom Shielding Afterword Text Will Not Be Included With Simple Simulations (X X Y Cm............): port to correlate with the lesion size, including treatment margin. The custom lead shield is adequate to accommodate the appropriate applicator and provide adequate shielding around the treatment site. Additional shielding (as noted below) is used to protect sensitive, normal tissues.
Initial Radiation Treatment Planning (Will Render If Bill Simulation = Yes): The patient had a complete consultation regarding all applicable modalities for the treatment of their skin cancer and based on a variety of factors including the type of tumor, size, and location, the relevant medical history as well as local tissue factors, the functional status of the individual, the ability to perform necessary postoperative wound instructions and the need for simultaneous treatments as well as overall wound healing status, it was determined that the patient would begin radiation therapy treatment for skin cancer.  A full simulation and treatment device design was performed including the determination and formulation of appropriate simple and complex devices including lead shield of 0.762 mm thickness to form molded customized shielding to specifically correlate with the lesion size including treatment margin.  The custom lead shield is adequate to accommodate the appropriate applicator and provide adequate shielding around the treatment site.  The specific field applicator, shields, and devices both simple and complex as well as the specific patient setup is outlined below.  The patient was given a full consent for superficial radiation to both verbally and in writing and the full determination of patient's eligibility for treatment and selection is outlined on the patient eligibility and treatment selection form.  The specific superficial radiotherapy prescription was determined and was documented on the superficial radiotherapy prescription form.  A treatment calculation was also performed and documented on the treatment calculation form.  Based on the prescription, the patient was scheduled for a series of fractional treatments.
Simple Simulation Preamble Text Will Be Included With Simple Simulations (.......... Indications): Simple simulation was performed today for the following reasons:
Treatment Device Design After Initial Simulation Justification (Will Render If Bill For Treatment Devices = Yes): The patient is status post radiation simulation and is evaluated as to the use of additional devices for shielding and placement for radiation therapy.
Additional Prescription Justification Text: If there is any interruption in treatment exceeding 5 days please see Decay and Dose Adjustment Calculation and complete treatment under Prescription 2, 3 or 4 as appropriate.
Shielding Size (Optional- Include Units): 3.5 cm x 3.5 cm
Energy (Optional-Please Include Units): 100 KV
Depth (Optional-Please Include Units): 2.07 mm
Fractions / Week: 3
Time Dose Fractionation (Optional- Include Units If Applicable): 98
Daily Fractionated Dose (Optional- Include Units): 237.6 cGy
Treatment Time / Fractionation (Optional- Include Units): 0.36 min
Total Number Of Fractions: 25
Total Dose (Optional-Please Include Units): 3890
Fractions / Week Rx 2: 5
Total Number Of Fractions Rx 2: 15
Dose Per Fractionation In Cgy (Optional): 237.6
Computed Treatment Time In Min (Will Render The Same As Calculated Treatment Time If Left Blank): 0.36
Day Of The Week Treatment Administered: Thursday
Fractionation Number: 21
Cumulative Dose In Cgy (Optional): 4989.6
Assessment: Appropriate reaction
Information: Selecting Yes will display possible errors in your note based on the variables you have selected. This validation is only offered as a suggestion for you. PLEASE NOTE THAT THE VALIDATION TEXT WILL BE REMOVED WHEN YOU FINALIZE YOUR NOTE. IF YOU WANT TO FAX A PRELIMINARY NOTE YOU WILL NEED TO TOGGLE THIS TO 'NO' IF YOU DO NOT WANT IT IN YOUR FAXED NOTE.

## 2021-04-22 NOTE — PROCEDURE: TREATMENT REGIMEN
Plan: This patient has been treated today with image guided superficial radiation therapy for non-melanoma\\nskin cancer. Written informed consent has been previously obtained from this patient for this treatment. This\\nconsent is documented in the patient’s chart. The patient gave verbal consent to continue treatment\\ntoday. The patient was treated with a specific radiation dose and setup as prescribed by the provider listed on\\nthis visit note. A Radiation Therapist performed administration of radiation under supervision of\\nprovider. The treatment parameters and cumulative dose are indicated above. Prior to administering the radiation, the patient underwent a verification therapeutic radiology simulation-aided field setting defining relevant normal and abnormal target anatomy and acquiring images with high frequency ultrasound in addition to data necessary developing optimal radiation treatment process for the patient. This process includes verification of the treatment port(s) and proper treatment positioning. All treatment ports were photographed within electronic medical record. The\\npatient’s customized lead blocking along with gross tumor volume and margin was confirmed. Considering superficial radiotherapy is clinical in setup, this requires physician and radiation therapist to clarify location interest being treated against initial images, pathology and patient anatomy. Care was taken ensuring fields treated were geometrically accurate and properly positioned using therapeutic radiology simulation-aided field setting verification per fraction. This\\nprocess is also utilized to determine if any prescription or setup changes are necessary. These steps are\\ntherefore medically necessary ensuring safe and effective administration of radiation. Ongoing therapeutic radiology simulation-aided field setting verification is ordered throughout course of therapy. A high frequency ultrasound image was acquired today for two-dimensional evaluation of the tumor volume and response to treatment, in addition to geometric accuracy of field placement. US depth Is .52 mm, which is .03 mm in difference from previous imaging. The field placement and ultrasound imaging, per fraction, is separate and distinct from the initial simulation, and is an important task in providing safe administration of superficial radiation therapy. Physician evaluation of\\nthe ultrasound tumor depth will be ongoing throughout the course of treatment and is deemed medically necessary in order to ensure the efficacy of treatment and any necessary changes. Today’s image was evaluated for determination of continuation of treatment with the current plan or with necessary changes as appropriate. According to provider review of verification therapeutic radiology simulation-aided field setting and imaging, no change is required. Additionally, the use of ultrasound visualization and targeted assessment allows the patient to be able to see their cancer(s) progress, encouraging patient to complete and maintain compliance through full course of radiotherapy. Per Dr. Goa, continued ultrasound guidance and therapeutic radiology simulation-aided field setting verification per fraction is required for field placement, measurement of tumor depth, progress and acute effect monitoring.\\n\\n
Detail Level: Zone

## 2021-04-22 NOTE — PROCEDURE: TREATMENT REGIMEN
Plan: This patient has been treated today with image guided superficial radiation therapy for non-melanoma\\nskin cancer. Written informed consent has been previously obtained from this patient for this treatment. This\\nconsent is documented in the patient’s chart. The patient gave verbal consent to continue treatment\\ntoday. The patient was treated with a specific radiation dose and setup as prescribed by the provider listed on\\nthis visit note. A Radiation Therapist performed administration of radiation under supervision of\\nprovider. The treatment parameters and cumulative dose are indicated above. Prior to administering the radiation, the patient underwent a verification therapeutic radiology simulation-aided field setting defining relevant normal and abnormal target anatomy and acquiring images with high frequency ultrasound in addition to data necessary developing optimal radiation treatment process for the patient. This process includes verification of the treatment port(s) and proper treatment positioning. All treatment ports were photographed within electronic medical record. The\\npatient’s customized lead blocking along with gross tumor volume and margin was confirmed. Considering superficial radiotherapy is clinical in setup, this requires physician and radiation therapist to clarify location interest being treated against initial images, pathology and patient anatomy. Care was taken ensuring fields treated were geometrically accurate and properly positioned using therapeutic radiology simulation-aided field setting verification per fraction. This\\nprocess is also utilized to determine if any prescription or setup changes are necessary. These steps are\\ntherefore medically necessary ensuring safe and effective administration of radiation. Ongoing therapeutic radiology simulation-aided field setting verification is ordered throughout course of therapy. A high frequency ultrasound image was acquired today for two-dimensional evaluation of the tumor volume and response to treatment, in addition to geometric accuracy of field placement. US depth Is .52 mm, which is .03 mm in difference from previous imaging. The field placement and ultrasound imaging, per fraction, is separate and distinct from the initial simulation, and is an important task in providing safe administration of superficial radiation therapy. Physician evaluation of\\nthe ultrasound tumor depth will be ongoing throughout the course of treatment and is deemed medically necessary in order to ensure the efficacy of treatment and any necessary changes. Today’s image was evaluated for determination of continuation of treatment with the current plan or with necessary changes as appropriate. According to provider review of verification therapeutic radiology simulation-aided field setting and imaging, no change is required. Additionally, the use of ultrasound visualization and targeted assessment allows the patient to be able to see their cancer(s) progress, encouraging patient to complete and maintain compliance through full course of radiotherapy. Per Dr. Mitchell, continued ultrasound guidance and therapeutic radiology simulation-aided field setting verification per fraction is required for field placement, measurement of tumor depth, progress and acute effect monitoring.\\n\\n
Detail Level: Zone

## 2021-04-22 NOTE — PROCEDURE: SUPERFICIAL RADIATION TREATMENT
Fractions / Week Rx 2: 5
Energy (Optional-Please Include Units): 100 KV
Field Size (Applicator): 4.0 cm
Bill For Treatment Devices Only: No
Dose / Tx In Cgy (Optional): 237.6
Dimensions-Y Axis In Cm: 1.4
Custom Shielding Preamble Text Will Not Be Included With Simple Simulations (.......... X X Y Cm): A lead shield of 0.762 mm thickness is utilized to form a molded, custom shield with a
Port Dimensions-X Axis In Cm: 3.5
Total Number Of Fractions Rx 3: 15
Treatment Time / Fractionation (Optional- Include Units): 0.36 min
Treatment Device Design After Initial Simulation Justification (Will Render If Bill For Treatment Devices = Yes): The patient is status post radiation simulation and is evaluated as to the use of additional devices for shielding and placement for radiation therapy.
Detail Level: Zone
Prescription Used: 1
Shielding Size (Optional- Include Units): 3.5 cm x 3.5 cm
Render Patient Eligibility And Selection In Note?: Yes
Day Of The Week Treatment Administered: Thursday
Depth (Optional-Please Include Units): 2.07 mm
Custom Shielding Afterword Text Will Not Be Included With Simple Simulations (X X Y Cm............): port to correlate with the lesion size, including treatment margin. The custom lead shield is adequate to accommodate the appropriate applicator and provide adequate shielding around the treatment site. Additional shielding (as noted below) is used to protect sensitive, normal tissues.
Assessment: Appropriate reaction
Cumulative Dose In Cgy (Optional): 4989.6
Treatment Margins In Cm: 0.5
Total Number Of Fractions: 25
Additional Prescription Justification Text: If there is any interruption in treatment exceeding 5 days please see Decay and Dose Adjustment Calculation and complete treatment under Prescription 2, 3 or 4 as appropriate.
Limb Positioning Or Elevation: elevated
Computed Treatment Time In Min (Will Render The Same As Calculated Treatment Time If Left Blank): 0.36
Fractions / Week: 3
Initial Radiation Treatment Planning (Will Render If Bill Simulation = Yes): The patient had a complete consultation regarding all applicable modalities for the treatment of their skin cancer and based on a variety of factors including the type of tumor, size, and location, the relevant medical history as well as local tissue factors, the functional status of the individual, the ability to perform necessary postoperative wound instructions and the need for simultaneous treatments as well as overall wound healing status, it was determined that the patient would begin radiation therapy treatment for skin cancer.  A full simulation and treatment device design was performed including the determination and formulation of appropriate simple and complex devices including lead shield of 0.762 mm thickness to form molded customized shielding to specifically correlate with the lesion size including treatment margin.  The custom lead shield is adequate to accommodate the appropriate applicator and provide adequate shielding around the treatment site.  The specific field applicator, shields, and devices both simple and complex as well as the specific patient setup is outlined below.  The patient was given a full consent for superficial radiation to both verbally and in writing and the full determination of patient's eligibility for treatment and selection is outlined on the patient eligibility and treatment selection form.  The specific superficial radiotherapy prescription was determined and was documented on the superficial radiotherapy prescription form.  A treatment calculation was also performed and documented on the treatment calculation form.  Based on the prescription, the patient was scheduled for a series of fractional treatments.
Functional Status: 0 (fully active)
Total Dose (Optional-Please Include Units): 7774
Dimensions-X Axis In Cm: 1.9
Daily Fractionated Dose (Optional- Include Units): 237.6 cGy
Fractionation Number: 21
Information: Selecting Yes will display possible errors in your note based on the variables you have selected. This validation is only offered as a suggestion for you. PLEASE NOTE THAT THE VALIDATION TEXT WILL BE REMOVED WHEN YOU FINALIZE YOUR NOTE. IF YOU WANT TO FAX A PRELIMINARY NOTE YOU WILL NEED TO TOGGLE THIS TO 'NO' IF YOU DO NOT WANT IT IN YOUR FAXED NOTE.
Patient Positioning: Sitting
Time Dose Fractionation (Optional- Include Units If Applicable): 98
Simple Simulation Preamble Text Will Be Included With Simple Simulations (.......... Indications): Simple simulation was performed today for the following reasons:

## 2021-04-28 ENCOUNTER — APPOINTMENT (RX ONLY)
Dept: URBAN - METROPOLITAN AREA CLINIC 29 | Facility: CLINIC | Age: 86
Setting detail: DERMATOLOGY
End: 2021-04-28

## 2021-04-28 ENCOUNTER — APPOINTMENT (RX ONLY)
Dept: URBAN - METROPOLITAN AREA CLINIC 30 | Facility: CLINIC | Age: 86
Setting detail: DERMATOLOGY
End: 2021-04-28

## 2021-04-28 PROBLEM — C44.722 SQUAMOUS CELL CARCINOMA OF SKIN OF RIGHT LOWER LIMB, INCLUDING HIP: Status: ACTIVE | Noted: 2021-04-28

## 2021-04-28 PROCEDURE — ? TREATMENT REGIMEN

## 2021-04-28 PROCEDURE — ? PHE RELATED SUPPLIES PROVIDED/DISPENSED

## 2021-04-28 PROCEDURE — 77401: CPT

## 2021-04-28 PROCEDURE — G6001 ECHO GUIDANCE RADIOTHERAPY: HCPCS

## 2021-04-28 PROCEDURE — ? FOLLOW UP FOR NEXT VISIT

## 2021-04-28 PROCEDURE — ? SUPERFICIAL RADIATION TREATMENT

## 2021-04-28 PROCEDURE — 77280 THER RAD SIMULAJ FIELD SMPL: CPT

## 2021-04-28 NOTE — PROCEDURE: TREATMENT REGIMEN
Plan: This patient has been treated today with image guided superficial radiation therapy for non-melanoma\\nskin cancer. Written informed consent has been previously obtained from this patient for this treatment. This\\nconsent is documented in the patient’s chart. The patient gave verbal consent to continue treatment\\ntoday. The patient was treated with a specific radiation dose and setup as prescribed by the provider listed on\\nthis visit note. A Radiation Therapist performed administration of radiation under supervision of\\nprovider. The treatment parameters and cumulative dose are indicated above. Prior to administering the radiation, the patient underwent a verification therapeutic radiology simulation-aided field setting defining relevant normal and abnormal target anatomy and acquiring images with high frequency ultrasound in addition to data necessary developing optimal radiation treatment process for the patient. This process includes verification of the treatment port(s) and proper treatment positioning. All treatment ports were photographed within electronic medical record. The\\npatient’s customized lead blocking along with gross tumor volume and margin was confirmed. Considering superficial radiotherapy is clinical in setup, this requires physician and radiation therapist to clarify location interest being treated against initial images, pathology and patient anatomy. Care was taken ensuring fields treated were geometrically accurate and properly positioned using therapeutic radiology simulation-aided field setting verification per fraction. This\\nprocess is also utilized to determine if any prescription or setup changes are necessary. These steps are\\ntherefore medically necessary ensuring safe and effective administration of radiation. Ongoing therapeutic radiology simulation-aided field setting verification is ordered throughout course of therapy. A high frequency ultrasound image was acquired today for two-dimensional evaluation of the tumor volume and response to treatment, in addition to geometric accuracy of field placement. US depth Is .51 mm, which is .01 mm in difference from previous imaging. The field placement and ultrasound imaging, per fraction, is separate and distinct from the initial simulation, and is an important task in providing safe administration of superficial radiation therapy. Physician evaluation of\\nthe ultrasound tumor depth will be ongoing throughout the course of treatment and is deemed medically necessary in order to ensure the efficacy of treatment and any necessary changes. Today’s image was evaluated for determination of continuation of treatment with the current plan or with necessary changes as appropriate. According to provider review of verification therapeutic radiology simulation-aided field setting and imaging, no change is required. Additionally, the use of ultrasound visualization and targeted assessment allows the patient to be able to see their cancer(s) progress, encouraging patient to complete and maintain compliance through full course of radiotherapy. Per Dr. Gao, continued ultrasound guidance and therapeutic radiology simulation-aided field setting verification per fraction is required for field placement, measurement of tumor depth, progress and acute effect monitoring.\\n\\n
Detail Level: Zone

## 2021-04-28 NOTE — PROCEDURE: SUPERFICIAL RADIATION TREATMENT
Include Rx 3 When Rendering Additional Prescriptions: No
Custom Shielding Afterword Text Will Not Be Included With Simple Simulations (X X Y Cm............): port to correlate with the lesion size, including treatment margin. The custom lead shield is adequate to accommodate the appropriate applicator and provide adequate shielding around the treatment site. Additional shielding (as noted below) is used to protect sensitive, normal tissues.
Depth (Optional-Please Include Units): 2.07 mm
Assessment: Appropriate reaction
Cumulative Dose In Cgy (Optional): 5227.2
Limb Positioning Or Elevation: elevated
Total Number Of Fractions: 25
Additional Prescription Justification Text: If there is any interruption in treatment exceeding 5 days please see Decay and Dose Adjustment Calculation and complete treatment under Prescription 2, 3 or 4 as appropriate.
Computed Treatment Time In Min (Will Render The Same As Calculated Treatment Time If Left Blank): 0.36
Information: Selecting Yes will display possible errors in your note based on the variables you have selected. This validation is only offered as a suggestion for you. PLEASE NOTE THAT THE VALIDATION TEXT WILL BE REMOVED WHEN YOU FINALIZE YOUR NOTE. IF YOU WANT TO FAX A PRELIMINARY NOTE YOU WILL NEED TO TOGGLE THIS TO 'NO' IF YOU DO NOT WANT IT IN YOUR FAXED NOTE.
Fractions / Week Rx 2: 5
Energy (Optional-Please Include Units): 100 KV
Field Size (Applicator): 4.0 cm
Bill For Radiation Treatment: Yes
Total Number Of Fractions Rx 3: 15
Number Of Days Off Treatment: 1
Fractions / Week: 3
Initial Radiation Treatment Planning (Will Render If Bill Simulation = Yes): The patient had a complete consultation regarding all applicable modalities for the treatment of their skin cancer and based on a variety of factors including the type of tumor, size, and location, the relevant medical history as well as local tissue factors, the functional status of the individual, the ability to perform necessary postoperative wound instructions and the need for simultaneous treatments as well as overall wound healing status, it was determined that the patient would begin radiation therapy treatment for skin cancer.  A full simulation and treatment device design was performed including the determination and formulation of appropriate simple and complex devices including lead shield of 0.762 mm thickness to form molded customized shielding to specifically correlate with the lesion size including treatment margin.  The custom lead shield is adequate to accommodate the appropriate applicator and provide adequate shielding around the treatment site.  The specific field applicator, shields, and devices both simple and complex as well as the specific patient setup is outlined below.  The patient was given a full consent for superficial radiation to both verbally and in writing and the full determination of patient's eligibility for treatment and selection is outlined on the patient eligibility and treatment selection form.  The specific superficial radiotherapy prescription was determined and was documented on the superficial radiotherapy prescription form.  A treatment calculation was also performed and documented on the treatment calculation form.  Based on the prescription, the patient was scheduled for a series of fractional treatments.
Port Dimensions-X Axis In Cm: 3.5
Functional Status: 0 (fully active)
Total Dose (Optional-Please Include Units): 1126
Day Of The Week Treatment Administered: Wednesday
Detail Level: Zone
Fractionation Number: 22
Patient Positioning: Sitting
Dose Per Fractionation In Cgy (Optional): 237.6
Time Dose Fractionation (Optional- Include Units If Applicable): 98
Simple Simulation Preamble Text Will Be Included With Simple Simulations (.......... Indications): Simple simulation was performed today for the following reasons:
Dimensions-X Axis In Cm: 1.9
Custom Shielding Preamble Text Will Not Be Included With Simple Simulations (.......... X X Y Cm): A lead shield of 0.762 mm thickness is utilized to form a molded, custom shield with a
Treatment Device Design After Initial Simulation Justification (Will Render If Bill For Treatment Devices = Yes): The patient is status post radiation simulation and is evaluated as to the use of additional devices for shielding and placement for radiation therapy.
Treatment Margins In Cm: 0.5
Treatment Time / Fractionation (Optional- Include Units): 0.36 min
Shielding Size (Optional- Include Units): 3.5 cm x 3.5 cm
Daily Fractionated Dose (Optional- Include Units): 237.6 cGy
Dimensions-Y Axis In Cm: 1.4

## 2021-04-28 NOTE — PROCEDURE: TREATMENT REGIMEN
Detail Level: Zone
Plan: This patient has been treated today with image guided superficial radiation therapy for non-melanoma\\nskin cancer. Written informed consent has been previously obtained from this patient for this treatment. This\\nconsent is documented in the patient’s chart. The patient gave verbal consent to continue treatment\\ntoday. The patient was treated with a specific radiation dose and setup as prescribed by the provider listed on\\nthis visit note. A Radiation Therapist performed administration of radiation under supervision of\\nprovider. The treatment parameters and cumulative dose are indicated above. Prior to administering the radiation, the patient underwent a verification therapeutic radiology simulation-aided field setting defining relevant normal and abnormal target anatomy and acquiring images with high frequency ultrasound in addition to data necessary developing optimal radiation treatment process for the patient. This process includes verification of the treatment port(s) and proper treatment positioning. All treatment ports were photographed within electronic medical record. The\\npatient’s customized lead blocking along with gross tumor volume and margin was confirmed. Considering superficial radiotherapy is clinical in setup, this requires physician and radiation therapist to clarify location interest being treated against initial images, pathology and patient anatomy. Care was taken ensuring fields treated were geometrically accurate and properly positioned using therapeutic radiology simulation-aided field setting verification per fraction. This\\nprocess is also utilized to determine if any prescription or setup changes are necessary. These steps are\\ntherefore medically necessary ensuring safe and effective administration of radiation. Ongoing therapeutic radiology simulation-aided field setting verification is ordered throughout course of therapy. A high frequency ultrasound image was acquired today for two-dimensional evaluation of the tumor volume and response to treatment, in addition to geometric accuracy of field placement. US depth Is .51 mm, which is .01 mm in difference from previous imaging. The field placement and ultrasound imaging, per fraction, is separate and distinct from the initial simulation, and is an important task in providing safe administration of superficial radiation therapy. Physician evaluation of\\nthe ultrasound tumor depth will be ongoing throughout the course of treatment and is deemed medically necessary in order to ensure the efficacy of treatment and any necessary changes. Today’s image was evaluated for determination of continuation of treatment with the current plan or with necessary changes as appropriate. According to provider review of verification therapeutic radiology simulation-aided field setting and imaging, no change is required. Additionally, the use of ultrasound visualization and targeted assessment allows the patient to be able to see their cancer(s) progress, encouraging patient to complete and maintain compliance through full course of radiotherapy. Per Dr. Mitchell, continued ultrasound guidance and therapeutic radiology simulation-aided field setting verification per fraction is required for field placement, measurement of tumor depth, progress and acute effect monitoring.\\n\\n

## 2021-04-28 NOTE — PROCEDURE: SUPERFICIAL RADIATION TREATMENT
Detail Level: Zone
Field Number: 1
Dimensions-X Axis In Cm: 1.9
Dimensions-Y Axis In Cm: 1.4
Treatment Margins In Cm: 0.5
Render Additional Prescriptions In Note?: No
Render Patient Eligibility And Selection In Note?: Yes
Functional Status: 0 (fully active)
Port Dimensions-X Axis In Cm: 3.5
Field Size (Applicator): 4.0 cm
Patient Positioning: Sitting
Limb Positioning Or Elevation: elevated
Custom Shielding Preamble Text Will Not Be Included With Simple Simulations (.......... X X Y Cm): A lead shield of 0.762 mm thickness is utilized to form a molded, custom shield with a
Custom Shielding Afterword Text Will Not Be Included With Simple Simulations (X X Y Cm............): port to correlate with the lesion size, including treatment margin. The custom lead shield is adequate to accommodate the appropriate applicator and provide adequate shielding around the treatment site. Additional shielding (as noted below) is used to protect sensitive, normal tissues.
Initial Radiation Treatment Planning (Will Render If Bill Simulation = Yes): The patient had a complete consultation regarding all applicable modalities for the treatment of their skin cancer and based on a variety of factors including the type of tumor, size, and location, the relevant medical history as well as local tissue factors, the functional status of the individual, the ability to perform necessary postoperative wound instructions and the need for simultaneous treatments as well as overall wound healing status, it was determined that the patient would begin radiation therapy treatment for skin cancer.  A full simulation and treatment device design was performed including the determination and formulation of appropriate simple and complex devices including lead shield of 0.762 mm thickness to form molded customized shielding to specifically correlate with the lesion size including treatment margin.  The custom lead shield is adequate to accommodate the appropriate applicator and provide adequate shielding around the treatment site.  The specific field applicator, shields, and devices both simple and complex as well as the specific patient setup is outlined below.  The patient was given a full consent for superficial radiation to both verbally and in writing and the full determination of patient's eligibility for treatment and selection is outlined on the patient eligibility and treatment selection form.  The specific superficial radiotherapy prescription was determined and was documented on the superficial radiotherapy prescription form.  A treatment calculation was also performed and documented on the treatment calculation form.  Based on the prescription, the patient was scheduled for a series of fractional treatments.
Simple Simulation Preamble Text Will Be Included With Simple Simulations (.......... Indications): Simple simulation was performed today for the following reasons:
Treatment Device Design After Initial Simulation Justification (Will Render If Bill For Treatment Devices = Yes): The patient is status post radiation simulation and is evaluated as to the use of additional devices for shielding and placement for radiation therapy.
Additional Prescription Justification Text: If there is any interruption in treatment exceeding 5 days please see Decay and Dose Adjustment Calculation and complete treatment under Prescription 2, 3 or 4 as appropriate.
Shielding Size (Optional- Include Units): 3.5 cm x 3.5 cm
Energy (Optional-Please Include Units): 100 KV
Depth (Optional-Please Include Units): 2.07 mm
Fractions / Week: 3
Time Dose Fractionation (Optional- Include Units If Applicable): 98
Daily Fractionated Dose (Optional- Include Units): 237.6 cGy
Treatment Time / Fractionation (Optional- Include Units): 0.36 min
Total Number Of Fractions: 25
Total Dose (Optional-Please Include Units): 8483
Fractions / Week Rx 2: 5
Total Number Of Fractions Rx 2: 15
Dose Per Fractionation In Cgy (Optional): 237.6
Computed Treatment Time In Min (Will Render The Same As Calculated Treatment Time If Left Blank): 0.36
Day Of The Week Treatment Administered: Wednesday
Fractionation Number: 22
Cumulative Dose In Cgy (Optional): 5227.2
Assessment: Appropriate reaction
Information: Selecting Yes will display possible errors in your note based on the variables you have selected. This validation is only offered as a suggestion for you. PLEASE NOTE THAT THE VALIDATION TEXT WILL BE REMOVED WHEN YOU FINALIZE YOUR NOTE. IF YOU WANT TO FAX A PRELIMINARY NOTE YOU WILL NEED TO TOGGLE THIS TO 'NO' IF YOU DO NOT WANT IT IN YOUR FAXED NOTE.

## 2021-04-30 ENCOUNTER — APPOINTMENT (RX ONLY)
Dept: URBAN - METROPOLITAN AREA CLINIC 29 | Facility: CLINIC | Age: 86
Setting detail: DERMATOLOGY
End: 2021-04-30

## 2021-04-30 ENCOUNTER — APPOINTMENT (RX ONLY)
Dept: URBAN - METROPOLITAN AREA CLINIC 30 | Facility: CLINIC | Age: 86
Setting detail: DERMATOLOGY
End: 2021-04-30

## 2021-04-30 PROBLEM — C44.722 SQUAMOUS CELL CARCINOMA OF SKIN OF RIGHT LOWER LIMB, INCLUDING HIP: Status: ACTIVE | Noted: 2021-04-30

## 2021-04-30 PROCEDURE — ? FOLLOW UP FOR NEXT VISIT

## 2021-04-30 PROCEDURE — ? PHE RELATED SUPPLIES PROVIDED/DISPENSED

## 2021-04-30 PROCEDURE — ? SUPERFICIAL RADIATION TREATMENT

## 2021-04-30 PROCEDURE — 77280 THER RAD SIMULAJ FIELD SMPL: CPT

## 2021-04-30 PROCEDURE — ? TREATMENT REGIMEN

## 2021-04-30 PROCEDURE — 77401: CPT

## 2021-04-30 PROCEDURE — G6001 ECHO GUIDANCE RADIOTHERAPY: HCPCS

## 2021-04-30 NOTE — PROCEDURE: SUPERFICIAL RADIATION TREATMENT
Field Number: 1
Fractions / Week: 3
Port Dimensions-X Axis In Cm: 3.5
Energy (Include Units): 100 KV
Functional Status: 0 (fully active)
Fractionation Number (Evaluation): 5
Initial Radiation Treatment Planning (Will Render If Bill Simulation = Yes): The patient had a complete consultation regarding all applicable modalities for the treatment of their skin cancer and based on a variety of factors including the type of tumor, size, and location, the relevant medical history as well as local tissue factors, the functional status of the individual, the ability to perform necessary postoperative wound instructions and the need for simultaneous treatments as well as overall wound healing status, it was determined that the patient would begin radiation therapy treatment for skin cancer.  A full simulation and treatment device design was performed including the determination and formulation of appropriate simple and complex devices including lead shield of 0.762 mm thickness to form molded customized shielding to specifically correlate with the lesion size including treatment margin.  The custom lead shield is adequate to accommodate the appropriate applicator and provide adequate shielding around the treatment site.  The specific field applicator, shields, and devices both simple and complex as well as the specific patient setup is outlined below.  The patient was given a full consent for superficial radiation to both verbally and in writing and the full determination of patient's eligibility for treatment and selection is outlined on the patient eligibility and treatment selection form.  The specific superficial radiotherapy prescription was determined and was documented on the superficial radiotherapy prescription form.  A treatment calculation was also performed and documented on the treatment calculation form.  Based on the prescription, the patient was scheduled for a series of fractional treatments.
Field Size (Applicator): 4.0 cm
Total Dose (Optional-Please Include Units): 2623
Day Of The Week Treatment Administered: Friday
Include Rx 3 When Rendering Additional Prescriptions: No
Assessment: Appropriate reaction
Patient Positioning: Sitting
Dose Per Fractionation In Cgy (Optional): 237.6
Total Number Of Fractions Rx 4: 15
Render Patient Eligibility And Selection In Note?: Yes
Dimensions-X Axis In Cm: 1.9
Time Dose Fractionation (Optional- Include Units If Applicable): 98
Simple Simulation Preamble Text Will Be Included With Simple Simulations (.......... Indications): Simple simulation was performed today for the following reasons:
Treatment Time In Min (Optional): 0.36
Shielding Size (Optional- Include Units): 3.5 cm x 3.5 cm
Daily Fractionated Dose (Optional- Include Units): 237.6 cGy
Dimensions-Y Axis In Cm: 1.4
Fractionation Number: 23
Detail Level: Zone
Cumulative Dose In Cgy (Optional): 5464.8
Depth (Optional-Please Include Units): 2.07 mm
Custom Shielding Afterword Text Will Not Be Included With Simple Simulations (X X Y Cm............): port to correlate with the lesion size, including treatment margin. The custom lead shield is adequate to accommodate the appropriate applicator and provide adequate shielding around the treatment site. Additional shielding (as noted below) is used to protect sensitive, normal tissues.
Additional Prescription Justification Text: If there is any interruption in treatment exceeding 5 days please see Decay and Dose Adjustment Calculation and complete treatment under Prescription 2, 3 or 4 as appropriate.
Total Number Of Fractions: 25
Limb Positioning Or Elevation: elevated
Information: Selecting Yes will display possible errors in your note based on the variables you have selected. This validation is only offered as a suggestion for you. PLEASE NOTE THAT THE VALIDATION TEXT WILL BE REMOVED WHEN YOU FINALIZE YOUR NOTE. IF YOU WANT TO FAX A PRELIMINARY NOTE YOU WILL NEED TO TOGGLE THIS TO 'NO' IF YOU DO NOT WANT IT IN YOUR FAXED NOTE.
Custom Shielding Preamble Text Will Not Be Included With Simple Simulations (.......... X X Y Cm): A lead shield of 0.762 mm thickness is utilized to form a molded, custom shield with a
Treatment Time / Fractionation (Optional- Include Units): 0.36 min
Treatment Device Design After Initial Simulation Justification (Will Render If Bill For Treatment Devices = Yes): The patient is status post radiation simulation and is evaluated as to the use of additional devices for shielding and placement for radiation therapy.
Treatment Margins In Cm: 0.5

## 2021-04-30 NOTE — PROCEDURE: SUPERFICIAL RADIATION TREATMENT
Detail Level: Zone
Field Number: 1
Dimensions-X Axis In Cm: 1.9
Dimensions-Y Axis In Cm: 1.4
Treatment Margins In Cm: 0.5
Render Additional Prescriptions In Note?: No
Render Patient Eligibility And Selection In Note?: Yes
Functional Status: 0 (fully active)
Port Dimensions-X Axis In Cm: 3.5
Field Size (Applicator): 4.0 cm
Patient Positioning: Sitting
Limb Positioning Or Elevation: elevated
Custom Shielding Preamble Text Will Not Be Included With Simple Simulations (.......... X X Y Cm): A lead shield of 0.762 mm thickness is utilized to form a molded, custom shield with a
Custom Shielding Afterword Text Will Not Be Included With Simple Simulations (X X Y Cm............): port to correlate with the lesion size, including treatment margin. The custom lead shield is adequate to accommodate the appropriate applicator and provide adequate shielding around the treatment site. Additional shielding (as noted below) is used to protect sensitive, normal tissues.
Initial Radiation Treatment Planning (Will Render If Bill Simulation = Yes): The patient had a complete consultation regarding all applicable modalities for the treatment of their skin cancer and based on a variety of factors including the type of tumor, size, and location, the relevant medical history as well as local tissue factors, the functional status of the individual, the ability to perform necessary postoperative wound instructions and the need for simultaneous treatments as well as overall wound healing status, it was determined that the patient would begin radiation therapy treatment for skin cancer.  A full simulation and treatment device design was performed including the determination and formulation of appropriate simple and complex devices including lead shield of 0.762 mm thickness to form molded customized shielding to specifically correlate with the lesion size including treatment margin.  The custom lead shield is adequate to accommodate the appropriate applicator and provide adequate shielding around the treatment site.  The specific field applicator, shields, and devices both simple and complex as well as the specific patient setup is outlined below.  The patient was given a full consent for superficial radiation to both verbally and in writing and the full determination of patient's eligibility for treatment and selection is outlined on the patient eligibility and treatment selection form.  The specific superficial radiotherapy prescription was determined and was documented on the superficial radiotherapy prescription form.  A treatment calculation was also performed and documented on the treatment calculation form.  Based on the prescription, the patient was scheduled for a series of fractional treatments.
Simple Simulation Preamble Text Will Be Included With Simple Simulations (.......... Indications): Simple simulation was performed today for the following reasons:
Treatment Device Design After Initial Simulation Justification (Will Render If Bill For Treatment Devices = Yes): The patient is status post radiation simulation and is evaluated as to the use of additional devices for shielding and placement for radiation therapy.
Additional Prescription Justification Text: If there is any interruption in treatment exceeding 5 days please see Decay and Dose Adjustment Calculation and complete treatment under Prescription 2, 3 or 4 as appropriate.
Shielding Size (Optional- Include Units): 3.5 cm x 3.5 cm
Energy (Optional-Please Include Units): 100 KV
Depth (Optional-Please Include Units): 2.07 mm
Fractions / Week: 3
Time Dose Fractionation (Optional- Include Units If Applicable): 98
Daily Fractionated Dose (Optional- Include Units): 237.6 cGy
Treatment Time / Fractionation (Optional- Include Units): 0.36 min
Total Number Of Fractions: 25
Total Dose (Optional-Please Include Units): 3765
Fractions / Week Rx 2: 5
Total Number Of Fractions Rx 2: 15
Dose Per Fractionation In Cgy (Optional): 237.6
Computed Treatment Time In Min (Will Render The Same As Calculated Treatment Time If Left Blank): 0.36
Day Of The Week Treatment Administered: Friday
Fractionation Number: 23
Cumulative Dose In Cgy (Optional): 5464.8
Assessment: Appropriate reaction
Information: Selecting Yes will display possible errors in your note based on the variables you have selected. This validation is only offered as a suggestion for you. PLEASE NOTE THAT THE VALIDATION TEXT WILL BE REMOVED WHEN YOU FINALIZE YOUR NOTE. IF YOU WANT TO FAX A PRELIMINARY NOTE YOU WILL NEED TO TOGGLE THIS TO 'NO' IF YOU DO NOT WANT IT IN YOUR FAXED NOTE.

## 2021-04-30 NOTE — PROCEDURE: TREATMENT REGIMEN
Plan: This patient has been treated today with image guided superficial radiation therapy for non-melanoma\\nskin cancer. Written informed consent has been previously obtained from this patient for this treatment. This\\nconsent is documented in the patient’s chart. The patient gave verbal consent to continue treatment\\ntoday. The patient was treated with a specific radiation dose and setup as prescribed by the provider listed on\\nthis visit note. A Radiation Therapist performed administration of radiation under supervision of\\nprovider. The treatment parameters and cumulative dose are indicated above. Prior to administering the radiation, the patient underwent a verification therapeutic radiology simulation-aided field setting defining relevant normal and abnormal target anatomy and acquiring images with high frequency ultrasound in addition to data necessary developing optimal radiation treatment process for the patient. This process includes verification of the treatment port(s) and proper treatment positioning. All treatment ports were photographed within electronic medical record. The\\npatient’s customized lead blocking along with gross tumor volume and margin was confirmed. Considering superficial radiotherapy is clinical in setup, this requires physician and radiation therapist to clarify location interest being treated against initial images, pathology and patient anatomy. Care was taken ensuring fields treated were geometrically accurate and properly positioned using therapeutic radiology simulation-aided field setting verification per fraction. This\\nprocess is also utilized to determine if any prescription or setup changes are necessary. These steps are\\ntherefore medically necessary ensuring safe and effective administration of radiation. Ongoing therapeutic radiology simulation-aided field setting verification is ordered throughout course of therapy. A high frequency ultrasound image was acquired today for two-dimensional evaluation of the tumor volume and response to treatment, in addition to geometric accuracy of field placement. US depth Is .48 mm, which is .03 mm in difference from previous imaging. The field placement and ultrasound imaging, per fraction, is separate and distinct from the initial simulation, and is an important task in providing safe administration of superficial radiation therapy. Physician evaluation of\\nthe ultrasound tumor depth will be ongoing throughout the course of treatment and is deemed medically necessary in order to ensure the efficacy of treatment and any necessary changes. Today’s image was evaluated for determination of continuation of treatment with the current plan or with necessary changes as appropriate. According to provider review of verification therapeutic radiology simulation-aided field setting and imaging, no change is required. Additionally, the use of ultrasound visualization and targeted assessment allows the patient to be able to see their cancer(s) progress, encouraging patient to complete and maintain compliance through full course of radiotherapy. Per Dr. Gao, continued ultrasound guidance and therapeutic radiology simulation-aided field setting verification per fraction is required for field placement, measurement of tumor depth, progress and acute effect monitoring.\\n\\n
Detail Level: Zone

## 2021-04-30 NOTE — PROCEDURE: TREATMENT REGIMEN
Plan: This patient has been treated today with image guided superficial radiation therapy for non-melanoma\\nskin cancer. Written informed consent has been previously obtained from this patient for this treatment. This\\nconsent is documented in the patient’s chart. The patient gave verbal consent to continue treatment\\ntoday. The patient was treated with a specific radiation dose and setup as prescribed by the provider listed on\\nthis visit note. A Radiation Therapist performed administration of radiation under supervision of\\nprovider. The treatment parameters and cumulative dose are indicated above. Prior to administering the radiation, the patient underwent a verification therapeutic radiology simulation-aided field setting defining relevant normal and abnormal target anatomy and acquiring images with high frequency ultrasound in addition to data necessary developing optimal radiation treatment process for the patient. This process includes verification of the treatment port(s) and proper treatment positioning. All treatment ports were photographed within electronic medical record. The\\npatient’s customized lead blocking along with gross tumor volume and margin was confirmed. Considering superficial radiotherapy is clinical in setup, this requires physician and radiation therapist to clarify location interest being treated against initial images, pathology and patient anatomy. Care was taken ensuring fields treated were geometrically accurate and properly positioned using therapeutic radiology simulation-aided field setting verification per fraction. This\\nprocess is also utilized to determine if any prescription or setup changes are necessary. These steps are\\ntherefore medically necessary ensuring safe and effective administration of radiation. Ongoing therapeutic radiology simulation-aided field setting verification is ordered throughout course of therapy. A high frequency ultrasound image was acquired today for two-dimensional evaluation of the tumor volume and response to treatment, in addition to geometric accuracy of field placement. US depth Is .48 mm, which is .03 mm in difference from previous imaging. The field placement and ultrasound imaging, per fraction, is separate and distinct from the initial simulation, and is an important task in providing safe administration of superficial radiation therapy. Physician evaluation of\\nthe ultrasound tumor depth will be ongoing throughout the course of treatment and is deemed medically necessary in order to ensure the efficacy of treatment and any necessary changes. Today’s image was evaluated for determination of continuation of treatment with the current plan or with necessary changes as appropriate. According to provider review of verification therapeutic radiology simulation-aided field setting and imaging, no change is required. Additionally, the use of ultrasound visualization and targeted assessment allows the patient to be able to see their cancer(s) progress, encouraging patient to complete and maintain compliance through full course of radiotherapy. Per Dr. Mitchell, continued ultrasound guidance and therapeutic radiology simulation-aided field setting verification per fraction is required for field placement, measurement of tumor depth, progress and acute effect monitoring.\\n\\n
Detail Level: Zone

## 2021-05-05 ENCOUNTER — APPOINTMENT (RX ONLY)
Dept: URBAN - METROPOLITAN AREA CLINIC 30 | Facility: CLINIC | Age: 86
Setting detail: DERMATOLOGY
End: 2021-05-05

## 2021-05-05 ENCOUNTER — APPOINTMENT (RX ONLY)
Dept: URBAN - METROPOLITAN AREA CLINIC 29 | Facility: CLINIC | Age: 86
Setting detail: DERMATOLOGY
End: 2021-05-05

## 2021-05-05 PROBLEM — C44.722 SQUAMOUS CELL CARCINOMA OF SKIN OF RIGHT LOWER LIMB, INCLUDING HIP: Status: ACTIVE | Noted: 2021-05-05

## 2021-05-05 PROCEDURE — ? PHE RELATED SUPPLIES PROVIDED/DISPENSED

## 2021-05-05 PROCEDURE — ? SUPERFICIAL RADIATION TREATMENT

## 2021-05-05 PROCEDURE — ? TREATMENT REGIMEN

## 2021-05-05 PROCEDURE — ? FOLLOW UP FOR NEXT VISIT

## 2021-05-05 PROCEDURE — 77401: CPT

## 2021-05-05 PROCEDURE — 77280 THER RAD SIMULAJ FIELD SMPL: CPT

## 2021-05-05 PROCEDURE — G6001 ECHO GUIDANCE RADIOTHERAPY: HCPCS

## 2021-05-05 NOTE — PROCEDURE: SUPERFICIAL RADIATION TREATMENT
Validate Note Data (See Information Below): Yes
Dimensions-X Axis In Cm: 1.9
Time Dose Fractionation (Optional- Include Units If Applicable): 98
Simple Simulation Preamble Text Will Be Included With Simple Simulations (.......... Indications): Simple simulation was performed today for the following reasons:
Port Dimensions-Y Axis In Cm: 3.5
Anand For Simulation Without Treatment Device Design (Simple Simulation): No
Total Number Of Fractions Rx 3: 15
Energy (Optional-Please Include Units): 100 KV
Number Of Days Off Treatment: 1
Fractions / Week: 3
Functional Status: 0 (fully active)
Fractions / Week Rx 4: 5
Shielding Size (Optional- Include Units): 3.5 cm x 3.5 cm
Daily Fractionated Dose (Optional- Include Units): 237.6 cGy
Treatment Time In Min (Optional): 0.36
Field Size (Applicator): 4.0 cm
Dimensions-Y Axis In Cm: 1.4
Fractionation Number: 24
Detail Level: Zone
Patient Positioning: Sitting
Dose Per Fractionation In Cgy (Optional): 237.6
Information: Selecting Yes will display possible errors in your note based on the variables you have selected. This validation is only offered as a suggestion for you. PLEASE NOTE THAT THE VALIDATION TEXT WILL BE REMOVED WHEN YOU FINALIZE YOUR NOTE. IF YOU WANT TO FAX A PRELIMINARY NOTE YOU WILL NEED TO TOGGLE THIS TO 'NO' IF YOU DO NOT WANT IT IN YOUR FAXED NOTE.
Custom Shielding Preamble Text Will Not Be Included With Simple Simulations (.......... X X Y Cm): A lead shield of 0.762 mm thickness is utilized to form a molded, custom shield with a
Treatment Time / Fractionation (Optional- Include Units): 0.36 min
Treatment Device Design After Initial Simulation Justification (Will Render If Bill For Treatment Devices = Yes): The patient is status post radiation simulation and is evaluated as to the use of additional devices for shielding and placement for radiation therapy.
Treatment Margins In Cm: 0.5
Initial Radiation Treatment Planning (Will Render If Bill Simulation = Yes): The patient had a complete consultation regarding all applicable modalities for the treatment of their skin cancer and based on a variety of factors including the type of tumor, size, and location, the relevant medical history as well as local tissue factors, the functional status of the individual, the ability to perform necessary postoperative wound instructions and the need for simultaneous treatments as well as overall wound healing status, it was determined that the patient would begin radiation therapy treatment for skin cancer.  A full simulation and treatment device design was performed including the determination and formulation of appropriate simple and complex devices including lead shield of 0.762 mm thickness to form molded customized shielding to specifically correlate with the lesion size including treatment margin.  The custom lead shield is adequate to accommodate the appropriate applicator and provide adequate shielding around the treatment site.  The specific field applicator, shields, and devices both simple and complex as well as the specific patient setup is outlined below.  The patient was given a full consent for superficial radiation to both verbally and in writing and the full determination of patient's eligibility for treatment and selection is outlined on the patient eligibility and treatment selection form.  The specific superficial radiotherapy prescription was determined and was documented on the superficial radiotherapy prescription form.  A treatment calculation was also performed and documented on the treatment calculation form.  Based on the prescription, the patient was scheduled for a series of fractional treatments.
Total Dose (Optional-Please Include Units): 5847
Day Of The Week Treatment Administered: Wednesday
Assessment: Appropriate reaction
Cumulative Dose In Cgy (Optional): 5702.4
Depth (Optional-Please Include Units): 2.07 mm
Custom Shielding Afterword Text Will Not Be Included With Simple Simulations (X X Y Cm............): port to correlate with the lesion size, including treatment margin. The custom lead shield is adequate to accommodate the appropriate applicator and provide adequate shielding around the treatment site. Additional shielding (as noted below) is used to protect sensitive, normal tissues.
Additional Prescription Justification Text: If there is any interruption in treatment exceeding 5 days please see Decay and Dose Adjustment Calculation and complete treatment under Prescription 2, 3 or 4 as appropriate.
Total Number Of Fractions: 25
Limb Positioning Or Elevation: elevated

## 2021-05-05 NOTE — PROCEDURE: SUPERFICIAL RADIATION TREATMENT
Detail Level: Zone
Field Number: 1
Dimensions-X Axis In Cm: 1.9
Dimensions-Y Axis In Cm: 1.4
Treatment Margins In Cm: 0.5
Render Additional Prescriptions In Note?: No
Render Patient Eligibility And Selection In Note?: Yes
Functional Status: 0 (fully active)
Port Dimensions-X Axis In Cm: 3.5
Field Size (Applicator): 4.0 cm
Patient Positioning: Sitting
Limb Positioning Or Elevation: elevated
Custom Shielding Preamble Text Will Not Be Included With Simple Simulations (.......... X X Y Cm): A lead shield of 0.762 mm thickness is utilized to form a molded, custom shield with a
Custom Shielding Afterword Text Will Not Be Included With Simple Simulations (X X Y Cm............): port to correlate with the lesion size, including treatment margin. The custom lead shield is adequate to accommodate the appropriate applicator and provide adequate shielding around the treatment site. Additional shielding (as noted below) is used to protect sensitive, normal tissues.
Initial Radiation Treatment Planning (Will Render If Bill Simulation = Yes): The patient had a complete consultation regarding all applicable modalities for the treatment of their skin cancer and based on a variety of factors including the type of tumor, size, and location, the relevant medical history as well as local tissue factors, the functional status of the individual, the ability to perform necessary postoperative wound instructions and the need for simultaneous treatments as well as overall wound healing status, it was determined that the patient would begin radiation therapy treatment for skin cancer.  A full simulation and treatment device design was performed including the determination and formulation of appropriate simple and complex devices including lead shield of 0.762 mm thickness to form molded customized shielding to specifically correlate with the lesion size including treatment margin.  The custom lead shield is adequate to accommodate the appropriate applicator and provide adequate shielding around the treatment site.  The specific field applicator, shields, and devices both simple and complex as well as the specific patient setup is outlined below.  The patient was given a full consent for superficial radiation to both verbally and in writing and the full determination of patient's eligibility for treatment and selection is outlined on the patient eligibility and treatment selection form.  The specific superficial radiotherapy prescription was determined and was documented on the superficial radiotherapy prescription form.  A treatment calculation was also performed and documented on the treatment calculation form.  Based on the prescription, the patient was scheduled for a series of fractional treatments.
Simple Simulation Preamble Text Will Be Included With Simple Simulations (.......... Indications): Simple simulation was performed today for the following reasons:
Treatment Device Design After Initial Simulation Justification (Will Render If Bill For Treatment Devices = Yes): The patient is status post radiation simulation and is evaluated as to the use of additional devices for shielding and placement for radiation therapy.
Additional Prescription Justification Text: If there is any interruption in treatment exceeding 5 days please see Decay and Dose Adjustment Calculation and complete treatment under Prescription 2, 3 or 4 as appropriate.
Shielding Size (Optional- Include Units): 3.5 cm x 3.5 cm
Energy (Optional-Please Include Units): 100 KV
Depth (Optional-Please Include Units): 2.07 mm
Fractions / Week: 3
Time Dose Fractionation (Optional- Include Units If Applicable): 98
Daily Fractionated Dose (Optional- Include Units): 237.6 cGy
Treatment Time / Fractionation (Optional- Include Units): 0.36 min
Total Number Of Fractions: 25
Total Dose (Optional-Please Include Units): 7270
Fractions / Week Rx 2: 5
Total Number Of Fractions Rx 2: 15
Dose Per Fractionation In Cgy (Optional): 237.6
Computed Treatment Time In Min (Will Render The Same As Calculated Treatment Time If Left Blank): 0.36
Day Of The Week Treatment Administered: Wednesday
Fractionation Number: 24
Cumulative Dose In Cgy (Optional): 5702.4
Assessment: Appropriate reaction
Information: Selecting Yes will display possible errors in your note based on the variables you have selected. This validation is only offered as a suggestion for you. PLEASE NOTE THAT THE VALIDATION TEXT WILL BE REMOVED WHEN YOU FINALIZE YOUR NOTE. IF YOU WANT TO FAX A PRELIMINARY NOTE YOU WILL NEED TO TOGGLE THIS TO 'NO' IF YOU DO NOT WANT IT IN YOUR FAXED NOTE.

## 2021-05-05 NOTE — PROCEDURE: TREATMENT REGIMEN
Plan: This patient has been treated today with image guided superficial radiation therapy for non-melanoma\\nskin cancer. Written informed consent has been previously obtained from this patient for this treatment. This\\nconsent is documented in the patient’s chart. The patient gave verbal consent to continue treatment\\ntoday. The patient was treated with a specific radiation dose and setup as prescribed by the provider listed on\\nthis visit note. A Radiation Therapist performed administration of radiation under supervision of\\nprovider. The treatment parameters and cumulative dose are indicated above. Prior to administering the radiation, the patient underwent a verification therapeutic radiology simulation-aided field setting defining relevant normal and abnormal target anatomy and acquiring images with high frequency ultrasound in addition to data necessary developing optimal radiation treatment process for the patient. This process includes verification of the treatment port(s) and proper treatment positioning. All treatment ports were photographed within electronic medical record. The\\npatient’s customized lead blocking along with gross tumor volume and margin was confirmed. Considering superficial radiotherapy is clinical in setup, this requires physician and radiation therapist to clarify location interest being treated against initial images, pathology and patient anatomy. Care was taken ensuring fields treated were geometrically accurate and properly positioned using therapeutic radiology simulation-aided field setting verification per fraction. This\\nprocess is also utilized to determine if any prescription or setup changes are necessary. These steps are\\ntherefore medically necessary ensuring safe and effective administration of radiation. Ongoing therapeutic radiology simulation-aided field setting verification is ordered throughout course of therapy. A high frequency ultrasound image was acquired today for two-dimensional evaluation of the tumor volume and response to treatment, in addition to geometric accuracy of field placement. US depth Is .42 mm, which is .06 mm in difference from previous imaging. The field placement and ultrasound imaging, per fraction, is separate and distinct from the initial simulation, and is an important task in providing safe administration of superficial radiation therapy. Physician evaluation of\\nthe ultrasound tumor depth will be ongoing throughout the course of treatment and is deemed medically necessary in order to ensure the efficacy of treatment and any necessary changes. Today’s image was evaluated for determination of continuation of treatment with the current plan or with necessary changes as appropriate. According to provider review of verification therapeutic radiology simulation-aided field setting and imaging, no change is required. Additionally, the use of ultrasound visualization and targeted assessment allows the patient to be able to see their cancer(s) progress, encouraging patient to complete and maintain compliance through full course of radiotherapy. Per Dr. Mitchell, continued ultrasound guidance and therapeutic radiology simulation-aided field setting verification per fraction is required for field placement, measurement of tumor depth, progress and acute effect monitoring.\\n\\n
Detail Level: Zone

## 2021-05-05 NOTE — PROCEDURE: TREATMENT REGIMEN
Plan: This patient has been treated today with image guided superficial radiation therapy for non-melanoma\\nskin cancer. Written informed consent has been previously obtained from this patient for this treatment. This\\nconsent is documented in the patient’s chart. The patient gave verbal consent to continue treatment\\ntoday. The patient was treated with a specific radiation dose and setup as prescribed by the provider listed on\\nthis visit note. A Radiation Therapist performed administration of radiation under supervision of\\nprovider. The treatment parameters and cumulative dose are indicated above. Prior to administering the radiation, the patient underwent a verification therapeutic radiology simulation-aided field setting defining relevant normal and abnormal target anatomy and acquiring images with high frequency ultrasound in addition to data necessary developing optimal radiation treatment process for the patient. This process includes verification of the treatment port(s) and proper treatment positioning. All treatment ports were photographed within electronic medical record. The\\npatient’s customized lead blocking along with gross tumor volume and margin was confirmed. Considering superficial radiotherapy is clinical in setup, this requires physician and radiation therapist to clarify location interest being treated against initial images, pathology and patient anatomy. Care was taken ensuring fields treated were geometrically accurate and properly positioned using therapeutic radiology simulation-aided field setting verification per fraction. This\\nprocess is also utilized to determine if any prescription or setup changes are necessary. These steps are\\ntherefore medically necessary ensuring safe and effective administration of radiation. Ongoing therapeutic radiology simulation-aided field setting verification is ordered throughout course of therapy. A high frequency ultrasound image was acquired today for two-dimensional evaluation of the tumor volume and response to treatment, in addition to geometric accuracy of field placement. US depth Is .42 mm, which is .06 mm in difference from previous imaging. The field placement and ultrasound imaging, per fraction, is separate and distinct from the initial simulation, and is an important task in providing safe administration of superficial radiation therapy. Physician evaluation of\\nthe ultrasound tumor depth will be ongoing throughout the course of treatment and is deemed medically necessary in order to ensure the efficacy of treatment and any necessary changes. Today’s image was evaluated for determination of continuation of treatment with the current plan or with necessary changes as appropriate. According to provider review of verification therapeutic radiology simulation-aided field setting and imaging, no change is required. Additionally, the use of ultrasound visualization and targeted assessment allows the patient to be able to see their cancer(s) progress, encouraging patient to complete and maintain compliance through full course of radiotherapy. Per Dr. Gao, continued ultrasound guidance and therapeutic radiology simulation-aided field setting verification per fraction is required for field placement, measurement of tumor depth, progress and acute effect monitoring.\\n\\n
Detail Level: Zone

## 2021-05-06 ENCOUNTER — APPOINTMENT (RX ONLY)
Dept: URBAN - METROPOLITAN AREA CLINIC 29 | Facility: CLINIC | Age: 86
Setting detail: DERMATOLOGY
End: 2021-05-06

## 2021-05-06 ENCOUNTER — APPOINTMENT (RX ONLY)
Dept: URBAN - METROPOLITAN AREA CLINIC 30 | Facility: CLINIC | Age: 86
Setting detail: DERMATOLOGY
End: 2021-05-06

## 2021-05-06 PROBLEM — C44.722 SQUAMOUS CELL CARCINOMA OF SKIN OF RIGHT LOWER LIMB, INCLUDING HIP: Status: ACTIVE | Noted: 2021-05-06

## 2021-05-06 PROCEDURE — G6001 ECHO GUIDANCE RADIOTHERAPY: HCPCS

## 2021-05-06 PROCEDURE — ? PHE RELATED SUPPLIES PROVIDED/DISPENSED

## 2021-05-06 PROCEDURE — ? SUPERFICIAL RADIATION TREATMENT

## 2021-05-06 PROCEDURE — ? FOLLOW UP FOR NEXT VISIT

## 2021-05-06 PROCEDURE — 77280 THER RAD SIMULAJ FIELD SMPL: CPT

## 2021-05-06 PROCEDURE — 77401: CPT

## 2021-05-06 PROCEDURE — 77427 RADIATION TX MANAGEMENT X5: CPT

## 2021-05-06 PROCEDURE — ? TREATMENT REGIMEN

## 2021-05-06 NOTE — PROCEDURE: TREATMENT REGIMEN
Plan: This patient has been treated today with image guided superficial radiation therapy for non-melanoma\\nskin cancer. Written informed consent has been previously obtained from this patient for this treatment. This\\nconsent is documented in the patient’s chart. The patient gave verbal consent to continue treatment\\ntoday. The patient was treated with a specific radiation dose and setup as prescribed by the provider listed on\\nthis visit note. A Radiation Therapist performed administration of radiation under supervision of\\nprovider. The treatment parameters and cumulative dose are indicated above. Prior to administering the radiation, the patient underwent a verification therapeutic radiology simulation-aided field setting defining relevant normal and abnormal target anatomy and acquiring images with high frequency ultrasound in addition to data necessary developing optimal radiation treatment process for the patient. This process includes verification of the treatment port(s) and proper treatment positioning. All treatment ports were photographed within electronic medical record. The\\npatient’s customized lead blocking along with gross tumor volume and margin was confirmed. Considering superficial radiotherapy is clinical in setup, this requires physician and radiation therapist to clarify location interest being treated against initial images, pathology and patient anatomy. Care was taken ensuring fields treated were geometrically accurate and properly positioned using therapeutic radiology simulation-aided field setting verification per fraction. This\\nprocess is also utilized to determine if any prescription or setup changes are necessary. These steps are\\ntherefore medically necessary ensuring safe and effective administration of radiation. Ongoing therapeutic radiology simulation-aided field setting verification is ordered throughout course of therapy. A high frequency ultrasound image was acquired today for two-dimensional evaluation of the tumor volume and response to treatment, in addition to geometric accuracy of field placement. US depth Is .37 mm, which is .05 mm in difference from previous imaging. The field placement and ultrasound imaging, per fraction, is separate and distinct from the initial simulation, and is an important task in providing safe administration of superficial radiation therapy. Physician evaluation of\\nthe ultrasound tumor depth will be ongoing throughout the course of treatment and is deemed medically necessary in order to ensure the efficacy of treatment and any necessary changes. Today’s image was evaluated for determination of continuation of treatment with the current plan or with necessary changes as appropriate. According to provider review of verification therapeutic radiology simulation-aided field setting and imaging, no change is required. Additionally, the use of ultrasound visualization and targeted assessment allows the patient to be able to see their cancer(s) progress, encouraging patient to complete and maintain compliance through full course of radiotherapy. Per Dr. Gao, continued ultrasound guidance and therapeutic radiology simulation-aided field setting verification per fraction is required for field placement, measurement of tumor depth, progress and acute effect monitoring.\\n\\n
Detail Level: Zone

## 2021-05-06 NOTE — PROCEDURE: SUPERFICIAL RADIATION TREATMENT
Time Dose Fractionation (Optional- Include Units If Applicable): 98
Simple Simulation Preamble Text Will Be Included With Simple Simulations (.......... Indications): Simple simulation was performed today for the following reasons:
Port Dimensions-Y Axis In Cm: 3.5
Treatment Time In Min (Optional): 0.36
Bill For Dosimetry/Render Treatment Time Calculation In Note: Yes
Dimensions-X Axis In Cm: 1.9
Anand For Simulation Without Treatment Device Design (Simple Simulation): No
Fractionation Number: 25
Energy (Optional-Please Include Units): 100 KV
Total Number Of Fractions Rx 3: 15
Field Number: 1
Fractions / Week: 3
Initial Radiation Treatment Planning (Will Render If Bill Simulation = Yes): The patient had a complete consultation regarding all applicable modalities for the treatment of their skin cancer and based on a variety of factors including the type of tumor, size, and location, the relevant medical history as well as local tissue factors, the functional status of the individual, the ability to perform necessary postoperative wound instructions and the need for simultaneous treatments as well as overall wound healing status, it was determined that the patient would begin radiation therapy treatment for skin cancer.  A full simulation and treatment device design was performed including the determination and formulation of appropriate simple and complex devices including lead shield of 0.762 mm thickness to form molded customized shielding to specifically correlate with the lesion size including treatment margin.  The custom lead shield is adequate to accommodate the appropriate applicator and provide adequate shielding around the treatment site.  The specific field applicator, shields, and devices both simple and complex as well as the specific patient setup is outlined below.  The patient was given a full consent for superficial radiation to both verbally and in writing and the full determination of patient's eligibility for treatment and selection is outlined on the patient eligibility and treatment selection form.  The specific superficial radiotherapy prescription was determined and was documented on the superficial radiotherapy prescription form.  A treatment calculation was also performed and documented on the treatment calculation form.  Based on the prescription, the patient was scheduled for a series of fractional treatments.
Functional Status: 0 (fully active)
Fractions / Week Rx 4: 5
Field Size (Applicator): 4.0 cm
Shielding Size (Optional- Include Units): 3.5 cm x 3.5 cm
Daily Fractionated Dose (Optional- Include Units): 237.6 cGy
Dose / Tx In Cgy (Optional): 237.6
Dimensions-Y Axis In Cm: 1.4
Detail Level: Zone
Comments: RTOG 2
Patient Positioning: Sitting
Assessment: Appropriate reaction
Cumulative Dose In Cgy (Optional): 3288
Custom Shielding Preamble Text Will Not Be Included With Simple Simulations (.......... X X Y Cm): A lead shield of 0.762 mm thickness is utilized to form a molded, custom shield with a
Treatment Device Design After Initial Simulation Justification (Will Render If Bill For Treatment Devices = Yes): The patient is status post radiation simulation and is evaluated as to the use of additional devices for shielding and placement for radiation therapy.
Treatment Time / Fractionation (Optional- Include Units): 0.36 min
Treatment Margins In Cm: 0.5
Information: Selecting Yes will display possible errors in your note based on the variables you have selected. This validation is only offered as a suggestion for you. PLEASE NOTE THAT THE VALIDATION TEXT WILL BE REMOVED WHEN YOU FINALIZE YOUR NOTE. IF YOU WANT TO FAX A PRELIMINARY NOTE YOU WILL NEED TO TOGGLE THIS TO 'NO' IF YOU DO NOT WANT IT IN YOUR FAXED NOTE.
Day Of The Week Treatment Administered: Thursday
Depth (Optional-Please Include Units): 2.07 mm
Custom Shielding Afterword Text Will Not Be Included With Simple Simulations (X X Y Cm............): port to correlate with the lesion size, including treatment margin. The custom lead shield is adequate to accommodate the appropriate applicator and provide adequate shielding around the treatment site. Additional shielding (as noted below) is used to protect sensitive, normal tissues.
Additional Prescription Justification Text: If there is any interruption in treatment exceeding 5 days please see Decay and Dose Adjustment Calculation and complete treatment under Prescription 2, 3 or 4 as appropriate.
Limb Positioning Or Elevation: elevated

## 2021-05-06 NOTE — PROCEDURE: SUPERFICIAL RADIATION TREATMENT
Detail Level: Zone
Field Number: 1
Dimensions-X Axis In Cm: 1.9
Dimensions-Y Axis In Cm: 1.4
Treatment Margins In Cm: 0.5
Render Additional Prescriptions In Note?: No
Render Patient Eligibility And Selection In Note?: Yes
Functional Status: 0 (fully active)
Port Dimensions-X Axis In Cm: 3.5
Field Size (Applicator): 4.0 cm
Patient Positioning: Sitting
Limb Positioning Or Elevation: elevated
Custom Shielding Preamble Text Will Not Be Included With Simple Simulations (.......... X X Y Cm): A lead shield of 0.762 mm thickness is utilized to form a molded, custom shield with a
Custom Shielding Afterword Text Will Not Be Included With Simple Simulations (X X Y Cm............): port to correlate with the lesion size, including treatment margin. The custom lead shield is adequate to accommodate the appropriate applicator and provide adequate shielding around the treatment site. Additional shielding (as noted below) is used to protect sensitive, normal tissues.
Initial Radiation Treatment Planning (Will Render If Bill Simulation = Yes): The patient had a complete consultation regarding all applicable modalities for the treatment of their skin cancer and based on a variety of factors including the type of tumor, size, and location, the relevant medical history as well as local tissue factors, the functional status of the individual, the ability to perform necessary postoperative wound instructions and the need for simultaneous treatments as well as overall wound healing status, it was determined that the patient would begin radiation therapy treatment for skin cancer.  A full simulation and treatment device design was performed including the determination and formulation of appropriate simple and complex devices including lead shield of 0.762 mm thickness to form molded customized shielding to specifically correlate with the lesion size including treatment margin.  The custom lead shield is adequate to accommodate the appropriate applicator and provide adequate shielding around the treatment site.  The specific field applicator, shields, and devices both simple and complex as well as the specific patient setup is outlined below.  The patient was given a full consent for superficial radiation to both verbally and in writing and the full determination of patient's eligibility for treatment and selection is outlined on the patient eligibility and treatment selection form.  The specific superficial radiotherapy prescription was determined and was documented on the superficial radiotherapy prescription form.  A treatment calculation was also performed and documented on the treatment calculation form.  Based on the prescription, the patient was scheduled for a series of fractional treatments.
Simple Simulation Preamble Text Will Be Included With Simple Simulations (.......... Indications): Simple simulation was performed today for the following reasons:
Treatment Device Design After Initial Simulation Justification (Will Render If Bill For Treatment Devices = Yes): The patient is status post radiation simulation and is evaluated as to the use of additional devices for shielding and placement for radiation therapy.
Additional Prescription Justification Text: If there is any interruption in treatment exceeding 5 days please see Decay and Dose Adjustment Calculation and complete treatment under Prescription 2, 3 or 4 as appropriate.
Shielding Size (Optional- Include Units): 3.5 cm x 3.5 cm
Energy (Optional-Please Include Units): 100 KV
Depth (Optional-Please Include Units): 2.07 mm
Fractions / Week: 3
Time Dose Fractionation (Optional- Include Units If Applicable): 98
Daily Fractionated Dose (Optional- Include Units): 237.6 cGy
Treatment Time / Fractionation (Optional- Include Units): 0.36 min
Total Number Of Fractions: 25
Total Dose (Optional-Please Include Units): 7579
Fractions / Week Rx 2: 5
Total Number Of Fractions Rx 2: 15
Dose Per Fractionation In Cgy (Optional): 237.6
Computed Treatment Time In Min (Will Render The Same As Calculated Treatment Time If Left Blank): 0.36
Day Of The Week Treatment Administered: Thursday
Assessment: Appropriate reaction
Comments: RTOG 2
Information: Selecting Yes will display possible errors in your note based on the variables you have selected. This validation is only offered as a suggestion for you. PLEASE NOTE THAT THE VALIDATION TEXT WILL BE REMOVED WHEN YOU FINALIZE YOUR NOTE. IF YOU WANT TO FAX A PRELIMINARY NOTE YOU WILL NEED TO TOGGLE THIS TO 'NO' IF YOU DO NOT WANT IT IN YOUR FAXED NOTE.

## 2021-05-06 NOTE — PROCEDURE: TREATMENT REGIMEN
Detail Level: Zone
Plan: This patient has been treated today with image guided superficial radiation therapy for non-melanoma\\nskin cancer. Written informed consent has been previously obtained from this patient for this treatment. This\\nconsent is documented in the patient’s chart. The patient gave verbal consent to continue treatment\\ntoday. The patient was treated with a specific radiation dose and setup as prescribed by the provider listed on\\nthis visit note. A Radiation Therapist performed administration of radiation under supervision of\\nprovider. The treatment parameters and cumulative dose are indicated above. Prior to administering the radiation, the patient underwent a verification therapeutic radiology simulation-aided field setting defining relevant normal and abnormal target anatomy and acquiring images with high frequency ultrasound in addition to data necessary developing optimal radiation treatment process for the patient. This process includes verification of the treatment port(s) and proper treatment positioning. All treatment ports were photographed within electronic medical record. The\\npatient’s customized lead blocking along with gross tumor volume and margin was confirmed. Considering superficial radiotherapy is clinical in setup, this requires physician and radiation therapist to clarify location interest being treated against initial images, pathology and patient anatomy. Care was taken ensuring fields treated were geometrically accurate and properly positioned using therapeutic radiology simulation-aided field setting verification per fraction. This\\nprocess is also utilized to determine if any prescription or setup changes are necessary. These steps are\\ntherefore medically necessary ensuring safe and effective administration of radiation. Ongoing therapeutic radiology simulation-aided field setting verification is ordered throughout course of therapy. A high frequency ultrasound image was acquired today for two-dimensional evaluation of the tumor volume and response to treatment, in addition to geometric accuracy of field placement. US depth Is .37 mm, which is .05 mm in difference from previous imaging. The field placement and ultrasound imaging, per fraction, is separate and distinct from the initial simulation, and is an important task in providing safe administration of superficial radiation therapy. Physician evaluation of\\nthe ultrasound tumor depth will be ongoing throughout the course of treatment and is deemed medically necessary in order to ensure the efficacy of treatment and any necessary changes. Today’s image was evaluated for determination of continuation of treatment with the current plan or with necessary changes as appropriate. According to provider review of verification therapeutic radiology simulation-aided field setting and imaging, no change is required. Additionally, the use of ultrasound visualization and targeted assessment allows the patient to be able to see their cancer(s) progress, encouraging patient to complete and maintain compliance through full course of radiotherapy. Per Dr. Mitchell, continued ultrasound guidance and therapeutic radiology simulation-aided field setting verification per fraction is required for field placement, measurement of tumor depth, progress and acute effect monitoring.\\n\\n

## 2021-05-08 ENCOUNTER — APPOINTMENT (RX ONLY)
Dept: URBAN - METROPOLITAN AREA CLINIC 29 | Facility: CLINIC | Age: 86
Setting detail: DERMATOLOGY
End: 2021-05-08

## 2021-05-08 ENCOUNTER — APPOINTMENT (RX ONLY)
Dept: URBAN - METROPOLITAN AREA CLINIC 30 | Facility: CLINIC | Age: 86
Setting detail: DERMATOLOGY
End: 2021-05-08

## 2021-05-08 PROCEDURE — 77336 RADIATION PHYSICS CONSULT: CPT

## 2021-06-29 ENCOUNTER — APPOINTMENT (RX ONLY)
Dept: URBAN - METROPOLITAN AREA CLINIC 30 | Facility: CLINIC | Age: 86
Setting detail: DERMATOLOGY
End: 2021-06-29

## 2021-06-29 ENCOUNTER — APPOINTMENT (RX ONLY)
Dept: URBAN - METROPOLITAN AREA CLINIC 29 | Facility: CLINIC | Age: 86
Setting detail: DERMATOLOGY
End: 2021-06-29

## 2021-06-29 PROBLEM — C44.722 SQUAMOUS CELL CARCINOMA OF SKIN OF RIGHT LOWER LIMB, INCLUDING HIP: Status: ACTIVE | Noted: 2021-06-29

## 2021-06-29 PROCEDURE — ? TREATMENT REGIMEN

## 2021-06-29 PROCEDURE — ? FOLLOW UP FOR NEXT VISIT

## 2021-06-29 PROCEDURE — G6001 ECHO GUIDANCE RADIOTHERAPY: HCPCS

## 2021-06-29 PROCEDURE — 77427 RADIATION TX MANAGEMENT X5: CPT

## 2021-06-29 PROCEDURE — ? SUPERFICIAL RADIATION TREATMENT

## 2021-06-29 PROCEDURE — ? PHE RELATED SUPPLIES PROVIDED/DISPENSED

## 2021-06-29 NOTE — PROCEDURE: TREATMENT REGIMEN
Plan: Per the request of Dr. Mitchell, patient was seen today for Superficial Radiation Therapy management.  A high frequency ultrasound image was acquired prior to evaluation today for three-dimensional evaluation of tumor volume and response to treatment (CPT® ). \\n\\nApproximately 6 weeks after finishing SRT, evaluation and management of SRT based on prescription and cumulative dose for reaction and response to radiation reveals adequate healing and response with pleasing aesthetics results.  Little to No evidence of cancerous lesion on or around treatment site when inspected visually or by ultrasound. RTOG = 1\\n\\nUS: 0.31 mm\\n
Detail Level: Zone

## 2021-06-29 NOTE — PROCEDURE: FOLLOW UP FOR NEXT VISIT
Instructions (Optional): SRT Complete
Scheduled For Follow Up In (Optional): 3 months
Detail Level: Simple

## 2021-06-29 NOTE — PROCEDURE: SUPERFICIAL RADIATION TREATMENT
Dose / Tx In Cgy (Optional): 237.6
Bill And Render Text From Evaluation And Management Tab (Will Bill [id number]): Yes
Bill For Dosimetry/Render Decay And Dose Adjustment Calculation In Note: No
Fractions / Week Rx 2: 5
Energy (Optional-Please Include Units): 100 KV
Treatment Time / Fractionation (Optional- Include Units): 0.36 min
Field Number: 1
Initial Radiation Treatment Planning (Will Render If Bill Simulation = Yes): The patient had a complete consultation regarding all applicable modalities for the treatment of their skin cancer and based on a variety of factors including the type of tumor, size, and location, the relevant medical history as well as local tissue factors, the functional status of the individual, the ability to perform necessary postoperative wound instructions and the need for simultaneous treatments as well as overall wound healing status, it was determined that the patient would begin radiation therapy treatment for skin cancer.  A full simulation and treatment device design was performed including the determination and formulation of appropriate simple and complex devices including lead shield of 0.762 mm thickness to form molded customized shielding to specifically correlate with the lesion size including treatment margin.  The custom lead shield is adequate to accommodate the appropriate applicator and provide adequate shielding around the treatment site.  The specific field applicator, shields, and devices both simple and complex as well as the specific patient setup is outlined below.  The patient was given a full consent for superficial radiation to both verbally and in writing and the full determination of patient's eligibility for treatment and selection is outlined on the patient eligibility and treatment selection form.  The specific superficial radiotherapy prescription was determined and was documented on the superficial radiotherapy prescription form.  A treatment calculation was also performed and documented on the treatment calculation form.  Based on the prescription, the patient was scheduled for a series of fractional treatments.
Field Size (Applicator): 4.0 cm
Treatment Time In Min (Optional): 0.36
Total Dose (Optional-Please Include Units): 9192
Assessment: Appropriate reaction
Dimensions-Y Axis In Cm: 1.4
Fractionation Number: 25
Detail Level: Zone
Depth (Optional-Please Include Units): 2.07 mm
Information: Selecting Yes will display possible errors in your note based on the variables you have selected. This validation is only offered as a suggestion for you. PLEASE NOTE THAT THE VALIDATION TEXT WILL BE REMOVED WHEN YOU FINALIZE YOUR NOTE. IF YOU WANT TO FAX A PRELIMINARY NOTE YOU WILL NEED TO TOGGLE THIS TO 'NO' IF YOU DO NOT WANT IT IN YOUR FAXED NOTE.
Patient Positioning: Sitting
Total Number Of Fractions Rx 4: 15
Dimensions-X Axis In Cm: 1.9
Simple Simulation Preamble Text Will Be Included With Simple Simulations (.......... Indications): Simple simulation was performed today for the following reasons:
Time Dose Fractionation (Optional- Include Units If Applicable): 98
Functional Status: 0 (fully active)
Custom Shielding Preamble Text Will Not Be Included With Simple Simulations (.......... X X Y Cm): A lead shield of 0.762 mm thickness is utilized to form a molded, custom shield with a
Port Dimensions-X Axis In Cm: 3.5
Treatment Device Design After Initial Simulation Justification (Will Render If Bill For Treatment Devices = Yes): The patient is status post radiation simulation and is evaluated as to the use of additional devices for shielding and placement for radiation therapy.
Treatment Margins In Cm: 0.5
Fractions / Week: 3
Shielding Size (Optional- Include Units): 3.5 cm x 3.5 cm
Day Of The Week Treatment Administered: Thursday
Daily Fractionated Dose (Optional- Include Units): 237.6 cGy
Custom Shielding Afterword Text Will Not Be Included With Simple Simulations (X X Y Cm............): port to correlate with the lesion size, including treatment margin. The custom lead shield is adequate to accommodate the appropriate applicator and provide adequate shielding around the treatment site. Additional shielding (as noted below) is used to protect sensitive, normal tissues.
Additional Prescription Justification Text: If there is any interruption in treatment exceeding 5 days please see Decay and Dose Adjustment Calculation and complete treatment under Prescription 2, 3 or 4 as appropriate.
Limb Positioning Or Elevation: elevated

## 2021-06-29 NOTE — PROCEDURE: SUPERFICIAL RADIATION TREATMENT
Detail Level: Zone
Field Number: 1
Dimensions-X Axis In Cm: 1.9
Dimensions-Y Axis In Cm: 1.4
Treatment Margins In Cm: 0.5
Render Additional Prescriptions In Note?: No
Render Patient Eligibility And Selection In Note?: Yes
Functional Status: 0 (fully active)
Port Dimensions-X Axis In Cm: 3.5
Field Size (Applicator): 4.0 cm
Patient Positioning: Sitting
Limb Positioning Or Elevation: elevated
Custom Shielding Preamble Text Will Not Be Included With Simple Simulations (.......... X X Y Cm): A lead shield of 0.762 mm thickness is utilized to form a molded, custom shield with a
Custom Shielding Afterword Text Will Not Be Included With Simple Simulations (X X Y Cm............): port to correlate with the lesion size, including treatment margin. The custom lead shield is adequate to accommodate the appropriate applicator and provide adequate shielding around the treatment site. Additional shielding (as noted below) is used to protect sensitive, normal tissues.
Initial Radiation Treatment Planning (Will Render If Bill Simulation = Yes): The patient had a complete consultation regarding all applicable modalities for the treatment of their skin cancer and based on a variety of factors including the type of tumor, size, and location, the relevant medical history as well as local tissue factors, the functional status of the individual, the ability to perform necessary postoperative wound instructions and the need for simultaneous treatments as well as overall wound healing status, it was determined that the patient would begin radiation therapy treatment for skin cancer.  A full simulation and treatment device design was performed including the determination and formulation of appropriate simple and complex devices including lead shield of 0.762 mm thickness to form molded customized shielding to specifically correlate with the lesion size including treatment margin.  The custom lead shield is adequate to accommodate the appropriate applicator and provide adequate shielding around the treatment site.  The specific field applicator, shields, and devices both simple and complex as well as the specific patient setup is outlined below.  The patient was given a full consent for superficial radiation to both verbally and in writing and the full determination of patient's eligibility for treatment and selection is outlined on the patient eligibility and treatment selection form.  The specific superficial radiotherapy prescription was determined and was documented on the superficial radiotherapy prescription form.  A treatment calculation was also performed and documented on the treatment calculation form.  Based on the prescription, the patient was scheduled for a series of fractional treatments.
Simple Simulation Preamble Text Will Be Included With Simple Simulations (.......... Indications): Simple simulation was performed today for the following reasons:
Treatment Device Design After Initial Simulation Justification (Will Render If Bill For Treatment Devices = Yes): The patient is status post radiation simulation and is evaluated as to the use of additional devices for shielding and placement for radiation therapy.
Additional Prescription Justification Text: If there is any interruption in treatment exceeding 5 days please see Decay and Dose Adjustment Calculation and complete treatment under Prescription 2, 3 or 4 as appropriate.
Shielding Size (Optional- Include Units): 3.5 cm x 3.5 cm
Energy (Optional-Please Include Units): 100 KV
Depth (Optional-Please Include Units): 2.07 mm
Fractions / Week: 3
Time Dose Fractionation (Optional- Include Units If Applicable): 98
Daily Fractionated Dose (Optional- Include Units): 237.6 cGy
Treatment Time / Fractionation (Optional- Include Units): 0.36 min
Total Number Of Fractions: 25
Total Dose (Optional-Please Include Units): 4867
Fractions / Week Rx 2: 5
Total Number Of Fractions Rx 2: 15
Dose Per Fractionation In Cgy (Optional): 237.6
Computed Treatment Time In Min (Will Render The Same As Calculated Treatment Time If Left Blank): 0.36
Day Of The Week Treatment Administered: Thursday
Assessment: Appropriate reaction
Information: Selecting Yes will display possible errors in your note based on the variables you have selected. This validation is only offered as a suggestion for you. PLEASE NOTE THAT THE VALIDATION TEXT WILL BE REMOVED WHEN YOU FINALIZE YOUR NOTE. IF YOU WANT TO FAX A PRELIMINARY NOTE YOU WILL NEED TO TOGGLE THIS TO 'NO' IF YOU DO NOT WANT IT IN YOUR FAXED NOTE.

## 2021-06-29 NOTE — PROCEDURE: TREATMENT REGIMEN
Plan: Per the request of Dr. Gao, patient was seen today for Superficial Radiation Therapy management.  A high frequency ultrasound image was acquired prior to evaluation today for three-dimensional evaluation of tumor volume and response to treatment (CPT® ). \\n\\nApproximately 6 weeks after finishing SRT, evaluation and management of SRT based on prescription and cumulative dose for reaction and response to radiation reveals adequate healing and response with pleasing aesthetics results.  Little to No evidence of cancerous lesion on or around treatment site when inspected visually or by ultrasound. RTOG = 1\\n\\nUS: 0.31 mm\\n
Detail Level: Zone

## 2021-09-29 ENCOUNTER — APPOINTMENT (RX ONLY)
Dept: URBAN - METROPOLITAN AREA CLINIC 30 | Facility: CLINIC | Age: 86
Setting detail: DERMATOLOGY
End: 2021-09-29

## 2021-09-29 ENCOUNTER — APPOINTMENT (RX ONLY)
Dept: URBAN - METROPOLITAN AREA CLINIC 29 | Facility: CLINIC | Age: 86
Setting detail: DERMATOLOGY
End: 2021-09-29

## 2021-09-29 DIAGNOSIS — Z85.828 PERSONAL HISTORY OF OTHER MALIGNANT NEOPLASM OF SKIN: ICD-10-CM

## 2021-09-29 DIAGNOSIS — L57.0 ACTINIC KERATOSIS: ICD-10-CM

## 2021-09-29 PROCEDURE — ? TREATMENT REGIMEN

## 2021-09-29 PROCEDURE — ? ORDER TESTS

## 2021-09-29 PROCEDURE — 17000 DESTRUCT PREMALG LESION: CPT

## 2021-09-29 PROCEDURE — ? LIQUID NITROGEN

## 2021-09-29 PROCEDURE — 99212 OFFICE O/P EST SF 10 MIN: CPT | Mod: 25

## 2021-09-29 PROCEDURE — ? COUNSELING

## 2021-09-29 PROCEDURE — ? PRESCRIPTION

## 2021-09-29 RX ORDER — MUPIROCIN 20 MG/G
THIN LAYER OINTMENT TOPICAL BID
Qty: 22 | Refills: 0 | Status: ERX | COMMUNITY
Start: 2021-09-29

## 2021-09-29 RX ADMIN — MUPIROCIN THIN LAYER: 20 OINTMENT TOPICAL at 00:00

## 2021-09-29 ASSESSMENT — LOCATION ZONE DERM
LOCATION ZONE: LEG
LOCATION ZONE: LEG

## 2021-09-29 ASSESSMENT — LOCATION DETAILED DESCRIPTION DERM
LOCATION DETAILED: RIGHT LATERAL PROXIMAL PRETIBIAL REGION
LOCATION DETAILED: RIGHT LATERAL PROXIMAL PRETIBIAL REGION
LOCATION DETAILED: RIGHT DISTAL PRETIBIAL REGION
LOCATION DETAILED: RIGHT DISTAL PRETIBIAL REGION

## 2021-09-29 ASSESSMENT — LOCATION SIMPLE DESCRIPTION DERM
LOCATION SIMPLE: RIGHT PRETIBIAL REGION
LOCATION SIMPLE: RIGHT PRETIBIAL REGION

## 2021-09-29 NOTE — PROCEDURE: LIQUID NITROGEN
Duration Of Freeze Thaw-Cycle (Seconds): 3
Number Of Freeze-Thaw Cycles: 1 freeze-thaw cycle
Show Aperture Variable?: Yes
Consent: The patient's consent was obtained including but not limited to risks of crusting, scabbing, blistering, scarring, darker or lighter pigmentary change, recurrence, incomplete removal and infection.
Render Note In Bullet Format When Appropriate: No
Detail Level: Detailed
Post-Care Instructions: I reviewed with the patient in detail post-care instructions. Patient is to wear sunprotection, and avoid picking at any of the treated lesions. Pt may apply Vaseline to crusted or scabbing areas.

## 2021-09-29 NOTE — PROCEDURE: ORDER TESTS
Performing Laboratory: [id number]
Billing Type: Third-Party Bill
Lab Facility: [id number]
Bill For Surgical Tray: no
Expected Date Of Service: 09/29/2021
